# Patient Record
Sex: FEMALE | Race: ASIAN | NOT HISPANIC OR LATINO | URBAN - METROPOLITAN AREA
[De-identification: names, ages, dates, MRNs, and addresses within clinical notes are randomized per-mention and may not be internally consistent; named-entity substitution may affect disease eponyms.]

---

## 2018-06-14 ENCOUNTER — EMERGENCY (EMERGENCY)
Facility: HOSPITAL | Age: 58
LOS: 1 days | Discharge: HOME | End: 2018-06-14
Attending: EMERGENCY MEDICINE | Admitting: EMERGENCY MEDICINE
Payer: COMMERCIAL

## 2018-06-14 VITALS
HEIGHT: 62 IN | RESPIRATION RATE: 16 BRPM | TEMPERATURE: 98 F | DIASTOLIC BLOOD PRESSURE: 99 MMHG | OXYGEN SATURATION: 100 % | SYSTOLIC BLOOD PRESSURE: 157 MMHG | HEART RATE: 93 BPM | WEIGHT: 128.09 LBS

## 2018-06-14 LAB
ALBUMIN SERPL ELPH-MCNC: 4.1 G/DL — SIGNIFICANT CHANGE UP (ref 3.3–5)
ALP SERPL-CCNC: 70 U/L — SIGNIFICANT CHANGE UP (ref 40–120)
ALT FLD-CCNC: 12 U/L — SIGNIFICANT CHANGE UP (ref 4–33)
APPEARANCE UR: CLEAR — SIGNIFICANT CHANGE UP
AST SERPL-CCNC: 12 U/L — SIGNIFICANT CHANGE UP (ref 4–32)
BASOPHILS # BLD AUTO: 0.03 K/UL — SIGNIFICANT CHANGE UP (ref 0–0.2)
BASOPHILS NFR BLD AUTO: 0.3 % — SIGNIFICANT CHANGE UP (ref 0–2)
BILIRUB SERPL-MCNC: 0.2 MG/DL — SIGNIFICANT CHANGE UP (ref 0.2–1.2)
BILIRUB UR-MCNC: NEGATIVE — SIGNIFICANT CHANGE UP
BLOOD UR QL VISUAL: NEGATIVE — SIGNIFICANT CHANGE UP
BUN SERPL-MCNC: 13 MG/DL — SIGNIFICANT CHANGE UP (ref 7–23)
CALCIUM SERPL-MCNC: 9.1 MG/DL — SIGNIFICANT CHANGE UP (ref 8.4–10.5)
CHLORIDE SERPL-SCNC: 101 MMOL/L — SIGNIFICANT CHANGE UP (ref 98–107)
CO2 SERPL-SCNC: 27 MMOL/L — SIGNIFICANT CHANGE UP (ref 22–31)
COLOR SPEC: YELLOW — SIGNIFICANT CHANGE UP
CREAT SERPL-MCNC: 0.8 MG/DL — SIGNIFICANT CHANGE UP (ref 0.5–1.3)
EOSINOPHIL # BLD AUTO: 0.31 K/UL — SIGNIFICANT CHANGE UP (ref 0–0.5)
EOSINOPHIL NFR BLD AUTO: 3.6 % — SIGNIFICANT CHANGE UP (ref 0–6)
GLUCOSE SERPL-MCNC: 131 MG/DL — HIGH (ref 70–99)
GLUCOSE UR-MCNC: NEGATIVE — SIGNIFICANT CHANGE UP
HCT VFR BLD CALC: 39.6 % — SIGNIFICANT CHANGE UP (ref 34.5–45)
HGB BLD-MCNC: 12.7 G/DL — SIGNIFICANT CHANGE UP (ref 11.5–15.5)
IMM GRANULOCYTES # BLD AUTO: 0.02 # — SIGNIFICANT CHANGE UP
IMM GRANULOCYTES NFR BLD AUTO: 0.2 % — SIGNIFICANT CHANGE UP (ref 0–1.5)
KETONES UR-MCNC: NEGATIVE — SIGNIFICANT CHANGE UP
LEUKOCYTE ESTERASE UR-ACNC: HIGH
LIDOCAIN IGE QN: 28.1 U/L — SIGNIFICANT CHANGE UP (ref 7–60)
LYMPHOCYTES # BLD AUTO: 3.12 K/UL — SIGNIFICANT CHANGE UP (ref 1–3.3)
LYMPHOCYTES # BLD AUTO: 36.3 % — SIGNIFICANT CHANGE UP (ref 13–44)
MCHC RBC-ENTMCNC: 27.8 PG — SIGNIFICANT CHANGE UP (ref 27–34)
MCHC RBC-ENTMCNC: 32.1 % — SIGNIFICANT CHANGE UP (ref 32–36)
MCV RBC AUTO: 86.7 FL — SIGNIFICANT CHANGE UP (ref 80–100)
MONOCYTES # BLD AUTO: 0.66 K/UL — SIGNIFICANT CHANGE UP (ref 0–0.9)
MONOCYTES NFR BLD AUTO: 7.7 % — SIGNIFICANT CHANGE UP (ref 2–14)
NEUTROPHILS # BLD AUTO: 4.45 K/UL — SIGNIFICANT CHANGE UP (ref 1.8–7.4)
NEUTROPHILS NFR BLD AUTO: 51.9 % — SIGNIFICANT CHANGE UP (ref 43–77)
NITRITE UR-MCNC: NEGATIVE — SIGNIFICANT CHANGE UP
NRBC # FLD: 0 — SIGNIFICANT CHANGE UP
PH UR: 7.5 — SIGNIFICANT CHANGE UP (ref 4.6–8)
PLATELET # BLD AUTO: 270 K/UL — SIGNIFICANT CHANGE UP (ref 150–400)
PMV BLD: 9.2 FL — SIGNIFICANT CHANGE UP (ref 7–13)
POTASSIUM SERPL-MCNC: 4 MMOL/L — SIGNIFICANT CHANGE UP (ref 3.5–5.3)
POTASSIUM SERPL-SCNC: 4 MMOL/L — SIGNIFICANT CHANGE UP (ref 3.5–5.3)
PROT SERPL-MCNC: 7.4 G/DL — SIGNIFICANT CHANGE UP (ref 6–8.3)
PROT UR-MCNC: NEGATIVE MG/DL — SIGNIFICANT CHANGE UP
RBC # BLD: 4.57 M/UL — SIGNIFICANT CHANGE UP (ref 3.8–5.2)
RBC # FLD: 13.3 % — SIGNIFICANT CHANGE UP (ref 10.3–14.5)
RBC CASTS # UR COMP ASSIST: SIGNIFICANT CHANGE UP (ref 0–?)
SODIUM SERPL-SCNC: 140 MMOL/L — SIGNIFICANT CHANGE UP (ref 135–145)
SP GR SPEC: 1.01 — SIGNIFICANT CHANGE UP (ref 1–1.04)
UROBILINOGEN FLD QL: NORMAL MG/DL — SIGNIFICANT CHANGE UP
WBC # BLD: 8.59 K/UL — SIGNIFICANT CHANGE UP (ref 3.8–10.5)
WBC # FLD AUTO: 8.59 K/UL — SIGNIFICANT CHANGE UP (ref 3.8–10.5)
WBC UR QL: HIGH (ref 0–?)

## 2018-06-14 PROCEDURE — 99283 EMERGENCY DEPT VISIT LOW MDM: CPT

## 2018-06-14 RX ORDER — CIPROFLOXACIN LACTATE 400MG/40ML
500 VIAL (ML) INTRAVENOUS ONCE
Qty: 0 | Refills: 0 | Status: COMPLETED | OUTPATIENT
Start: 2018-06-14 | End: 2018-06-14

## 2018-06-14 RX ORDER — MOXIFLOXACIN HYDROCHLORIDE TABLETS, 400 MG 400 MG/1
1 TABLET, FILM COATED ORAL
Qty: 10 | Refills: 0
Start: 2018-06-14 | End: 2018-06-18

## 2018-06-14 RX ADMIN — Medication 500 MILLIGRAM(S): at 23:44

## 2018-06-14 NOTE — ED PROVIDER NOTE - OBJECTIVE STATEMENT
57y F with hx of HLD and DM here for suprapubic and abd pain beginning today preceding with nausea and nonbloody nonbilious vomiting that occur last night. Denies fever, chills, SOB, CP, diarrhea, constipation, or dysuria s/x

## 2018-06-14 NOTE — ED ADULT TRIAGE NOTE - CHIEF COMPLAINT QUOTE
Pt c/o of diffuse abdomina pain with nausea and vomiting since yesterday pt was seen in  urgent care and sent in for further evaluation.

## 2018-06-16 LAB
BACTERIA UR CULT: SIGNIFICANT CHANGE UP
SPECIMEN SOURCE: SIGNIFICANT CHANGE UP

## 2018-06-17 NOTE — ED POST DISCHARGE NOTE - RESULT SUMMARY
culture grew 3 or more types of organims which indicate collection contamination, consider recollection only if clinically indicated. Patient contact # [phone] discussed with patient needs to follow up with MD for repeat UA/UCX. Discussed with patient need to return to ED if symptoms don't continue to improve or recur or develops any new or worsening symptoms that are of concern.

## 2018-08-01 ENCOUNTER — RESULT REVIEW (OUTPATIENT)
Age: 58
End: 2018-08-01

## 2020-11-20 ENCOUNTER — TRANSCRIPTION ENCOUNTER (OUTPATIENT)
Age: 60
End: 2020-11-20

## 2020-11-20 ENCOUNTER — INPATIENT (INPATIENT)
Facility: HOSPITAL | Age: 60
LOS: 2 days | Discharge: HOME | End: 2020-11-23
Attending: INTERNAL MEDICINE | Admitting: INTERNAL MEDICINE
Payer: COMMERCIAL

## 2020-11-20 VITALS
SYSTOLIC BLOOD PRESSURE: 137 MMHG | RESPIRATION RATE: 18 BRPM | TEMPERATURE: 98 F | HEIGHT: 62 IN | OXYGEN SATURATION: 100 % | DIASTOLIC BLOOD PRESSURE: 97 MMHG | HEART RATE: 112 BPM

## 2020-11-20 LAB
ALBUMIN SERPL ELPH-MCNC: 4.4 G/DL — SIGNIFICANT CHANGE UP (ref 3.3–5)
ALP SERPL-CCNC: 79 U/L — SIGNIFICANT CHANGE UP (ref 40–120)
ALT FLD-CCNC: 18 U/L — SIGNIFICANT CHANGE UP (ref 4–33)
ANION GAP SERPL CALC-SCNC: 14 MMO/L — SIGNIFICANT CHANGE UP (ref 7–14)
AST SERPL-CCNC: 13 U/L — SIGNIFICANT CHANGE UP (ref 4–32)
BASOPHILS # BLD AUTO: 0.01 K/UL — SIGNIFICANT CHANGE UP (ref 0–0.2)
BASOPHILS NFR BLD AUTO: 0.1 % — SIGNIFICANT CHANGE UP (ref 0–2)
BILIRUB SERPL-MCNC: 0.4 MG/DL — SIGNIFICANT CHANGE UP (ref 0.2–1.2)
BUN SERPL-MCNC: 19 MG/DL — SIGNIFICANT CHANGE UP (ref 7–23)
CALCIUM SERPL-MCNC: 10.3 MG/DL — SIGNIFICANT CHANGE UP (ref 8.4–10.5)
CHLORIDE SERPL-SCNC: 92 MMOL/L — LOW (ref 98–107)
CO2 SERPL-SCNC: 31 MMOL/L — SIGNIFICANT CHANGE UP (ref 22–31)
CREAT SERPL-MCNC: 0.81 MG/DL — SIGNIFICANT CHANGE UP (ref 0.5–1.3)
EOSINOPHIL # BLD AUTO: 0.04 K/UL — SIGNIFICANT CHANGE UP (ref 0–0.5)
EOSINOPHIL NFR BLD AUTO: 0.4 % — SIGNIFICANT CHANGE UP (ref 0–6)
GLUCOSE SERPL-MCNC: 233 MG/DL — HIGH (ref 70–99)
HCT VFR BLD CALC: 44.3 % — SIGNIFICANT CHANGE UP (ref 34.5–45)
HGB BLD-MCNC: 13.9 G/DL — SIGNIFICANT CHANGE UP (ref 11.5–15.5)
IMM GRANULOCYTES NFR BLD AUTO: 0.4 % — SIGNIFICANT CHANGE UP (ref 0–1.5)
LIDOCAIN IGE QN: 87.3 U/L — HIGH (ref 7–60)
LYMPHOCYTES # BLD AUTO: 2.59 K/UL — SIGNIFICANT CHANGE UP (ref 1–3.3)
LYMPHOCYTES # BLD AUTO: 24.2 % — SIGNIFICANT CHANGE UP (ref 13–44)
MCHC RBC-ENTMCNC: 26.4 PG — LOW (ref 27–34)
MCHC RBC-ENTMCNC: 31.4 % — LOW (ref 32–36)
MCV RBC AUTO: 84.1 FL — SIGNIFICANT CHANGE UP (ref 80–100)
MONOCYTES # BLD AUTO: 1.06 K/UL — HIGH (ref 0–0.9)
MONOCYTES NFR BLD AUTO: 9.9 % — SIGNIFICANT CHANGE UP (ref 2–14)
NEUTROPHILS # BLD AUTO: 6.97 K/UL — SIGNIFICANT CHANGE UP (ref 1.8–7.4)
NEUTROPHILS NFR BLD AUTO: 65 % — SIGNIFICANT CHANGE UP (ref 43–77)
NRBC # FLD: 0 K/UL — SIGNIFICANT CHANGE UP (ref 0–0)
PLATELET # BLD AUTO: 321 K/UL — SIGNIFICANT CHANGE UP (ref 150–400)
PMV BLD: 9 FL — SIGNIFICANT CHANGE UP (ref 7–13)
POTASSIUM SERPL-MCNC: 3.8 MMOL/L — SIGNIFICANT CHANGE UP (ref 3.5–5.3)
POTASSIUM SERPL-SCNC: 3.8 MMOL/L — SIGNIFICANT CHANGE UP (ref 3.5–5.3)
PROT SERPL-MCNC: 8 G/DL — SIGNIFICANT CHANGE UP (ref 6–8.3)
RBC # BLD: 5.27 M/UL — HIGH (ref 3.8–5.2)
RBC # FLD: 13.8 % — SIGNIFICANT CHANGE UP (ref 10.3–14.5)
SODIUM SERPL-SCNC: 137 MMOL/L — SIGNIFICANT CHANGE UP (ref 135–145)
WBC # BLD: 10.71 K/UL — HIGH (ref 3.8–10.5)
WBC # FLD AUTO: 10.71 K/UL — HIGH (ref 3.8–10.5)

## 2020-11-20 PROCEDURE — 99284 EMERGENCY DEPT VISIT MOD MDM: CPT

## 2020-11-20 RX ORDER — MORPHINE SULFATE 50 MG/1
4 CAPSULE, EXTENDED RELEASE ORAL ONCE
Refills: 0 | Status: DISCONTINUED | OUTPATIENT
Start: 2020-11-20 | End: 2020-11-20

## 2020-11-20 RX ORDER — ONDANSETRON 8 MG/1
4 TABLET, FILM COATED ORAL ONCE
Refills: 0 | Status: COMPLETED | OUTPATIENT
Start: 2020-11-20 | End: 2020-11-20

## 2020-11-20 RX ADMIN — MORPHINE SULFATE 4 MILLIGRAM(S): 50 CAPSULE, EXTENDED RELEASE ORAL at 22:30

## 2020-11-20 RX ADMIN — ONDANSETRON 4 MILLIGRAM(S): 8 TABLET, FILM COATED ORAL at 22:31

## 2020-11-20 RX ADMIN — MORPHINE SULFATE 4 MILLIGRAM(S): 50 CAPSULE, EXTENDED RELEASE ORAL at 22:49

## 2020-11-20 NOTE — ED ADULT NURSE NOTE - OBJECTIVE STATEMENT
A&Ox4 and ambulatory. Pt. states that she began experiencing right sided abdominal pain radiating to right flank last night. Pt. states that she has hx of bladder infection and it feels similar. Right sided abdomen is soft and tender. Pt. states that she was given omeprazole by PCP but it was ineffective. Hypertensive with sbp 180s. Report given to primary RN.

## 2020-11-20 NOTE — ED ADULT TRIAGE NOTE - CHIEF COMPLAINT QUOTE
abd pain    c/o lower pain since last night, vomited yesterday and today, last normal bm today.  states has been drinking but urine output is less.  hx of diabetes, htn, hi chol.

## 2020-11-20 NOTE — ED PROVIDER NOTE - CLINICAL SUMMARY MEDICAL DECISION MAKING FREE TEXT BOX
59 y/o F presenting with abd pain x1 day with associated n/v, RLQ tenderness r/o appy vs other GI pathology. Basic labs, CT, symptom treatment. Dispo pending results - Stevie Coto DO PGY-2

## 2020-11-20 NOTE — ED PROVIDER NOTE - ATTENDING CONTRIBUTION TO CARE
DR. BLOCH, ATTENDING MD-  I performed a face to face bedside interview with patient regarding history of present illness, review of symptoms and past medical history. I completed an independent physical exam.  I have discussed patient's plan of care with the resident.  Patient examined,  mod distress HEENT no jaundice, heart s1s2, mildly tachycardic, lungs clear, abd soft pos RLQ tenderness, pos guarding, no hernia palpated. extrem no edema.

## 2020-11-20 NOTE — ED PROVIDER NOTE - OBJECTIVE STATEMENT
61 y/o F with PMH of HLD, DM presenting with generalized abdominal pain that began last night. States pain has been progressively worsening. Has associated n/v. Decreased PO intake. Denies fevers, chills, cp, sob, diarrhea, LE swelling, dysuria, hematuria.

## 2020-11-20 NOTE — ED ADULT NURSE REASSESSMENT NOTE - NS ED NURSE REASSESS COMMENT FT1
report received from facilitator RN. Patient is A&Ox4. On cardiac monitor. Complaining of abdominal pain rated 10 out of 10. No acute respiratory distress. 20 gauge IV in left antecubital flushes without difficulty, no redness or swelling observed around IV site. Pain medication administered as per order. Dimmed lights for comfort. [place] provided for comfort. Stretcher in lowest position, wheels locked, side rails up as per protocol. Call bell in reach. Will continue to monitor.

## 2020-11-20 NOTE — ED PROVIDER NOTE - PHYSICAL EXAMINATION
gen: well appearing  Mentation: AAO x 3  psych: mood appropriate  ENT: airway patent  Eyes: conjunctivae clear bilaterally  Cardio: RRR, no m/r/g  Resp: normal BS b/l  GI: +TTP in RLQ  : no CVA tenderness  Neuro: sensation and motor function intact  Skin: No evidence of rash  MSK: normal movement of all extremities  Lymph/Vasc: no LE edema

## 2020-11-20 NOTE — ED PROVIDER NOTE - NS ED ROS FT
CONSTITUTIONAL: No fevers, no chills, no lightheadedness, no dizziness  Eyes: no visual changes  Ears: no ear drainage, no ear pain  Nose: no nasal congestion  Mouth/Throat: no sore throat  CV: No chest pain, no palpitations  PULM: No SOB, no cough  GI: +n/v, abd pain; no diarrhea  : no dysuria, no hematuria  SKIN: no rashes.  NEURO: no headache, no focal weakness or numbness  LYMPH/VASC: no LE swelling

## 2020-11-21 DIAGNOSIS — K56.609 UNSPECIFIED INTESTINAL OBSTRUCTION, UNSPECIFIED AS TO PARTIAL VERSUS COMPLETE OBSTRUCTION: ICD-10-CM

## 2020-11-21 PROBLEM — E78.5 HYPERLIPIDEMIA, UNSPECIFIED: Chronic | Status: ACTIVE | Noted: 2018-06-14

## 2020-11-21 PROBLEM — E11.9 TYPE 2 DIABETES MELLITUS WITHOUT COMPLICATIONS: Chronic | Status: ACTIVE | Noted: 2018-06-14

## 2020-11-21 LAB
ANION GAP SERPL CALC-SCNC: 15 MMO/L — HIGH (ref 7–14)
APPEARANCE UR: CLEAR — SIGNIFICANT CHANGE UP
APTT BLD: 27.4 SEC — SIGNIFICANT CHANGE UP (ref 27–36.3)
BACTERIA # UR AUTO: NEGATIVE — SIGNIFICANT CHANGE UP
BASE EXCESS BLDV CALC-SCNC: 6.7 MMOL/L — SIGNIFICANT CHANGE UP
BILIRUB UR-MCNC: NEGATIVE — SIGNIFICANT CHANGE UP
BLD GP AB SCN SERPL QL: NEGATIVE — SIGNIFICANT CHANGE UP
BLOOD GAS VENOUS - CREATININE: 0.85 MG/DL — SIGNIFICANT CHANGE UP (ref 0.5–1.3)
BLOOD UR QL VISUAL: NEGATIVE — SIGNIFICANT CHANGE UP
BUN SERPL-MCNC: 16 MG/DL — SIGNIFICANT CHANGE UP (ref 7–23)
CALCIUM SERPL-MCNC: 9.9 MG/DL — SIGNIFICANT CHANGE UP (ref 8.4–10.5)
CHLORIDE BLDV-SCNC: 98 MMOL/L — SIGNIFICANT CHANGE UP (ref 96–108)
CHLORIDE SERPL-SCNC: 96 MMOL/L — LOW (ref 98–107)
CO2 SERPL-SCNC: 27 MMOL/L — SIGNIFICANT CHANGE UP (ref 22–31)
COLOR SPEC: YELLOW — SIGNIFICANT CHANGE UP
CREAT SERPL-MCNC: 0.76 MG/DL — SIGNIFICANT CHANGE UP (ref 0.5–1.3)
GAS PNL BLDV: 138 MMOL/L — SIGNIFICANT CHANGE UP (ref 136–146)
GLUCOSE BLDC GLUCOMTR-MCNC: 152 MG/DL — HIGH (ref 70–99)
GLUCOSE BLDC GLUCOMTR-MCNC: 207 MG/DL — HIGH (ref 70–99)
GLUCOSE BLDC GLUCOMTR-MCNC: 216 MG/DL — HIGH (ref 70–99)
GLUCOSE BLDV-MCNC: 263 MG/DL — HIGH (ref 70–99)
GLUCOSE SERPL-MCNC: 280 MG/DL — HIGH (ref 70–99)
GLUCOSE UR-MCNC: NEGATIVE — SIGNIFICANT CHANGE UP
HCO3 BLDV-SCNC: 30 MMOL/L — HIGH (ref 20–27)
HCT VFR BLD CALC: 40.8 % — SIGNIFICANT CHANGE UP (ref 34.5–45)
HCT VFR BLDV CALC: 41.1 % — SIGNIFICANT CHANGE UP (ref 34.5–45)
HGB BLD-MCNC: 12.9 G/DL — SIGNIFICANT CHANGE UP (ref 11.5–15.5)
HGB BLDV-MCNC: 13.4 G/DL — SIGNIFICANT CHANGE UP (ref 11.5–15.5)
HYALINE CASTS # UR AUTO: NEGATIVE — SIGNIFICANT CHANGE UP
INR BLD: 1.07 — SIGNIFICANT CHANGE UP (ref 0.88–1.16)
KETONES UR-MCNC: SIGNIFICANT CHANGE UP
LACTATE BLDV-MCNC: 1.6 MMOL/L — SIGNIFICANT CHANGE UP (ref 0.5–2)
LEUKOCYTE ESTERASE UR-ACNC: SIGNIFICANT CHANGE UP
MAGNESIUM SERPL-MCNC: 1.8 MG/DL — SIGNIFICANT CHANGE UP (ref 1.6–2.6)
MCHC RBC-ENTMCNC: 26.6 PG — LOW (ref 27–34)
MCHC RBC-ENTMCNC: 31.6 % — LOW (ref 32–36)
MCV RBC AUTO: 84.1 FL — SIGNIFICANT CHANGE UP (ref 80–100)
NITRITE UR-MCNC: NEGATIVE — SIGNIFICANT CHANGE UP
NRBC # FLD: 0 K/UL — SIGNIFICANT CHANGE UP (ref 0–0)
PCO2 BLDV: 51 MMHG — SIGNIFICANT CHANGE UP (ref 41–51)
PH BLDV: 7.41 PH — SIGNIFICANT CHANGE UP (ref 7.32–7.43)
PH UR: 7 — SIGNIFICANT CHANGE UP (ref 5–8)
PHOSPHATE SERPL-MCNC: 4.4 MG/DL — SIGNIFICANT CHANGE UP (ref 2.5–4.5)
PLATELET # BLD AUTO: 299 K/UL — SIGNIFICANT CHANGE UP (ref 150–400)
PMV BLD: 8.9 FL — SIGNIFICANT CHANGE UP (ref 7–13)
PO2 BLDV: 58 MMHG — HIGH (ref 35–40)
POTASSIUM BLDV-SCNC: 4.1 MMOL/L — SIGNIFICANT CHANGE UP (ref 3.4–4.5)
POTASSIUM SERPL-MCNC: 4.2 MMOL/L — SIGNIFICANT CHANGE UP (ref 3.5–5.3)
POTASSIUM SERPL-SCNC: 4.2 MMOL/L — SIGNIFICANT CHANGE UP (ref 3.5–5.3)
PROT UR-MCNC: 10 — SIGNIFICANT CHANGE UP
PROTHROM AB SERPL-ACNC: 12.2 SEC — SIGNIFICANT CHANGE UP (ref 10.6–13.6)
RBC # BLD: 4.85 M/UL — SIGNIFICANT CHANGE UP (ref 3.8–5.2)
RBC # FLD: 13.8 % — SIGNIFICANT CHANGE UP (ref 10.3–14.5)
RBC CASTS # UR COMP ASSIST: SIGNIFICANT CHANGE UP (ref 0–?)
RH IG SCN BLD-IMP: POSITIVE — SIGNIFICANT CHANGE UP
RH IG SCN BLD-IMP: POSITIVE — SIGNIFICANT CHANGE UP
SAO2 % BLDV: 87.4 % — HIGH (ref 60–85)
SARS-COV-2 RNA SPEC QL NAA+PROBE: SIGNIFICANT CHANGE UP
SODIUM SERPL-SCNC: 138 MMOL/L — SIGNIFICANT CHANGE UP (ref 135–145)
SP GR SPEC: 1.02 — SIGNIFICANT CHANGE UP (ref 1–1.04)
SQUAMOUS # UR AUTO: SIGNIFICANT CHANGE UP
UROBILINOGEN FLD QL: NORMAL — SIGNIFICANT CHANGE UP
WBC # BLD: 10.03 K/UL — SIGNIFICANT CHANGE UP (ref 3.8–10.5)
WBC # FLD AUTO: 10.03 K/UL — SIGNIFICANT CHANGE UP (ref 3.8–10.5)
WBC UR QL: SIGNIFICANT CHANGE UP (ref 0–?)

## 2020-11-21 PROCEDURE — 44180 LAP ENTEROLYSIS: CPT

## 2020-11-21 PROCEDURE — 71045 X-RAY EXAM CHEST 1 VIEW: CPT | Mod: 26

## 2020-11-21 PROCEDURE — 74177 CT ABD & PELVIS W/CONTRAST: CPT | Mod: 26

## 2020-11-21 PROCEDURE — 99222 1ST HOSP IP/OBS MODERATE 55: CPT | Mod: GC

## 2020-11-21 RX ORDER — DEXTROSE 50 % IN WATER 50 %
25 SYRINGE (ML) INTRAVENOUS ONCE
Refills: 0 | Status: DISCONTINUED | OUTPATIENT
Start: 2020-11-21 | End: 2020-11-22

## 2020-11-21 RX ORDER — MORPHINE SULFATE 50 MG/1
4 CAPSULE, EXTENDED RELEASE ORAL ONCE
Refills: 0 | Status: DISCONTINUED | OUTPATIENT
Start: 2020-11-21 | End: 2020-11-21

## 2020-11-21 RX ORDER — HYDROMORPHONE HYDROCHLORIDE 2 MG/ML
0.25 INJECTION INTRAMUSCULAR; INTRAVENOUS; SUBCUTANEOUS
Refills: 0 | Status: DISCONTINUED | OUTPATIENT
Start: 2020-11-21 | End: 2020-11-21

## 2020-11-21 RX ORDER — SODIUM CHLORIDE 9 MG/ML
1000 INJECTION, SOLUTION INTRAVENOUS
Refills: 0 | Status: DISCONTINUED | OUTPATIENT
Start: 2020-11-21 | End: 2020-11-22

## 2020-11-21 RX ORDER — ONDANSETRON 8 MG/1
4 TABLET, FILM COATED ORAL ONCE
Refills: 0 | Status: DISCONTINUED | OUTPATIENT
Start: 2020-11-21 | End: 2020-11-23

## 2020-11-21 RX ORDER — ENOXAPARIN SODIUM 100 MG/ML
40 INJECTION SUBCUTANEOUS DAILY
Refills: 0 | Status: DISCONTINUED | OUTPATIENT
Start: 2020-11-21 | End: 2020-11-23

## 2020-11-21 RX ORDER — ACETAMINOPHEN 500 MG
1000 TABLET ORAL ONCE
Refills: 0 | Status: DISCONTINUED | OUTPATIENT
Start: 2020-11-21 | End: 2020-11-21

## 2020-11-21 RX ORDER — SODIUM CHLORIDE 9 MG/ML
500 INJECTION, SOLUTION INTRAVENOUS ONCE
Refills: 0 | Status: COMPLETED | OUTPATIENT
Start: 2020-11-21 | End: 2020-11-21

## 2020-11-21 RX ORDER — TETRACAINE/BENZOCAINE/BUTAMBEN 2%-14%-2%
1 OINTMENT (GRAM) TOPICAL ONCE
Refills: 0 | Status: COMPLETED | OUTPATIENT
Start: 2020-11-21 | End: 2020-11-21

## 2020-11-21 RX ORDER — GLUCAGON INJECTION, SOLUTION 0.5 MG/.1ML
1 INJECTION, SOLUTION SUBCUTANEOUS ONCE
Refills: 0 | Status: DISCONTINUED | OUTPATIENT
Start: 2020-11-21 | End: 2020-11-22

## 2020-11-21 RX ORDER — HYDROMORPHONE HYDROCHLORIDE 2 MG/ML
0.5 INJECTION INTRAMUSCULAR; INTRAVENOUS; SUBCUTANEOUS
Refills: 0 | Status: DISCONTINUED | OUTPATIENT
Start: 2020-11-21 | End: 2020-11-21

## 2020-11-21 RX ORDER — ONDANSETRON 8 MG/1
4 TABLET, FILM COATED ORAL ONCE
Refills: 0 | Status: COMPLETED | OUTPATIENT
Start: 2020-11-21 | End: 2020-11-21

## 2020-11-21 RX ORDER — ONDANSETRON 8 MG/1
4 TABLET, FILM COATED ORAL ONCE
Refills: 0 | Status: DISCONTINUED | OUTPATIENT
Start: 2020-11-21 | End: 2020-11-21

## 2020-11-21 RX ORDER — INFLUENZA VIRUS VACCINE 15; 15; 15; 15 UG/.5ML; UG/.5ML; UG/.5ML; UG/.5ML
0.5 SUSPENSION INTRAMUSCULAR ONCE
Refills: 0 | Status: DISCONTINUED | OUTPATIENT
Start: 2020-11-21 | End: 2020-11-23

## 2020-11-21 RX ORDER — HYDROMORPHONE HYDROCHLORIDE 2 MG/ML
0.5 INJECTION INTRAMUSCULAR; INTRAVENOUS; SUBCUTANEOUS EVERY 4 HOURS
Refills: 0 | Status: DISCONTINUED | OUTPATIENT
Start: 2020-11-21 | End: 2020-11-22

## 2020-11-21 RX ORDER — DIATRIZOATE MEGLUMINE 180 MG/ML
100 INJECTION, SOLUTION INTRAVESICAL ONCE
Refills: 0 | Status: DISCONTINUED | OUTPATIENT
Start: 2020-11-21 | End: 2020-11-22

## 2020-11-21 RX ORDER — INSULIN LISPRO 100/ML
VIAL (ML) SUBCUTANEOUS EVERY 6 HOURS
Refills: 0 | Status: DISCONTINUED | OUTPATIENT
Start: 2020-11-21 | End: 2020-11-22

## 2020-11-21 RX ORDER — OXYCODONE HYDROCHLORIDE 5 MG/1
5 TABLET ORAL ONCE
Refills: 0 | Status: DISCONTINUED | OUTPATIENT
Start: 2020-11-21 | End: 2020-11-21

## 2020-11-21 RX ORDER — ACETAMINOPHEN 500 MG
1000 TABLET ORAL ONCE
Refills: 0 | Status: COMPLETED | OUTPATIENT
Start: 2020-11-21 | End: 2020-11-21

## 2020-11-21 RX ORDER — DEXTROSE 50 % IN WATER 50 %
15 SYRINGE (ML) INTRAVENOUS ONCE
Refills: 0 | Status: DISCONTINUED | OUTPATIENT
Start: 2020-11-21 | End: 2020-11-22

## 2020-11-21 RX ORDER — DEXTROSE 50 % IN WATER 50 %
12.5 SYRINGE (ML) INTRAVENOUS ONCE
Refills: 0 | Status: DISCONTINUED | OUTPATIENT
Start: 2020-11-21 | End: 2020-11-22

## 2020-11-21 RX ADMIN — SODIUM CHLORIDE 1000 MILLILITER(S): 9 INJECTION, SOLUTION INTRAVENOUS at 10:55

## 2020-11-21 RX ADMIN — Medication 1000 MILLIGRAM(S): at 21:15

## 2020-11-21 RX ADMIN — Medication 2: at 17:23

## 2020-11-21 RX ADMIN — SODIUM CHLORIDE 100 MILLILITER(S): 9 INJECTION, SOLUTION INTRAVENOUS at 20:49

## 2020-11-21 RX ADMIN — Medication 400 MILLIGRAM(S): at 20:45

## 2020-11-21 RX ADMIN — MORPHINE SULFATE 4 MILLIGRAM(S): 50 CAPSULE, EXTENDED RELEASE ORAL at 04:44

## 2020-11-21 RX ADMIN — ONDANSETRON 4 MILLIGRAM(S): 8 TABLET, FILM COATED ORAL at 08:48

## 2020-11-21 RX ADMIN — Medication 4: at 12:08

## 2020-11-21 RX ADMIN — MORPHINE SULFATE 4 MILLIGRAM(S): 50 CAPSULE, EXTENDED RELEASE ORAL at 04:08

## 2020-11-21 RX ADMIN — Medication 4: at 23:49

## 2020-11-21 RX ADMIN — SODIUM CHLORIDE 100 MILLILITER(S): 9 INJECTION, SOLUTION INTRAVENOUS at 06:37

## 2020-11-21 NOTE — PROVIDER CONTACT NOTE (OTHER) - SITUATION
Patient is still hypertensive and tachycardic; 181/109; with HR of 122 denies of any pain. denies chest pain, denies of palpitations.

## 2020-11-21 NOTE — CHART NOTE - NSCHARTNOTEFT_GEN_A_CORE
POST-OPERATIVE NOTE    Patient is s/p ex lap with lysus of adhesions    Subjective:  Patient denies pain at the incisional site, controlled with medication  Denies chest pain, shortness of breath, nausea, vomiting  Is not yet passing gas or having bowel movements  Urinating independently and ambulating independently    Vital Signs Last 24 Hrs  T(C): 36.9 (21 Nov 2020 18:04), Max: 36.9 (21 Nov 2020 13:25)  T(F): 98.4 (21 Nov 2020 18:04), Max: 98.5 (21 Nov 2020 13:25)  HR: 100 (21 Nov 2020 18:04) (94 - 135)  BP: 157/99 (21 Nov 2020 18:04) (136/92 - 190/100)  BP(mean): 100 (21 Nov 2020 17:30) (97 - 111)  RR: 18 (21 Nov 2020 18:04) (12 - 25)  SpO2: 95% (21 Nov 2020 18:04) (90% - 100%)  I&O's Detail    21 Nov 2020 07:01  -  21 Nov 2020 19:57  --------------------------------------------------------  IN:    Lactated Ringers: 150 mL  Total IN: 150 mL    OUT:    Nasogastric/Oral tube (mL): 125 mL    Voided (mL): 300 mL  Total OUT: 425 mL    Total NET: -275 mL        enoxaparin Injectable 40    PAST MEDICAL & SURGICAL HISTORY:  DM (diabetes mellitus)    HLD (hyperlipidemia)    No significant past surgical history          Physical Exam:  General: NAD, resting comfortably in bed  Pulmonary: Nonlabored breathing, no respiratory distress, CTAB  Cardiovascular: NSR, no murmurs or rubs  Abdominal: soft, appropriately tender, nondistended. Port incisions CDI and covered in dermabond.  Extremities: WWP      LABS:                        12.9   10.03 )-----------( 299      ( 21 Nov 2020 09:20 )             40.8     11-21    138  |  96<L>  |  16  ----------------------------<  280<H>  4.2   |  27  |  0.76    Ca    9.9      21 Nov 2020 09:20  Phos  4.4     11-21  Mg     1.8     11-21    TPro  8.0  /  Alb  4.4  /  TBili  0.4  /  DBili  x   /  AST  13  /  ALT  18  /  AlkPhos  79  11-20    PT/INR - ( 21 Nov 2020 04:16 )   PT: 12.2 SEC;   INR: 1.07          PTT - ( 21 Nov 2020 04:16 )  PTT:27.4 SEC  CAPILLARY BLOOD GLUCOSE      POCT Blood Glucose.: 152 mg/dL (21 Nov 2020 17:17)  POCT Blood Glucose.: 216 mg/dL (21 Nov 2020 12:05)  POCT Blood Glucose.: 253 mg/dL (21 Nov 2020 04:42)  POCT Blood Glucose.: 219 mg/dL (20 Nov 2020 20:54)      Radiology and Additional Studies:    Assessment:  The patient is a 60y Female who is now 4 hours post-op from an ex lap with MOISÉS.    Plan:  - Pain control as needed, continue Tylenol, breakthrough dilaudid  - NPO/NGT to LCWS  - ADAT  - DVT ppx  - OOB and ambulating as tolerated  - F/u AM labs    B Team Surgery  [id number] POST-OPERATIVE NOTE    Patient is s/p ex lap with lysis of adhesions    Subjective:  Patient denies pain at the incisional site, controlled with medication  Denies chest pain, shortness of breath, nausea, vomiting  Is not yet passing gas or having bowel movements  Urinating independently and ambulating independently    Vital Signs Last 24 Hrs  T(C): 36.9 (21 Nov 2020 18:04), Max: 36.9 (21 Nov 2020 13:25)  T(F): 98.4 (21 Nov 2020 18:04), Max: 98.5 (21 Nov 2020 13:25)  HR: 100 (21 Nov 2020 18:04) (94 - 135)  BP: 157/99 (21 Nov 2020 18:04) (136/92 - 190/100)  BP(mean): 100 (21 Nov 2020 17:30) (97 - 111)  RR: 18 (21 Nov 2020 18:04) (12 - 25)  SpO2: 95% (21 Nov 2020 18:04) (90% - 100%)  I&O's Detail    21 Nov 2020 07:01  -  21 Nov 2020 19:57  --------------------------------------------------------  IN:    Lactated Ringers: 150 mL  Total IN: 150 mL    OUT:    Nasogastric/Oral tube (mL): 125 mL    Voided (mL): 300 mL  Total OUT: 425 mL    Total NET: -275 mL        enoxaparin Injectable 40    PAST MEDICAL & SURGICAL HISTORY:  DM (diabetes mellitus)    HLD (hyperlipidemia)    No significant past surgical history          Physical Exam:  General: NAD, resting comfortably in bed  Pulmonary: Nonlabored breathing, no respiratory distress, CTAB  Cardiovascular: NSR, no murmurs or rubs  Abdominal: soft, appropriately tender in RLQ, slightly distended. Port incisions CDI and covered in dermabond.  Extremities: WWP      LABS:                        12.9   10.03 )-----------( 299      ( 21 Nov 2020 09:20 )             40.8     11-21    138  |  96<L>  |  16  ----------------------------<  280<H>  4.2   |  27  |  0.76    Ca    9.9      21 Nov 2020 09:20  Phos  4.4     11-21  Mg     1.8     11-21    TPro  8.0  /  Alb  4.4  /  TBili  0.4  /  DBili  x   /  AST  13  /  ALT  18  /  AlkPhos  79  11-20    PT/INR - ( 21 Nov 2020 04:16 )   PT: 12.2 SEC;   INR: 1.07          PTT - ( 21 Nov 2020 04:16 )  PTT:27.4 SEC  CAPILLARY BLOOD GLUCOSE      POCT Blood Glucose.: 152 mg/dL (21 Nov 2020 17:17)  POCT Blood Glucose.: 216 mg/dL (21 Nov 2020 12:05)  POCT Blood Glucose.: 253 mg/dL (21 Nov 2020 04:42)  POCT Blood Glucose.: 219 mg/dL (20 Nov 2020 20:54)      Radiology and Additional Studies:    Assessment:  The patient is a 60y Female who is now 4 hours post-op from an ex lap with MOISÉS.    Plan:  - Pain control as needed, continue Tylenol, breakthrough dilaudid  - NPO/NGT to LCWS  - ADAT  - DVT ppx  - OOB and ambulating as tolerated  - F/u AM labs    B Team Surgery  [id number]

## 2020-11-21 NOTE — ED ADULT NURSE REASSESSMENT NOTE - GENERAL PATIENT STATE
A&Ox4. Respirations even and unlabored. Continues on cardiac monitor,./comfortable appearance/cooperative

## 2020-11-21 NOTE — ED ADULT NURSE REASSESSMENT NOTE - NS ED NURSE REASSESS COMMENT FT1
Patient complaining of abdominal pain. Surgery team paged. awaiting call back. Patient complaining of abdominal pain. Surgery team paged.     Addendum: surgery team at bedside and aware of patients pain.

## 2020-11-21 NOTE — H&P ADULT - HISTORY OF PRESENT ILLNESS
59 yo with PMhx of HLD, DM presenting with abdominal pain that started yesterday. Reports a few episodes of nausea and vomiting at home. Can't recall last time she passed flatus. Last BM this AM. Surgical hx of hysterectomy in early 2000s. Denies fevers, chills, cp, sob, diarrhea, LE swelling, dysuria, hematuria, recent trauma or travel.

## 2020-11-21 NOTE — PROVIDER CONTACT NOTE (OTHER) - ACTION/TREATMENT ORDERED:
As per Bry Huang [id number] and B team; they will hold off on any pain medications and they are okay with patient at HR of 130s. EKG was performed; sinus tachy; will continue to monitor...

## 2020-11-21 NOTE — H&P ADULT - ASSESSMENT
59 y/o with hx of HTN and DM p/w with abdominal pain x1 day found to have SBO     - admit to B Team   - NGT placed and patient immediately pulled it out  - gastrografin challenge- obtain 4hr xray after contrast given and repeat until visualize contrast in colon   - NPO   -IVF       Discussed with attending Dr. Naqvi     [id number]

## 2020-11-21 NOTE — H&P ADULT - ATTENDING COMMENTS
I saw and examined the patient and agree with the above note.    K56.50 Small Bowel Obstruction due to adhesions  -NPO/IVF  -Resuscitation and trend labs and urine output   -Pain control via PRN IV meds  -Small bowel follow through  ---After initial CT scan, oral contrast should be administered through the NGT(then clamped for 2 hours) and an abd xray should be obtained after 4-6 hours to evaluate if contrast passes obstruction and is visualized within the  If no passage into the colon at this time, an abd x ray should be ordered 24 hours from when the contrast was administered.  If oral contrast was given at the initial CT scan, obtain xrays at the same intervals without administering more oral contrast.  -Operative indications include prolonged SBO, increasing pain/generalized peritonitis and/or concerning repeat imaging.    I spent 15min reviewing data, images and documentation as well as in care coordination.  Greater than 50% of my time was spent in discussion regarding pre- and post-operative care as well as risk and benefits of operative intervention.    Marin Naqvi MD   Acute and Critical Care Surgery  (Cell: [phone])  Email: [email or web address]    The Acute Care Surgery (B Team) Attending Group Practice:  Dr. Maciej Vela, Dr. Aroldo Post, Dr. Mariia Baez, Dr. Marin Naqvi    Urgent issues - spectra [id number] or [id number]  Nonurgent issues - [phone]  Patient appointments or after hours - [phone]

## 2020-11-21 NOTE — PROVIDER CONTACT NOTE (OTHER) - SITUATION
Patient is c/o pain/nausea; patient admitted from ED. patient is hypertensive at 179/105; HR of 109; denies of chest pain or resp distress. no

## 2020-11-21 NOTE — H&P ADULT - NSHPLABSRESULTS_GEN_ALL_CORE
Labs:                        13.9   10.71 )-----------( 321      ( 2020 21:55 )             44.3     PT/INR - ( 2020 04:16 )   PT: 12.2 SEC;   INR: 1.07          PTT - ( 2020 04:16 )  PTT:27.4 SEC  11-    137  |  92<L>  |  19  ----------------------------<  233<H>  3.8   |  31  |  0.81    Ca    10.3      2020 21:55    TPro  8.0  /  Alb  4.4  /  TBili  0.4  /  DBili  x   /  AST  13  /  ALT  18  /  AlkPhos  79  11-20    Urinalysis Basic - ( 2020 00:40 )    Color: YELLOW / Appearance: CLEAR / S.023 / pH: 7.0  Gluc: NEGATIVE / Ketone: SMALL  / Bili: NEGATIVE / Urobili: NORMAL   Blood: NEGATIVE / Protein: 10 / Nitrite: NEGATIVE   Leuk Esterase: SMALL / RBC: 0-2 / WBC 3-5   Sq Epi: OCC / Non Sq Epi: x / Bacteria: NEGATIVE            Imaging and other studies:  < from: CT Abdomen and Pelvis w/ IV Cont (20 @ 02:04) >    FINDINGS:  LOWER CHEST: Within normal limits.    LIVER: Indeterminate 1.4 cm hypervascular lesion in the inferior right hepatic lobe. Mild hepatomegaly and hepatic steatosis.  BILE DUCTS: Normal caliber.  GALLBLADDER: Within normal limits.  SPLEEN: Within normal limits.  PANCREAS: Within normal limits.  ADRENALS: Within normal limits.  KIDNEYS/URETERS: Within normal limits.    BLADDER: Within normal limits.  REPRODUCTIVE ORGANS: Status post hysterectomy. No adnexal soft tissue mass.    BOWEL: Small bowel obstruction with transition point in the anterior lower mid abdomen (601, 41). Loops of small bowel beyond this point are collapsed to the terminal ileum. Appendix is not definitely visualized. No evidence of inflammation in the pericecal region. The colon is collapsed. Scattered descending and sigmoid colon diverticula without diverticulitis.  PERITONEUM: Mild interloop mesenteric edema and trace pelvic ascites.  VESSELS: Within normal limits.  RETROPERITONEUM/LYMPH NODES: No lymphadenopathy.  ABDOMINAL WALL: Within normal limits.  BONES: Degenerative changes.    IMPRESSION:  Small bowel obstruction with transition point in the anterior lateral mid abdomen likely due to adhesion. Trace pelvic ascites and mild interloop mesenteric edema.    1.4 cm indeterminate hypervascular liver lesion possibly hemangioma. This could be further evaluated with nonemergent liver MRI as clinically warranted.

## 2020-11-21 NOTE — PROVIDER CONTACT NOTE (OTHER) - SITUATION
Patient is still tachycardic at 133 on continuous pulse ox; pt is also c/o 6/10 pain to her abd region; pt denies of chest pain or SOB or resp distress.

## 2020-11-22 LAB
ANION GAP SERPL CALC-SCNC: 12 MMO/L — SIGNIFICANT CHANGE UP (ref 7–14)
BUN SERPL-MCNC: 12 MG/DL — SIGNIFICANT CHANGE UP (ref 7–23)
CALCIUM SERPL-MCNC: 9.2 MG/DL — SIGNIFICANT CHANGE UP (ref 8.4–10.5)
CHLORIDE SERPL-SCNC: 100 MMOL/L — SIGNIFICANT CHANGE UP (ref 98–107)
CO2 SERPL-SCNC: 28 MMOL/L — SIGNIFICANT CHANGE UP (ref 22–31)
CREAT SERPL-MCNC: 0.76 MG/DL — SIGNIFICANT CHANGE UP (ref 0.5–1.3)
CULTURE RESULTS: SIGNIFICANT CHANGE UP
GLUCOSE BLDC GLUCOMTR-MCNC: 125 MG/DL — HIGH (ref 70–99)
GLUCOSE BLDC GLUCOMTR-MCNC: 147 MG/DL — HIGH (ref 70–99)
GLUCOSE BLDC GLUCOMTR-MCNC: 157 MG/DL — HIGH (ref 70–99)
GLUCOSE BLDC GLUCOMTR-MCNC: 201 MG/DL — HIGH (ref 70–99)
GLUCOSE SERPL-MCNC: 163 MG/DL — HIGH (ref 70–99)
HBA1C BLD-MCNC: 7.7 % — HIGH (ref 4–5.6)
HCT VFR BLD CALC: 36.1 % — SIGNIFICANT CHANGE UP (ref 34.5–45)
HCV AB S/CO SERPL IA: 0.09 S/CO — SIGNIFICANT CHANGE UP (ref 0–0.99)
HCV AB SERPL-IMP: SIGNIFICANT CHANGE UP
HGB BLD-MCNC: 11.6 G/DL — SIGNIFICANT CHANGE UP (ref 11.5–15.5)
MAGNESIUM SERPL-MCNC: 1.8 MG/DL — SIGNIFICANT CHANGE UP (ref 1.6–2.6)
MCHC RBC-ENTMCNC: 27.2 PG — SIGNIFICANT CHANGE UP (ref 27–34)
MCHC RBC-ENTMCNC: 32.1 % — SIGNIFICANT CHANGE UP (ref 32–36)
MCV RBC AUTO: 84.7 FL — SIGNIFICANT CHANGE UP (ref 80–100)
NRBC # FLD: 0 K/UL — SIGNIFICANT CHANGE UP (ref 0–0)
PHOSPHATE SERPL-MCNC: 4.3 MG/DL — SIGNIFICANT CHANGE UP (ref 2.5–4.5)
PLATELET # BLD AUTO: 264 K/UL — SIGNIFICANT CHANGE UP (ref 150–400)
PMV BLD: 8.8 FL — SIGNIFICANT CHANGE UP (ref 7–13)
POTASSIUM SERPL-MCNC: 3.6 MMOL/L — SIGNIFICANT CHANGE UP (ref 3.5–5.3)
POTASSIUM SERPL-SCNC: 3.6 MMOL/L — SIGNIFICANT CHANGE UP (ref 3.5–5.3)
RBC # BLD: 4.26 M/UL — SIGNIFICANT CHANGE UP (ref 3.8–5.2)
RBC # FLD: 13.6 % — SIGNIFICANT CHANGE UP (ref 10.3–14.5)
SODIUM SERPL-SCNC: 140 MMOL/L — SIGNIFICANT CHANGE UP (ref 135–145)
SPECIMEN SOURCE: SIGNIFICANT CHANGE UP
WBC # BLD: 8.62 K/UL — SIGNIFICANT CHANGE UP (ref 3.8–10.5)
WBC # FLD AUTO: 8.62 K/UL — SIGNIFICANT CHANGE UP (ref 3.8–10.5)

## 2020-11-22 RX ORDER — INSULIN LISPRO 100/ML
VIAL (ML) SUBCUTANEOUS AT BEDTIME
Refills: 0 | Status: DISCONTINUED | OUTPATIENT
Start: 2020-11-22 | End: 2020-11-23

## 2020-11-22 RX ORDER — INSULIN LISPRO 100/ML
VIAL (ML) SUBCUTANEOUS
Refills: 0 | Status: DISCONTINUED | OUTPATIENT
Start: 2020-11-22 | End: 2020-11-23

## 2020-11-22 RX ORDER — ACETAMINOPHEN 500 MG
650 TABLET ORAL EVERY 6 HOURS
Refills: 0 | Status: DISCONTINUED | OUTPATIENT
Start: 2020-11-22 | End: 2020-11-23

## 2020-11-22 RX ORDER — ACETAMINOPHEN 500 MG
1000 TABLET ORAL ONCE
Refills: 0 | Status: COMPLETED | OUTPATIENT
Start: 2020-11-22 | End: 2020-11-22

## 2020-11-22 RX ORDER — KETOROLAC TROMETHAMINE 30 MG/ML
15 SYRINGE (ML) INJECTION EVERY 6 HOURS
Refills: 0 | Status: DISCONTINUED | OUTPATIENT
Start: 2020-11-22 | End: 2020-11-22

## 2020-11-22 RX ORDER — MAGNESIUM SULFATE 500 MG/ML
2 VIAL (ML) INJECTION ONCE
Refills: 0 | Status: COMPLETED | OUTPATIENT
Start: 2020-11-22 | End: 2020-11-22

## 2020-11-22 RX ORDER — IBUPROFEN 200 MG
400 TABLET ORAL EVERY 6 HOURS
Refills: 0 | Status: DISCONTINUED | OUTPATIENT
Start: 2020-11-22 | End: 2020-11-23

## 2020-11-22 RX ORDER — DEXTROSE MONOHYDRATE, SODIUM CHLORIDE, AND POTASSIUM CHLORIDE 50; .745; 4.5 G/1000ML; G/1000ML; G/1000ML
1000 INJECTION, SOLUTION INTRAVENOUS
Refills: 0 | Status: DISCONTINUED | OUTPATIENT
Start: 2020-11-22 | End: 2020-11-23

## 2020-11-22 RX ORDER — POTASSIUM CHLORIDE 20 MEQ
20 PACKET (EA) ORAL
Refills: 0 | Status: COMPLETED | OUTPATIENT
Start: 2020-11-22 | End: 2020-11-22

## 2020-11-22 RX ADMIN — DEXTROSE MONOHYDRATE, SODIUM CHLORIDE, AND POTASSIUM CHLORIDE 75 MILLILITER(S): 50; .745; 4.5 INJECTION, SOLUTION INTRAVENOUS at 09:27

## 2020-11-22 RX ADMIN — Medication 400 MILLIGRAM(S): at 18:12

## 2020-11-22 RX ADMIN — Medication 20 MILLIEQUIVALENT(S): at 17:11

## 2020-11-22 RX ADMIN — ENOXAPARIN SODIUM 40 MILLIGRAM(S): 100 INJECTION SUBCUTANEOUS at 09:35

## 2020-11-22 RX ADMIN — Medication 50 GRAM(S): at 12:14

## 2020-11-22 RX ADMIN — Medication 20 MILLIEQUIVALENT(S): at 12:14

## 2020-11-22 RX ADMIN — Medication 2: at 06:11

## 2020-11-22 RX ADMIN — Medication 1000 MILLIGRAM(S): at 09:57

## 2020-11-22 RX ADMIN — Medication 400 MILLIGRAM(S): at 09:27

## 2020-11-22 RX ADMIN — Medication 650 MILLIGRAM(S): at 23:34

## 2020-11-22 RX ADMIN — Medication 2: at 18:11

## 2020-11-22 RX ADMIN — Medication 1000 MILLIGRAM(S): at 18:42

## 2020-11-22 NOTE — PROGRESS NOTE ADULT - SUBJECTIVE AND OBJECTIVE BOX
General Surgery Progress Note    S: Patient seen and examined at bedside. No events overnight. Pain well controlled. Denies chest pain, shortness of breath, nausea/vomiting. Reports feeling better since surgery. Has been passing gas, but no bowel movements yet    Physical Exam:  General: No acute distress  Respiratory: Nonlabored, lungs clear to auscultation  Cardiovascular: RRR, no murmurs  Abdominal: Soft, nondistended, nontender. No rebound or guarding. Surgical incisions c/d/i. NGT in place.   Extremities: WWP, moving all extremities spontaneously      PAST MEDICAL HISTORY:  DM (diabetes mellitus)    HLD (hyperlipidemia)          MEDICATIONS:  dextrose 5% + sodium chloride 0.45% with potassium chloride 20 mEq/L 1000 milliLiter(s) IV Continuous <Continuous>  enoxaparin Injectable 40 milliGRAM(s) SubCutaneous daily  influenza   Vaccine 0.5 milliLiter(s) IntraMuscular once  insulin lispro (ADMELOG) corrective regimen sliding scale   SubCutaneous three times a day before meals  insulin lispro (ADMELOG) corrective regimen sliding scale   SubCutaneous at bedtime  lactated ringers. 1000 milliLiter(s) IV Continuous <Continuous>  ondansetron Injectable 4 milliGRAM(s) IV Push once PRN      ALLERGIES:  penicillins (Swelling)      VITALS & I/Os:  Vital Signs Last 24 Hrs  T(C): 36.5 (2020 06:14), Max: 37.1 (2020 21:42)  T(F): 97.7 (2020 06:14), Max: 98.8 (2020 21:42)  HR: 94 (2020 06:14) (94 - 135)  BP: 144/86 (2020 06:14) (129/78 - 181/109)  BP(mean): 100 (2020 17:30) (97 - 111)  RR: 17 (2020 06:14) (12 - 25)  SpO2: 97% (2020 06:14) (90% - 100%)    I&O's Summary    2020 07:01  -  2020 07:00  --------------------------------------------------------  IN: 1350 mL / OUT: 1545 mL / NET: -195 mL          LABS:                        11.6   8.62  )-----------( 264      ( 2020 06:04 )             36.1         140  |  100  |  12  ----------------------------<  163<H>  3.6   |  28  |  0.76    Ca    9.2      2020 06:04  Phos  4.3       Mg     1.8         TPro  8.0  /  Alb  4.4  /  TBili  0.4  /  DBili  x   /  AST  13  /  ALT  18  /  AlkPhos  79      Lactate:    PT/INR - ( 2020 04:16 )   PT: 12.2 SEC;   INR: 1.07          PTT - ( 2020 04:16 )  PTT:27.4 SEC          Urinalysis Basic - ( 2020 00:40 )    Color: YELLOW / Appearance: CLEAR / S.023 / pH: 7.0  Gluc: NEGATIVE / Ketone: SMALL  / Bili: NEGATIVE / Urobili: NORMAL   Blood: NEGATIVE / Protein: 10 / Nitrite: NEGATIVE   Leuk Esterase: SMALL / RBC: 0-2 / WBC 3-5   Sq Epi: OCC / Non Sq Epi: x / Bacteria: NEGATIVE

## 2020-11-22 NOTE — PROGRESS NOTE ADULT - ASSESSMENT
61 y/o with hx of HTN and DM p/w with abdominal pain x1 day found to have SBO, now POD#1 from laparoscopic MOISÉS    - D/C NGT  - IVF  - Clear liquid diet  - Await return of bowel function

## 2020-11-23 ENCOUNTER — TRANSCRIPTION ENCOUNTER (OUTPATIENT)
Age: 60
End: 2020-11-23

## 2020-11-23 VITALS
HEART RATE: 95 BPM | TEMPERATURE: 98 F | OXYGEN SATURATION: 98 % | SYSTOLIC BLOOD PRESSURE: 144 MMHG | DIASTOLIC BLOOD PRESSURE: 89 MMHG | RESPIRATION RATE: 18 BRPM

## 2020-11-23 LAB
ANION GAP SERPL CALC-SCNC: 10 MMO/L — SIGNIFICANT CHANGE UP (ref 7–14)
BUN SERPL-MCNC: 9 MG/DL — SIGNIFICANT CHANGE UP (ref 7–23)
CALCIUM SERPL-MCNC: 8.4 MG/DL — SIGNIFICANT CHANGE UP (ref 8.4–10.5)
CHLORIDE SERPL-SCNC: 103 MMOL/L — SIGNIFICANT CHANGE UP (ref 98–107)
CO2 SERPL-SCNC: 24 MMOL/L — SIGNIFICANT CHANGE UP (ref 22–31)
CREAT SERPL-MCNC: 0.7 MG/DL — SIGNIFICANT CHANGE UP (ref 0.5–1.3)
GLUCOSE BLDC GLUCOMTR-MCNC: 145 MG/DL — HIGH (ref 70–99)
GLUCOSE BLDC GLUCOMTR-MCNC: 158 MG/DL — HIGH (ref 70–99)
GLUCOSE BLDC GLUCOMTR-MCNC: 188 MG/DL — HIGH (ref 70–99)
GLUCOSE SERPL-MCNC: 152 MG/DL — HIGH (ref 70–99)
HBA1C BLD-MCNC: 7.7 % — HIGH (ref 4–5.6)
HCT VFR BLD CALC: 35.2 % — SIGNIFICANT CHANGE UP (ref 34.5–45)
HGB BLD-MCNC: 11 G/DL — LOW (ref 11.5–15.5)
MAGNESIUM SERPL-MCNC: 2 MG/DL — SIGNIFICANT CHANGE UP (ref 1.6–2.6)
MCHC RBC-ENTMCNC: 26.8 PG — LOW (ref 27–34)
MCHC RBC-ENTMCNC: 31.3 % — LOW (ref 32–36)
MCV RBC AUTO: 85.9 FL — SIGNIFICANT CHANGE UP (ref 80–100)
NRBC # FLD: 0 K/UL — SIGNIFICANT CHANGE UP (ref 0–0)
PHOSPHATE SERPL-MCNC: 2.2 MG/DL — LOW (ref 2.5–4.5)
PLATELET # BLD AUTO: 253 K/UL — SIGNIFICANT CHANGE UP (ref 150–400)
PMV BLD: 8.9 FL — SIGNIFICANT CHANGE UP (ref 7–13)
POTASSIUM SERPL-MCNC: 4 MMOL/L — SIGNIFICANT CHANGE UP (ref 3.5–5.3)
POTASSIUM SERPL-SCNC: 4 MMOL/L — SIGNIFICANT CHANGE UP (ref 3.5–5.3)
RBC # BLD: 4.1 M/UL — SIGNIFICANT CHANGE UP (ref 3.8–5.2)
RBC # FLD: 13.5 % — SIGNIFICANT CHANGE UP (ref 10.3–14.5)
SODIUM SERPL-SCNC: 137 MMOL/L — SIGNIFICANT CHANGE UP (ref 135–145)
WBC # BLD: 7.75 K/UL — SIGNIFICANT CHANGE UP (ref 3.8–10.5)
WBC # FLD AUTO: 7.75 K/UL — SIGNIFICANT CHANGE UP (ref 3.8–10.5)

## 2020-11-23 RX ORDER — IBUPROFEN 200 MG
1 TABLET ORAL
Qty: 0 | Refills: 0 | DISCHARGE
Start: 2020-11-23

## 2020-11-23 RX ORDER — ACETAMINOPHEN 500 MG
2 TABLET ORAL
Qty: 0 | Refills: 0 | DISCHARGE
Start: 2020-11-23

## 2020-11-23 RX ADMIN — Medication 650 MILLIGRAM(S): at 14:00

## 2020-11-23 RX ADMIN — Medication 1: at 18:01

## 2020-11-23 RX ADMIN — Medication 650 MILLIGRAM(S): at 14:30

## 2020-11-23 RX ADMIN — Medication 650 MILLIGRAM(S): at 06:21

## 2020-11-23 RX ADMIN — Medication 650 MILLIGRAM(S): at 00:04

## 2020-11-23 RX ADMIN — Medication 1: at 12:25

## 2020-11-23 RX ADMIN — Medication 650 MILLIGRAM(S): at 06:51

## 2020-11-23 RX ADMIN — Medication 63.75 MILLIMOLE(S): at 12:27

## 2020-11-23 NOTE — PROGRESS NOTE ADULT - SUBJECTIVE AND OBJECTIVE BOX
Surgery    SUBJECTIVE: Pt seen and examined on rounds with team. No acute events overnight.        OBJECTIVE    Physical Exam:  General: No acute distress  Abdominal: Soft, nondistended, nontender. No rebound or guarding. Surgical incisions c/d/i. NGT in place.   Extremities: WWP, moving all extremities spontaneously      VITALS  T(C): 36.7 (11-23-20 @ 06:12), Max: 36.7 (11-22-20 @ 10:00)  HR: 90 (11-23-20 @ 06:12) (88 - 93)  BP: 132/82 (11-23-20 @ 06:12) (117/68 - 155/83)  RR: 18 (11-23-20 @ 06:12) (16 - 18)  SpO2: 100% (11-23-20 @ 06:12) (100% - 100%)  CAPILLARY BLOOD GLUCOSE      POCT Blood Glucose.: 145 mg/dL (23 Nov 2020 08:29)  POCT Blood Glucose.: 125 mg/dL (22 Nov 2020 21:42)  POCT Blood Glucose.: 201 mg/dL (22 Nov 2020 17:11)  POCT Blood Glucose.: 147 mg/dL (22 Nov 2020 12:03)      Is/Os    11-22 @ 07:01  -  11-23 @ 07:00  --------------------------------------------------------  IN:    dextrose 5% + sodium chloride 0.45% w/ Additives: 825 mL    Lactated Ringers: 100 mL    Oral Fluid: 960 mL  Total IN: 1885 mL    OUT:    IV PiggyBack: 0 mL    Voided (mL): 2800 mL  Total OUT: 2800 mL    Total NET: -915 mL          MEDICATIONS (STANDING): dextrose 5% + sodium chloride 0.45% with potassium chloride 20 mEq/L 1000 milliLiter(s) IV Continuous <Continuous>  enoxaparin Injectable 40 milliGRAM(s) SubCutaneous daily  influenza   Vaccine 0.5 milliLiter(s) IntraMuscular once  insulin lispro (ADMELOG) corrective regimen sliding scale   SubCutaneous three times a day before meals  insulin lispro (ADMELOG) corrective regimen sliding scale   SubCutaneous at bedtime    MEDICATIONS (PRN):acetaminophen   Tablet .. 650 milliGRAM(s) Oral every 6 hours PRN Mild Pain (1 - 3)  ibuprofen  Tablet. 400 milliGRAM(s) Oral every 6 hours PRN Moderate Pain (4 - 6)  ondansetron Injectable 4 milliGRAM(s) IV Push once PRN Nausea and/or Vomiting      LABS  CBC (11-23 @ 04:35)                              11.0<L>                         7.75    )----------------(  253        --    % Neutrophils, --    % Lymphocytes, ANC: --                                  35.2    CBC (11-22 @ 06:04)                              11.6                           8.62    )----------------(  264        --    % Neutrophils, --    % Lymphocytes, ANC: --                                  36.1      BMP (11-23 @ 04:35)             137     |  103     |  9     		Ca++ --      Ca 8.4                ---------------------------------( 152<H>		Mg 2.0                4.0     |  24      |  0.70  			Ph 2.2<L>  BMP (11-22 @ 06:04)             140     |  100     |  12    		Ca++ --      Ca 9.2                ---------------------------------( 163<H>		Mg 1.8                3.6     |  28      |  0.76  			Ph 4.3                   IMAGING STUDIES

## 2020-11-23 NOTE — DISCHARGE NOTE NURSING/CASE MANAGEMENT/SOCIAL WORK - NSDCPNINST_GEN_ALL_CORE
Watch for signs of infection; redness, swelling, fever, chills or heat, report such symptoms to the MD. No driving while taking pain medication, it causes drowsiness & constipation. Shower as directed by MD, do not scrub site, allow water to run over incision & pat dry. Do not apply any lotions, ointments or powders to incision. Drink 6-8 glasses of fluids daily to promote hydration. No heavy lifting, pulling or pushing heavy objects. Follow up with primary & surgical MD.

## 2020-11-23 NOTE — DISCHARGE NOTE PROVIDER - NSDCMRMEDTOKEN_GEN_ALL_CORE_FT
acetaminophen 325 mg oral tablet: 2 tab(s) orally every 6 hours, As needed, Mild Pain (1 - 3)  Altace 5 mg oral tablet:   glyburide-metformin 2.5 mg-500 mg oral tablet: 1 tab(s) orally 2 times a day  ibuprofen 400 mg oral tablet: 1 tab(s) orally every 6 hours, As needed, Moderate Pain (4 - 6)  simvastatin 40 mg oral tablet: 1 tab(s) orally once a day (at bedtime)

## 2020-11-23 NOTE — DISCHARGE NOTE PROVIDER - CARE PROVIDER_API CALL
Aroldo Post  General Surgery  [address] [place] [address], [address] [place] [address]  Phone: [phone]  Fax: [phone]  Follow Up Time:

## 2020-11-23 NOTE — PROGRESS NOTE ADULT - ASSESSMENT
61 y/o with hx of HTN and DM p/w with abdominal pain x1 day found to have SBO, now POD#2 from laparoscopic MOISÉS    - Regular diet  - d/c if tolerating today

## 2020-11-23 NOTE — DISCHARGE NOTE PROVIDER - HOSPITAL COURSE
59 yo with PMhx of HLD, DM presenting with abdominal pain that started yesterday. Reports a few episodes of nausea and vomiting at home. Can't recall last time she passed flatus. Last BM this AM. Surgical hx of hysterectomy in early 2000s. Denies fevers, chills, cp, sob, diarrhea, LE swelling, dysuria, hematuria, recent trauma or travel.   Small bowel obstruction with transition point in the anterior lateral mid abdomen likely due to adhesion. Trace pelvic ascites and mild interloop mesenteric edema.    1.4 cm indeterminate hypervascular liver lesion possibly hemangioma. This could be further evaluated with nonemergent liver MRI as clinically warranted    Pt admitted to surgical service and NGT was placed but pt immediately self dc'd.    Pt taken to the OR for dx lap, laparoscopic MOISÉS. Pt tolerated procedure well    Post op pt's diet slowly advanced as tolerated    Per Attending pt now stable for discharge home. Pt tolerating diet, +bowel function, and pain well controlled. Pt to follow up with Dr Post as an outpatient, instructed to call to schedule appointment

## 2020-11-23 NOTE — DISCHARGE NOTE PROVIDER - NSDCCPCAREPLAN_GEN_ALL_CORE_FT
PRINCIPAL DISCHARGE DIAGNOSIS  Diagnosis: Small bowel obstruction  Assessment and Plan of Treatment: WOUND CARE:  Please keep incisions clean and dry. Please do not Scrub or rub incisions. Do not use lotion or powder on incisions.   BATHING: You may shower and/or sponge bathe. You may use warm soapy water in the shower and rinse, pat dry.  ACTIVITY: No heavy lifting or straining. Otherwise, you may return to your usual level of physical activity. If you are taking narcotic pain medication DO NOT drive a car, operate machinery or make important decisions.  DIET: low fiber  NOTIFY YOUR SURGEON IF YOU HAVE: any bleeding that does not stop, any pus draining from your wound(s), any fever (over 100.4 F) persistent nausea/vomiting, stop passing agas/having bowel movements, or if your pain is not controlled on your discharge pain medications.  Please follow up with your primary care physician in one week regarding your hospitalization, bring copies of your discharge paperwork.  Please follow up with your surgeon, Dr. Post as an outpatient, please call to schedule appointment

## 2020-11-23 NOTE — DISCHARGE NOTE NURSING/CASE MANAGEMENT/SOCIAL WORK - NSDCPNDISPN_GEN_ALL_CORE
Education provided on the pain management plan of care/Activities of daily living, including home environment that might     exacerbate pain or reduce effectiveness of the pain management plan of care as well as strategies to address these issues/Opioids not applicable/not prescribed/Side effects of pain management treatment

## 2020-12-02 ENCOUNTER — APPOINTMENT (OUTPATIENT)
Dept: TRAUMA SURGERY | Facility: CLINIC | Age: 60
End: 2020-12-02
Payer: COMMERCIAL

## 2020-12-02 VITALS
SYSTOLIC BLOOD PRESSURE: 145 MMHG | HEIGHT: 62 IN | WEIGHT: 128 LBS | DIASTOLIC BLOOD PRESSURE: 90 MMHG | HEART RATE: 121 BPM | BODY MASS INDEX: 23.55 KG/M2 | TEMPERATURE: 98.2 F

## 2020-12-02 PROCEDURE — 99024 POSTOP FOLLOW-UP VISIT: CPT

## 2020-12-02 NOTE — ASSESSMENT
[FreeTextEntry1] : s/p Lap MOISÉS for small bowel obstruction\par \par Notes pain in the upper abdomen\par Tolerating diet no nausea no vomiting\par With flatus/BM\par Denies fever, chills\par \par Alert not in distress\par Incision clean dry and intact\par \par Recommend warm compress x 15 min TID\par Diet as tolerated\par Follow up as needed\par GI for colonoscopy as schedule in 2 years

## 2021-01-19 ENCOUNTER — APPOINTMENT (OUTPATIENT)
Dept: TRAUMA SURGERY | Facility: CLINIC | Age: 61
End: 2021-01-19

## 2021-01-19 VITALS
DIASTOLIC BLOOD PRESSURE: 94 MMHG | TEMPERATURE: 98.1 F | HEIGHT: 62 IN | WEIGHT: 125 LBS | SYSTOLIC BLOOD PRESSURE: 149 MMHG | HEART RATE: 96 BPM | BODY MASS INDEX: 23 KG/M2

## 2021-01-19 DIAGNOSIS — Z87.19 PERSONAL HISTORY OF OTHER DISEASES OF THE DIGESTIVE SYSTEM: ICD-10-CM

## 2021-03-30 ENCOUNTER — APPOINTMENT (OUTPATIENT)
Dept: TRAUMA SURGERY | Facility: CLINIC | Age: 61
End: 2021-03-30

## 2021-05-07 ENCOUNTER — APPOINTMENT (OUTPATIENT)
Dept: TRAUMA SURGERY | Facility: HOSPITAL | Age: 61
End: 2021-05-07
Payer: COMMERCIAL

## 2021-05-07 VITALS
DIASTOLIC BLOOD PRESSURE: 84 MMHG | HEART RATE: 102 BPM | WEIGHT: 126 LBS | BODY MASS INDEX: 23.19 KG/M2 | HEIGHT: 62 IN | TEMPERATURE: 97.2 F | SYSTOLIC BLOOD PRESSURE: 144 MMHG

## 2021-05-07 DIAGNOSIS — T14.8XXA OTHER INJURY OF UNSPECIFIED BODY REGION, INITIAL ENCOUNTER: ICD-10-CM

## 2021-05-07 PROCEDURE — 99211 OFF/OP EST MAY X REQ PHY/QHP: CPT

## 2021-08-06 PROBLEM — T14.8XXA WOUND DRAINAGE: Status: ACTIVE | Noted: 2021-08-06

## 2021-08-06 NOTE — ASSESSMENT
[FreeTextEntry1] : s/p lap MOISÉS nov 2020\par \par Note of drainage from LUQ port site\par Seen by PMD concern for infection sent for follow\par Denies fever, pain, nausea vomiting\par or abdominal pain\par Tolerating diet\par \par AOX3 not in distress\par Incision healed intact, LUQ incision with scab note of dry excoriated skin\par \par Discussed unlikely a  wound complication  this late as we also used absorbable suture\par Instructed to moisturize skin\par Obtain photograph if recurs\par Discuss possible wound revision for possible suture granuloma as pt complained once in the immediate postop period of drainage but incision was clean dry and intact then\par

## 2022-11-08 NOTE — PROGRESS NOTE ADULT - ATTENDING COMMENTS
The patient was seen and examined, chart and notes reviewed.  The current diagnosis, plan of care and alternatives have been discussed with the patient.  All questions have been answered and updates have been discussed.  The case was discussed with B team residents/PA's and medical students at morning B surgical rounds and throughout the course of the day.    SBO s/p lap MOISÉS  a.  With flatus  b.  Diet as tolerated  c.  Decrease IVF  d.  Await GI function  e.  Discharge planning (4) excellent

## 2023-09-18 ENCOUNTER — EMERGENCY (EMERGENCY)
Facility: HOSPITAL | Age: 63
LOS: 1 days | Discharge: HOME | End: 2023-09-18
Admitting: EMERGENCY MEDICINE
Payer: COMMERCIAL

## 2023-09-18 VITALS
RESPIRATION RATE: 18 BRPM | OXYGEN SATURATION: 100 % | HEART RATE: 107 BPM | SYSTOLIC BLOOD PRESSURE: 154 MMHG | TEMPERATURE: 98 F | DIASTOLIC BLOOD PRESSURE: 94 MMHG

## 2023-09-18 VITALS
DIASTOLIC BLOOD PRESSURE: 73 MMHG | RESPIRATION RATE: 16 BRPM | TEMPERATURE: 98 F | SYSTOLIC BLOOD PRESSURE: 129 MMHG | OXYGEN SATURATION: 100 % | HEART RATE: 104 BPM

## 2023-09-18 PROCEDURE — 93010 ELECTROCARDIOGRAM REPORT: CPT

## 2023-09-18 PROCEDURE — 99284 EMERGENCY DEPT VISIT MOD MDM: CPT

## 2023-09-18 PROCEDURE — 73030 X-RAY EXAM OF SHOULDER: CPT | Mod: 26,LT

## 2023-09-18 PROCEDURE — 73020 X-RAY EXAM OF SHOULDER: CPT | Mod: 26,59,LT

## 2023-09-18 RX ORDER — OXYCODONE HYDROCHLORIDE 5 MG/1
1 TABLET ORAL
Qty: 12 | Refills: 0
Start: 2023-09-18 | End: 2023-09-20

## 2023-09-18 RX ORDER — OXYCODONE HYDROCHLORIDE 5 MG/1
5 TABLET ORAL ONCE
Refills: 0 | Status: DISCONTINUED | OUTPATIENT
Start: 2023-09-18 | End: 2023-09-18

## 2023-09-18 RX ORDER — ONDANSETRON 8 MG/1
1 TABLET, FILM COATED ORAL
Qty: 6 | Refills: 0
Start: 2023-09-18 | End: 2023-09-19

## 2023-09-18 RX ORDER — ONDANSETRON 8 MG/1
4 TABLET, FILM COATED ORAL ONCE
Refills: 0 | Status: COMPLETED | OUTPATIENT
Start: 2023-09-18 | End: 2023-09-18

## 2023-09-18 RX ADMIN — ONDANSETRON 4 MILLIGRAM(S): 8 TABLET, FILM COATED ORAL at 01:51

## 2023-09-18 RX ADMIN — Medication 500 MILLIGRAM(S): at 02:18

## 2023-09-18 RX ADMIN — OXYCODONE HYDROCHLORIDE 5 MILLIGRAM(S): 5 TABLET ORAL at 02:18

## 2023-09-18 NOTE — ED PROVIDER NOTE - PHYSICAL EXAMINATION
CONSTITUTIONAL: Well-appearing; well-nourished; in no apparent distress. Non-toxic appearing.   NEURO: Alert & oriented. Gait steady without assistance. Sensory and motor functions are grossly intact.  PSYCH: Mood appropriate. Thought processes intact.   NECK: Supple  CARD: Regular rate and rhythm, no murmurs  RESP: No accessory muscle use; breath sounds clear and equal bilaterally; no wheezes, rhonchi, or rales     ABD: Soft; non-distended; non-tender. No guarding or rebound.   MUSCULOSKELETAL/EXTREMITIES: FROM in all four extremities; no extremity edema.  Shoulder: No obvious deformity, ecchymosis, contusions, or rash. (+) marked point tenderness to the proximal humerus, No tenderness to clavicle, axilla, or scapula. ROM limited in all directions 2/2 pain. Cap refill < 2 second. Radial pulses 2+ b/l.   SKIN: Warm; dry; no apparent lesions or exudate

## 2023-09-18 NOTE — ED PROVIDER NOTE - PROGRESS NOTE DETAILS
MATTEO Ramirez: Pain improved with increase in ROM; pt counseled regarding S/S to return to the ED, she verbalized understanding, all questions answered.

## 2023-09-18 NOTE — ED PROVIDER NOTE - PATIENT PORTAL LINK FT
You can access the FollowMyHealth Patient Portal offered by [facility] by registering at the following website: [email or web address]. By joining [facility]’s FollowMyHealth portal, you will also be able to view your health information using other applications (apps) compatible with our system.

## 2023-09-18 NOTE — ED PROVIDER NOTE - OBJECTIVE STATEMENT
63 y/o female with hx of HLD, DM, and "fast heart beat" presents c/o atraumatic L shoulder pain since yesterday that began while driving. Pt states that she was making a left turn when she felt a "pop" in her shoulder but didn't pay it attention because it was not bothering her much. She notes today she woke with more pain but was still able to travel to the [employer]. She wore a book bag while she was there, it wasn't heavy, but she notes she did a lot of repetitive movements and now has worsening pain and limited ROM. Pt notes pain radiates into her arm, shoulder blade, and neck. Denies CP, SOB, CALVILLO, jaw pain, dizziness/syncope, NVD, or abdominal pain.

## 2023-09-18 NOTE — ED PROVIDER NOTE - CARE PROVIDER_API CALL
Kulwinder Lindo Jean  Orthopaedic Surgery  [address] [place] [address] [address]  [place] [address]  Phone: [phone]  Fax: [phone]  Follow Up Time: Routine

## 2023-09-18 NOTE — ED PROVIDER NOTE - DIFFERENTIAL DIAGNOSIS
Differential Diagnosis adhesive capsulitis vs degenerative change vs fx vs ACS though latter less likely given H&P

## 2023-09-18 NOTE — ED ADULT TRIAGE NOTE - CHIEF COMPLAINT QUOTE
left arm pain    pt states while making a left turn while driving on Saturday, felt 'something" in her left arm.  resolved but on Sunday did a lot of walking and was wearing a light backpack.. arm pain worsened and radiates to the back.  past medical history- htn, diabetes, cardiac

## 2023-09-18 NOTE — ED PROVIDER NOTE - CLINICAL SUMMARY MEDICAL DECISION MAKING FREE TEXT BOX
61 y/o female with hx of HLD, DM, and "fast heart beat" presents c/o atraumatic L shoulder pain since yesterday that began while driving. Pt states that she was making a left turn when she felt a "pop" in her shoulder but didn't pay it attention because it was not bothering her much. She notes today she woke with more pain but was still able to travel to the [employer]. She wore a book bag while she was there, it wasn't heavy, but she notes she did a lot of repetitive movements and now has worsening pain and limited ROM. Pt notes pain radiates into her arm, shoulder blade, and neck. Denies CP, SOB, CALVILLO, jaw pain, dizziness/syncope, NVD, or abdominal pain.   VSS. Exam as noted above. Will obtain EKG (given left-sided pain) though low suspicion for ACS/pericarditis and plain films of left shoulder. Will provide analgesics and reassess.

## 2023-09-18 NOTE — ED PROVIDER NOTE - NSICDXPASTMEDICALHX_GEN_ALL_CORE_FT
PAST MEDICAL HISTORY:  DM (diabetes mellitus)     H/O sinus tachycardia     HLD (hyperlipidemia)

## 2023-09-18 NOTE — ED ADULT NURSE NOTE - OBJECTIVE STATEMENT
patient aaox3. ambulatory. came in with left shoulder/ arm pain x2 days ago after driving. patient reports pain radiates down the arm and to the middle of the back. 10/10 pain. patient reports unable to have full rom. medicated as ordered. plan of care continues.

## 2023-09-18 NOTE — ED ADULT NURSE NOTE - NSFALLUNIVINTERV_ED_ALL_ED
Bed/Stretcher in lowest position, wheels locked, appropriate side rails in place/Call bell, personal items and telephone in reach/Instruct patient to call for assistance before getting out of bed/chair/stretcher/Non-slip footwear applied when patient is off stretcher/[place] to call system/Physically safe environment - no spills, clutter or unnecessary equipment/Purposeful proactive rounding/Room/bathroom lighting operational, light cord in reach

## 2023-09-20 NOTE — H&P ADULT - NSHPPHYSICALEXAM_GEN_ALL_CORE
Physical Exam  T(C): 36.6  HR: 101 (94 - 112)  BP: 158/91 (137/97 - 190/100)  RR: 18 (16 - 18)  SpO2: 99% (99% - 100%)  Tmax: T(C): , Max: 36.8 (11-20-20 @ 21:50)    General: well developed, well nourished, NAD  Neuro: alert and oriented, no focal deficits, moves all extremities spontaneously  HEENT: NCAT, EOMI, anicteric, mucosa moist  Respiratory: airway patent, respirations unlabored  CVS: regular rate and rhythm  Abdomen: soft, mild TTP in RUQ/RLQ, mildly distended  Extremities: no edema, sensation and movement grossly intact  Skin: warm, dry, appropriate color Cheek To Nose Interpolation Flap Division And Inset Text: Division and inset of the cheek to nose interpolation flap was performed to achieve optimal aesthetic result, restore normal anatomic appearance and avoid distortion of normal anatomy, expedite and facilitate wound healing, achieve optimal functional result and because linear closure either not possible or would produce suboptimal result. The patient was prepped and draped in the usual manner. The pedicle was infiltrated with local anesthesia. The pedicle was sectioned with a #15 blade. The pedicle was de-bulked and trimmed to match the shape of the defect. Hemostasis was achieved. The flap donor site and free margin of the flap were secured with deep buried sutures and the wound edges were re-approximated.

## 2023-09-21 ENCOUNTER — APPOINTMENT (OUTPATIENT)
Dept: ORTHOPEDIC SURGERY | Facility: CLINIC | Age: 63
End: 2023-09-21
Payer: COMMERCIAL

## 2023-09-21 VITALS
DIASTOLIC BLOOD PRESSURE: 68 MMHG | HEIGHT: 62 IN | SYSTOLIC BLOOD PRESSURE: 117 MMHG | BODY MASS INDEX: 23.19 KG/M2 | WEIGHT: 126 LBS

## 2023-09-21 DIAGNOSIS — M25.512 PAIN IN LEFT SHOULDER: ICD-10-CM

## 2023-09-21 PROCEDURE — 99203 OFFICE O/P NEW LOW 30 MIN: CPT

## 2023-10-25 NOTE — ED PROVIDER NOTE - CCCP TRG CHIEF CMPLNT
Social Work - Follow-Up  [facility]    Data/Intervention:    Patient Name: Lang Collins Jr. Goes By: Joss    /Age: 1976 (47 year old)    Reason for Follow-Up:  Returning voicemail    Intervention/Education/Resources Provided:  This clinician called Joss and Sheridan ([phone]). Sheridan reports that Joss was denied CADI waiver due to not meeting the age requirement. Joss in touch with Angy,  at the [facility], regarding this. Sheridan reports that he has located a subsidized building that Joss could relocate into but that he is required to receive cash assistance in order to access this building. Per Sheridan, in order to Joss to receive cash assistance from [facility] he requires a Medical Opinion form that indicates that Joss is unable to work. Sheridan reports that he will email this clinician paperwork for Dr. Park to complete 10/31/23, and requested that paperwork is then faxed to [phone].     SARAH made plan to meet with Joss in-person 11/10/23 to complete paperwork for [employer].     Assessment/Plan:  1) Onc SW to coordinate with Dr. Park regarding completion of Medical Opinion paperwork for [facility].   2) Onc SW will meet with Joss to complete [employer] application at next in-person appointment.     Previously provided patient/family with writer's contact information and availability.      Summer Felix, SHAYY, LICSW, OSW-C  Clinical - Adult Oncology  She/Her/Hers  Phone: [phone]  [facility]: M, Thu  *every other Tue, 8am-4:30pm  [facility]: W, F, *every other Tue, 8am-4:30pm         
abdominal pain

## 2023-10-29 ENCOUNTER — INPATIENT (INPATIENT)
Facility: HOSPITAL | Age: 63
LOS: 3 days | Discharge: HOME | End: 2023-11-02
Attending: INTERNAL MEDICINE | Admitting: INTERNAL MEDICINE
Payer: COMMERCIAL

## 2023-10-29 VITALS
RESPIRATION RATE: 18 BRPM | SYSTOLIC BLOOD PRESSURE: 89 MMHG | OXYGEN SATURATION: 98 % | HEART RATE: 100 BPM | TEMPERATURE: 99 F | DIASTOLIC BLOOD PRESSURE: 55 MMHG

## 2023-10-29 DIAGNOSIS — Z29.9 ENCOUNTER FOR PROPHYLACTIC MEASURES, UNSPECIFIED: ICD-10-CM

## 2023-10-29 DIAGNOSIS — E11.9 TYPE 2 DIABETES MELLITUS WITHOUT COMPLICATIONS: ICD-10-CM

## 2023-10-29 DIAGNOSIS — R68.83 CHILLS (WITHOUT FEVER): ICD-10-CM

## 2023-10-29 DIAGNOSIS — I10 ESSENTIAL (PRIMARY) HYPERTENSION: ICD-10-CM

## 2023-10-29 DIAGNOSIS — A41.9 SEPSIS, UNSPECIFIED ORGANISM: ICD-10-CM

## 2023-10-29 DIAGNOSIS — E78.5 HYPERLIPIDEMIA, UNSPECIFIED: ICD-10-CM

## 2023-10-29 LAB
ALBUMIN SERPL ELPH-MCNC: 4.1 G/DL — SIGNIFICANT CHANGE UP (ref 3.3–5)
ALBUMIN SERPL ELPH-MCNC: 4.1 G/DL — SIGNIFICANT CHANGE UP (ref 3.3–5)
ALP SERPL-CCNC: 79 U/L — SIGNIFICANT CHANGE UP (ref 40–120)
ALP SERPL-CCNC: 79 U/L — SIGNIFICANT CHANGE UP (ref 40–120)
ALT FLD-CCNC: 84 U/L — HIGH (ref 4–33)
ALT FLD-CCNC: 84 U/L — HIGH (ref 4–33)
ANION GAP SERPL CALC-SCNC: 12 MMOL/L — SIGNIFICANT CHANGE UP (ref 7–14)
ANION GAP SERPL CALC-SCNC: 12 MMOL/L — SIGNIFICANT CHANGE UP (ref 7–14)
ANION GAP SERPL CALC-SCNC: 16 MMOL/L — HIGH (ref 7–14)
ANION GAP SERPL CALC-SCNC: 16 MMOL/L — HIGH (ref 7–14)
APPEARANCE UR: CLEAR — SIGNIFICANT CHANGE UP
APPEARANCE UR: CLEAR — SIGNIFICANT CHANGE UP
AST SERPL-CCNC: 46 U/L — HIGH (ref 4–32)
AST SERPL-CCNC: 46 U/L — HIGH (ref 4–32)
BASE EXCESS BLDV CALC-SCNC: -3.4 MMOL/L — LOW (ref -2–3)
BASE EXCESS BLDV CALC-SCNC: -3.4 MMOL/L — LOW (ref -2–3)
BILIRUB SERPL-MCNC: 0.3 MG/DL — SIGNIFICANT CHANGE UP (ref 0.2–1.2)
BILIRUB SERPL-MCNC: 0.3 MG/DL — SIGNIFICANT CHANGE UP (ref 0.2–1.2)
BILIRUB UR-MCNC: NEGATIVE — SIGNIFICANT CHANGE UP
BILIRUB UR-MCNC: NEGATIVE — SIGNIFICANT CHANGE UP
BLOOD GAS VENOUS COMPREHENSIVE RESULT: SIGNIFICANT CHANGE UP
BLOOD GAS VENOUS COMPREHENSIVE RESULT: SIGNIFICANT CHANGE UP
BUN SERPL-MCNC: 14 MG/DL — SIGNIFICANT CHANGE UP (ref 7–23)
BUN SERPL-MCNC: 14 MG/DL — SIGNIFICANT CHANGE UP (ref 7–23)
BUN SERPL-MCNC: 17 MG/DL — SIGNIFICANT CHANGE UP (ref 7–23)
BUN SERPL-MCNC: 17 MG/DL — SIGNIFICANT CHANGE UP (ref 7–23)
CALCIUM SERPL-MCNC: 8.3 MG/DL — LOW (ref 8.4–10.5)
CALCIUM SERPL-MCNC: 8.3 MG/DL — LOW (ref 8.4–10.5)
CALCIUM SERPL-MCNC: 8.8 MG/DL — SIGNIFICANT CHANGE UP (ref 8.4–10.5)
CALCIUM SERPL-MCNC: 8.8 MG/DL — SIGNIFICANT CHANGE UP (ref 8.4–10.5)
CHLORIDE BLDV-SCNC: 102 MMOL/L — SIGNIFICANT CHANGE UP (ref 96–108)
CHLORIDE BLDV-SCNC: 102 MMOL/L — SIGNIFICANT CHANGE UP (ref 96–108)
CHLORIDE SERPL-SCNC: 101 MMOL/L — SIGNIFICANT CHANGE UP (ref 98–107)
CHLORIDE SERPL-SCNC: 101 MMOL/L — SIGNIFICANT CHANGE UP (ref 98–107)
CHLORIDE SERPL-SCNC: 95 MMOL/L — LOW (ref 98–107)
CHLORIDE SERPL-SCNC: 95 MMOL/L — LOW (ref 98–107)
CO2 BLDV-SCNC: 24.1 MMOL/L — SIGNIFICANT CHANGE UP (ref 22–26)
CO2 BLDV-SCNC: 24.1 MMOL/L — SIGNIFICANT CHANGE UP (ref 22–26)
CO2 SERPL-SCNC: 21 MMOL/L — LOW (ref 22–31)
COLOR SPEC: YELLOW — SIGNIFICANT CHANGE UP
COLOR SPEC: YELLOW — SIGNIFICANT CHANGE UP
CREAT SERPL-MCNC: 0.63 MG/DL — SIGNIFICANT CHANGE UP (ref 0.5–1.3)
CREAT SERPL-MCNC: 0.63 MG/DL — SIGNIFICANT CHANGE UP (ref 0.5–1.3)
CREAT SERPL-MCNC: 0.8 MG/DL — SIGNIFICANT CHANGE UP (ref 0.5–1.3)
CREAT SERPL-MCNC: 0.8 MG/DL — SIGNIFICANT CHANGE UP (ref 0.5–1.3)
CRP SERPL-MCNC: 139.8 MG/L — HIGH
CRP SERPL-MCNC: 139.8 MG/L — HIGH
DIFF PNL FLD: NEGATIVE — SIGNIFICANT CHANGE UP
DIFF PNL FLD: NEGATIVE — SIGNIFICANT CHANGE UP
EGFR: 100 ML/MIN/1.73M2 — SIGNIFICANT CHANGE UP
EGFR: 100 ML/MIN/1.73M2 — SIGNIFICANT CHANGE UP
EGFR: 83 ML/MIN/1.73M2 — SIGNIFICANT CHANGE UP
EGFR: 83 ML/MIN/1.73M2 — SIGNIFICANT CHANGE UP
ERYTHROCYTE [SEDIMENTATION RATE] IN BLOOD: 85 MM/HR — HIGH (ref 4–25)
ERYTHROCYTE [SEDIMENTATION RATE] IN BLOOD: 85 MM/HR — HIGH (ref 4–25)
FLUAV AG NPH QL: SIGNIFICANT CHANGE UP
FLUAV AG NPH QL: SIGNIFICANT CHANGE UP
FLUBV AG NPH QL: SIGNIFICANT CHANGE UP
FLUBV AG NPH QL: SIGNIFICANT CHANGE UP
GAS PNL BLDV: 135 MMOL/L — LOW (ref 136–145)
GAS PNL BLDV: 135 MMOL/L — LOW (ref 136–145)
GAS PNL BLDV: SIGNIFICANT CHANGE UP
GAS PNL BLDV: SIGNIFICANT CHANGE UP
GLUCOSE BLDC GLUCOMTR-MCNC: 213 MG/DL — HIGH (ref 70–99)
GLUCOSE BLDC GLUCOMTR-MCNC: 213 MG/DL — HIGH (ref 70–99)
GLUCOSE BLDC GLUCOMTR-MCNC: 240 MG/DL — HIGH (ref 70–99)
GLUCOSE BLDC GLUCOMTR-MCNC: 240 MG/DL — HIGH (ref 70–99)
GLUCOSE BLDV-MCNC: 282 MG/DL — HIGH (ref 70–99)
GLUCOSE BLDV-MCNC: 282 MG/DL — HIGH (ref 70–99)
GLUCOSE SERPL-MCNC: 274 MG/DL — HIGH (ref 70–99)
GLUCOSE SERPL-MCNC: 274 MG/DL — HIGH (ref 70–99)
GLUCOSE SERPL-MCNC: 432 MG/DL — HIGH (ref 70–99)
GLUCOSE SERPL-MCNC: 432 MG/DL — HIGH (ref 70–99)
GLUCOSE UR QL: >=1000 MG/DL
GLUCOSE UR QL: >=1000 MG/DL
HCO3 BLDV-SCNC: 23 MMOL/L — SIGNIFICANT CHANGE UP (ref 22–29)
HCO3 BLDV-SCNC: 23 MMOL/L — SIGNIFICANT CHANGE UP (ref 22–29)
HCT VFR BLD CALC: 37.1 % — SIGNIFICANT CHANGE UP (ref 34.5–45)
HCT VFR BLD CALC: 37.1 % — SIGNIFICANT CHANGE UP (ref 34.5–45)
HCT VFR BLDA CALC: 34 % — LOW (ref 34.5–46.5)
HCT VFR BLDA CALC: 34 % — LOW (ref 34.5–46.5)
HGB BLD CALC-MCNC: 11.4 G/DL — LOW (ref 11.7–16.1)
HGB BLD CALC-MCNC: 11.4 G/DL — LOW (ref 11.7–16.1)
HGB BLD-MCNC: 12 G/DL — SIGNIFICANT CHANGE UP (ref 11.5–15.5)
HGB BLD-MCNC: 12 G/DL — SIGNIFICANT CHANGE UP (ref 11.5–15.5)
KETONES UR-MCNC: NEGATIVE MG/DL — SIGNIFICANT CHANGE UP
KETONES UR-MCNC: NEGATIVE MG/DL — SIGNIFICANT CHANGE UP
LACTATE BLDV-MCNC: 2.8 MMOL/L — HIGH (ref 0.5–2)
LACTATE BLDV-MCNC: 2.8 MMOL/L — HIGH (ref 0.5–2)
LACTATE SERPL-SCNC: 2.3 MMOL/L — HIGH (ref 0.5–2)
LACTATE SERPL-SCNC: 2.3 MMOL/L — HIGH (ref 0.5–2)
LEUKOCYTE ESTERASE UR-ACNC: NEGATIVE — SIGNIFICANT CHANGE UP
LEUKOCYTE ESTERASE UR-ACNC: NEGATIVE — SIGNIFICANT CHANGE UP
LYMPHOCYTES # BLD AUTO: 7 % — LOW (ref 13–44)
LYMPHOCYTES # BLD AUTO: 7 % — LOW (ref 13–44)
MAGNESIUM SERPL-MCNC: 2 MG/DL — SIGNIFICANT CHANGE UP (ref 1.6–2.6)
MAGNESIUM SERPL-MCNC: 2 MG/DL — SIGNIFICANT CHANGE UP (ref 1.6–2.6)
MCHC RBC-ENTMCNC: 26.1 PG — LOW (ref 27–34)
MCHC RBC-ENTMCNC: 26.1 PG — LOW (ref 27–34)
MCHC RBC-ENTMCNC: 32.3 GM/DL — SIGNIFICANT CHANGE UP (ref 32–36)
MCHC RBC-ENTMCNC: 32.3 GM/DL — SIGNIFICANT CHANGE UP (ref 32–36)
MCV RBC AUTO: 80.8 FL — SIGNIFICANT CHANGE UP (ref 80–100)
MCV RBC AUTO: 80.8 FL — SIGNIFICANT CHANGE UP (ref 80–100)
MONOCYTES NFR BLD AUTO: 5 % — SIGNIFICANT CHANGE UP (ref 2–14)
MONOCYTES NFR BLD AUTO: 5 % — SIGNIFICANT CHANGE UP (ref 2–14)
NEUTROPHILS NFR BLD AUTO: 88 % — HIGH (ref 43–77)
NEUTROPHILS NFR BLD AUTO: 88 % — HIGH (ref 43–77)
NITRITE UR-MCNC: NEGATIVE — SIGNIFICANT CHANGE UP
NITRITE UR-MCNC: NEGATIVE — SIGNIFICANT CHANGE UP
OB PNL STL: NEGATIVE — SIGNIFICANT CHANGE UP
OB PNL STL: NEGATIVE — SIGNIFICANT CHANGE UP
PCO2 BLDV: 44 MMHG — SIGNIFICANT CHANGE UP (ref 39–52)
PCO2 BLDV: 44 MMHG — SIGNIFICANT CHANGE UP (ref 39–52)
PH BLDV: 7.32 — SIGNIFICANT CHANGE UP (ref 7.32–7.43)
PH BLDV: 7.32 — SIGNIFICANT CHANGE UP (ref 7.32–7.43)
PH UR: 6.5 — SIGNIFICANT CHANGE UP (ref 5–8)
PH UR: 6.5 — SIGNIFICANT CHANGE UP (ref 5–8)
PLATELET # BLD AUTO: 209 K/UL — SIGNIFICANT CHANGE UP (ref 150–400)
PLATELET # BLD AUTO: 209 K/UL — SIGNIFICANT CHANGE UP (ref 150–400)
PO2 BLDV: 22 MMHG — LOW (ref 25–45)
PO2 BLDV: 22 MMHG — LOW (ref 25–45)
POTASSIUM BLDV-SCNC: 4.5 MMOL/L — SIGNIFICANT CHANGE UP (ref 3.5–5.1)
POTASSIUM BLDV-SCNC: 4.5 MMOL/L — SIGNIFICANT CHANGE UP (ref 3.5–5.1)
POTASSIUM SERPL-MCNC: 3.8 MMOL/L — SIGNIFICANT CHANGE UP (ref 3.5–5.3)
POTASSIUM SERPL-MCNC: 3.8 MMOL/L — SIGNIFICANT CHANGE UP (ref 3.5–5.3)
POTASSIUM SERPL-MCNC: 5 MMOL/L — SIGNIFICANT CHANGE UP (ref 3.5–5.3)
POTASSIUM SERPL-MCNC: 5 MMOL/L — SIGNIFICANT CHANGE UP (ref 3.5–5.3)
POTASSIUM SERPL-SCNC: 3.8 MMOL/L — SIGNIFICANT CHANGE UP (ref 3.5–5.3)
POTASSIUM SERPL-SCNC: 3.8 MMOL/L — SIGNIFICANT CHANGE UP (ref 3.5–5.3)
POTASSIUM SERPL-SCNC: 5 MMOL/L — SIGNIFICANT CHANGE UP (ref 3.5–5.3)
POTASSIUM SERPL-SCNC: 5 MMOL/L — SIGNIFICANT CHANGE UP (ref 3.5–5.3)
PROT SERPL-MCNC: 7.9 G/DL — SIGNIFICANT CHANGE UP (ref 6–8.3)
PROT SERPL-MCNC: 7.9 G/DL — SIGNIFICANT CHANGE UP (ref 6–8.3)
PROT UR-MCNC: NEGATIVE MG/DL — SIGNIFICANT CHANGE UP
PROT UR-MCNC: NEGATIVE MG/DL — SIGNIFICANT CHANGE UP
RBC # BLD: 4.59 M/UL — SIGNIFICANT CHANGE UP (ref 3.8–5.2)
RBC # BLD: 4.59 M/UL — SIGNIFICANT CHANGE UP (ref 3.8–5.2)
RBC # FLD: 14.8 % — HIGH (ref 10.3–14.5)
RBC # FLD: 14.8 % — HIGH (ref 10.3–14.5)
RSV RNA NPH QL NAA+NON-PROBE: SIGNIFICANT CHANGE UP
RSV RNA NPH QL NAA+NON-PROBE: SIGNIFICANT CHANGE UP
SAO2 % BLDV: 27.6 % — LOW (ref 67–88)
SAO2 % BLDV: 27.6 % — LOW (ref 67–88)
SARS-COV-2 RNA SPEC QL NAA+PROBE: SIGNIFICANT CHANGE UP
SARS-COV-2 RNA SPEC QL NAA+PROBE: SIGNIFICANT CHANGE UP
SODIUM SERPL-SCNC: 132 MMOL/L — LOW (ref 135–145)
SODIUM SERPL-SCNC: 132 MMOL/L — LOW (ref 135–145)
SODIUM SERPL-SCNC: 134 MMOL/L — LOW (ref 135–145)
SODIUM SERPL-SCNC: 134 MMOL/L — LOW (ref 135–145)
SP GR SPEC: 1.03 — SIGNIFICANT CHANGE UP (ref 1–1.03)
SP GR SPEC: 1.03 — SIGNIFICANT CHANGE UP (ref 1–1.03)
UROBILINOGEN FLD QL: 0.2 MG/DL — SIGNIFICANT CHANGE UP (ref 0.2–1)
UROBILINOGEN FLD QL: 0.2 MG/DL — SIGNIFICANT CHANGE UP (ref 0.2–1)
WBC # BLD: 14.98 K/UL — HIGH (ref 3.8–10.5)
WBC # BLD: 14.98 K/UL — HIGH (ref 3.8–10.5)
WBC # FLD AUTO: 14.98 K/UL — HIGH (ref 3.8–10.5)
WBC # FLD AUTO: 14.98 K/UL — HIGH (ref 3.8–10.5)

## 2023-10-29 PROCEDURE — 99285 EMERGENCY DEPT VISIT HI MDM: CPT

## 2023-10-29 PROCEDURE — 99223 1ST HOSP IP/OBS HIGH 75: CPT

## 2023-10-29 RX ORDER — DEXTROSE 50 % IN WATER 50 %
12.5 SYRINGE (ML) INTRAVENOUS ONCE
Refills: 0 | Status: DISCONTINUED | OUTPATIENT
Start: 2023-10-29 | End: 2023-11-02

## 2023-10-29 RX ORDER — ACETAMINOPHEN 500 MG
1000 TABLET ORAL ONCE
Refills: 0 | Status: COMPLETED | OUTPATIENT
Start: 2023-10-29 | End: 2023-10-29

## 2023-10-29 RX ORDER — ACETAMINOPHEN 500 MG
650 TABLET ORAL EVERY 6 HOURS
Refills: 0 | Status: DISCONTINUED | OUTPATIENT
Start: 2023-10-29 | End: 2023-11-02

## 2023-10-29 RX ORDER — VANCOMYCIN HCL 1 G
1000 VIAL (EA) INTRAVENOUS EVERY 12 HOURS
Refills: 0 | Status: DISCONTINUED | OUTPATIENT
Start: 2023-10-30 | End: 2023-10-30

## 2023-10-29 RX ORDER — SIMVASTATIN 20 MG/1
40 TABLET, FILM COATED ORAL AT BEDTIME
Refills: 0 | Status: DISCONTINUED | OUTPATIENT
Start: 2023-10-29 | End: 2023-10-30

## 2023-10-29 RX ORDER — VANCOMYCIN HCL 1 G
1000 VIAL (EA) INTRAVENOUS ONCE
Refills: 0 | Status: DISCONTINUED | OUTPATIENT
Start: 2023-10-29 | End: 2023-10-29

## 2023-10-29 RX ORDER — CEFEPIME 1 G/1
1000 INJECTION, POWDER, FOR SOLUTION INTRAMUSCULAR; INTRAVENOUS ONCE
Refills: 0 | Status: COMPLETED | OUTPATIENT
Start: 2023-10-29 | End: 2023-10-29

## 2023-10-29 RX ORDER — SODIUM CHLORIDE 9 MG/ML
1000 INJECTION INTRAMUSCULAR; INTRAVENOUS; SUBCUTANEOUS ONCE
Refills: 0 | Status: COMPLETED | OUTPATIENT
Start: 2023-10-29 | End: 2023-10-29

## 2023-10-29 RX ORDER — SODIUM CHLORIDE 9 MG/ML
1000 INJECTION INTRAMUSCULAR; INTRAVENOUS; SUBCUTANEOUS ONCE
Refills: 0 | Status: DISCONTINUED | OUTPATIENT
Start: 2023-10-29 | End: 2023-10-29

## 2023-10-29 RX ORDER — SODIUM CHLORIDE 9 MG/ML
1000 INJECTION, SOLUTION INTRAVENOUS
Refills: 0 | Status: DISCONTINUED | OUTPATIENT
Start: 2023-10-29 | End: 2023-11-02

## 2023-10-29 RX ORDER — DEXTROSE 50 % IN WATER 50 %
25 SYRINGE (ML) INTRAVENOUS ONCE
Refills: 0 | Status: DISCONTINUED | OUTPATIENT
Start: 2023-10-29 | End: 2023-11-02

## 2023-10-29 RX ORDER — INSULIN LISPRO 100/ML
VIAL (ML) SUBCUTANEOUS
Refills: 0 | Status: DISCONTINUED | OUTPATIENT
Start: 2023-10-29 | End: 2023-11-02

## 2023-10-29 RX ORDER — CEFEPIME 1 G/1
1000 INJECTION, POWDER, FOR SOLUTION INTRAMUSCULAR; INTRAVENOUS EVERY 12 HOURS
Refills: 0 | Status: DISCONTINUED | OUTPATIENT
Start: 2023-10-29 | End: 2023-10-29

## 2023-10-29 RX ORDER — DEXTROSE 50 % IN WATER 50 %
15 SYRINGE (ML) INTRAVENOUS ONCE
Refills: 0 | Status: DISCONTINUED | OUTPATIENT
Start: 2023-10-29 | End: 2023-11-02

## 2023-10-29 RX ORDER — CEFEPIME 1 G/1
1000 INJECTION, POWDER, FOR SOLUTION INTRAMUSCULAR; INTRAVENOUS EVERY 8 HOURS
Refills: 0 | Status: DISCONTINUED | OUTPATIENT
Start: 2023-10-29 | End: 2023-10-29

## 2023-10-29 RX ORDER — ENOXAPARIN SODIUM 100 MG/ML
40 INJECTION SUBCUTANEOUS EVERY 24 HOURS
Refills: 0 | Status: DISCONTINUED | OUTPATIENT
Start: 2023-10-29 | End: 2023-11-02

## 2023-10-29 RX ORDER — SODIUM CHLORIDE 9 MG/ML
2000 INJECTION INTRAMUSCULAR; INTRAVENOUS; SUBCUTANEOUS ONCE
Refills: 0 | Status: COMPLETED | OUTPATIENT
Start: 2023-10-29 | End: 2023-10-29

## 2023-10-29 RX ORDER — GLUCAGON INJECTION, SOLUTION 0.5 MG/.1ML
1 INJECTION, SOLUTION SUBCUTANEOUS ONCE
Refills: 0 | Status: DISCONTINUED | OUTPATIENT
Start: 2023-10-29 | End: 2023-11-02

## 2023-10-29 RX ORDER — CEFEPIME 1 G/1
2000 INJECTION, POWDER, FOR SOLUTION INTRAMUSCULAR; INTRAVENOUS EVERY 12 HOURS
Refills: 0 | Status: DISCONTINUED | OUTPATIENT
Start: 2023-10-29 | End: 2023-10-31

## 2023-10-29 RX ORDER — VANCOMYCIN HCL 1 G
1000 VIAL (EA) INTRAVENOUS ONCE
Refills: 0 | Status: COMPLETED | OUTPATIENT
Start: 2023-10-29 | End: 2023-10-29

## 2023-10-29 RX ORDER — INSULIN LISPRO 100/ML
VIAL (ML) SUBCUTANEOUS AT BEDTIME
Refills: 0 | Status: DISCONTINUED | OUTPATIENT
Start: 2023-10-29 | End: 2023-11-02

## 2023-10-29 RX ADMIN — CEFEPIME 100 MILLIGRAM(S): 1 INJECTION, POWDER, FOR SOLUTION INTRAMUSCULAR; INTRAVENOUS at 15:35

## 2023-10-29 RX ADMIN — Medication 250 MILLIGRAM(S): at 15:35

## 2023-10-29 RX ADMIN — CEFEPIME 1000 MILLIGRAM(S): 1 INJECTION, POWDER, FOR SOLUTION INTRAMUSCULAR; INTRAVENOUS at 16:10

## 2023-10-29 RX ADMIN — SODIUM CHLORIDE 1000 MILLILITER(S): 9 INJECTION INTRAMUSCULAR; INTRAVENOUS; SUBCUTANEOUS at 17:40

## 2023-10-29 RX ADMIN — SODIUM CHLORIDE 2000 MILLILITER(S): 9 INJECTION INTRAMUSCULAR; INTRAVENOUS; SUBCUTANEOUS at 14:00

## 2023-10-29 RX ADMIN — SODIUM CHLORIDE 2000 MILLILITER(S): 9 INJECTION INTRAMUSCULAR; INTRAVENOUS; SUBCUTANEOUS at 16:10

## 2023-10-29 RX ADMIN — Medication 650 MILLIGRAM(S): at 20:58

## 2023-10-29 RX ADMIN — Medication 1000 MILLIGRAM(S): at 20:18

## 2023-10-29 RX ADMIN — Medication 650 MILLIGRAM(S): at 19:53

## 2023-10-29 RX ADMIN — Medication 2: at 19:51

## 2023-10-29 RX ADMIN — SIMVASTATIN 40 MILLIGRAM(S): 20 TABLET, FILM COATED ORAL at 21:53

## 2023-10-29 RX ADMIN — ENOXAPARIN SODIUM 40 MILLIGRAM(S): 100 INJECTION SUBCUTANEOUS at 19:41

## 2023-10-29 NOTE — ED ADULT NURSE REASSESSMENT NOTE - NS ED NURSE REASSESS COMMENT FT1
Pt AAOx4 resting in bed in non acute distress. Respirations even and unlabored. Afebrile at this time. Pt denies pain/discomfort. IVF infusing. Pt oob ambulatory to bathroom. Comfort measures provided. VS as noted. Pt admitted to MED awaiting bed assignment.

## 2023-10-29 NOTE — H&P ADULT - NSICDXPASTMEDICALHX_GEN_ALL_CORE_FT
PAST MEDICAL HISTORY:  DM (diabetes mellitus)     H/O sinus tachycardia     HLD (hyperlipidemia)      PAST MEDICAL HISTORY:  DM (diabetes mellitus)     HLD (hyperlipidemia)

## 2023-10-29 NOTE — ED ADULT NURSE NOTE - OBJECTIVE STATEMENT
c/o abd discomfort nausea not feeling well x 1 week pt also w/rash to body denies pain or itchiness w/it, pt AAOx4, breathing room air abd soft ntnd x  4quads moving all extremities no pedal edema, pt on fall precautions, pt denies any cp sob dizziness c/o nausea no active vomiting no diarrhea no sore throat no cough. pt pending md evaluation

## 2023-10-29 NOTE — H&P ADULT - NSHPLABSRESULTS_GEN_ALL_CORE
Blood Gas Profile - Venous (10.29.23 @ 16:10)   pH, Venous: 7.32  pCO2, Venous: 44 mmHg  pO2, Venous: 22 mmHg  HCO3, Venous: 23 mmol/L  Base Excess, Venous: -3.4 mmol/L  Oxygen Saturation, Venous: 27.6 %  Total CO2, Venous: 24.1 mmol/L  Blood Gas Source Venous: Venous    Basic Metabolic Panel - STAT (10.29.23 @ 15:50)   Sodium: 134 mmol/L  Potassium: 5.0: SPECIMEN MODERATELY HEMOLYZED mmol/L  Chloride: 101 mmol/L  Carbon Dioxide: 21 mmol/L  Anion Gap: 12 mmol/L  Blood Urea Nitrogen: 14 mg/dL  Creatinine: 0.63 mg/dL  Glucose: 274 mg/dL  Calcium: 8.3 mg/dL  eGFR: 100    C-Reactive Protein, Serum (10.29.23 @ 15:50)   C-Reactive Protein, Serum: 139.8 mg/L    Sedimentation Rate, Erythrocyte (10.29.23 @ 15:50)     Complete Blood Count + Automated Diff (10.29.23 @ 13:38)   WBC Count: 14.98 K/uL  RBC Count: 4.59 M/uL  Hemoglobin: 12.0 g/dL  Hematocrit: 37.1 %  Mean Cell Volume: 80.8 fL  Mean Cell Hemoglobin: 26.1 pg  Mean Cell Hemoglobin Conc: 32.3 gm/dL  Red Cell Distrib Width: 14.8 %  Platelet Count - Automated: 209 K/uL    Urinalysis (10.29.23 @ 13:30)   pH Urine: 6.5  Glucose Qualitative, Urine: >=1000 mg/dL  Blood, Urine: Negative  Color: Yellow  Urine Appearance: Clear  Bilirubin: Negative  Ketone - Urine: Negative mg/dL  Specific Gravity: 1.030  Protein, Urine: Negative mg/dL  Urobilinogen: 0.2 mg/dL  Nitrite: Negative  Leukocyte Esterase Concentration: Negative  Sedimentation Rate, Erythrocyte: 85 mm/hr 12.0   14.98 )-----------( 209      ( 29 Oct 2023 13:38 )             37.1       10-29    134<L>  |  101  |  14  ----------------------------<  274<H>  5.0   |  21<L>  |  0.63    Ca    8.3<L>      29 Oct 2023 15:50  Mg     2.00     10-29    TPro  7.9  /  Alb  4.1  /  TBili  0.3  /  DBili  x   /  AST  46<H>  /  ALT  84<H>  /  AlkPhos  79  10-29              Urinalysis Basic - ( 29 Oct 2023 15:50 )    Color: x / Appearance: x / SG: x / pH: x  Gluc: 274 mg/dL / Ketone: x  / Bili: x / Urobili: x   Blood: x / Protein: x / Nitrite: x   Leuk Esterase: x / RBC: x / WBC x   Sq Epi: x / Non Sq Epi: x / Bacteria: x                  CAPILLARY BLOOD GLUCOSE      POCT Blood Glucose.: 483 mg/dL (29 Oct 2023 12:51)

## 2023-10-29 NOTE — H&P ADULT - ASSESSMENT
63-year-old female with past medical history of diabetes complaining of a 1 week history of intermittent fevers, chills, nausea, and generalized weakness admitted for sepsis of unclear etiology  63-year-old female with past medical history of diabetes complaining of a 1 week history of intermittent fevers, chills, nausea, and generalized weakness admitted for sepsis of unclear etiology.

## 2023-10-29 NOTE — ED ADULT NURSE NOTE - NSFALLUNIVINTERV_ED_ALL_ED
Bed/Stretcher in lowest position, wheels locked, appropriate side rails in place/Call bell, personal items and telephone in reach/Instruct patient to call for assistance before getting out of bed/chair/stretcher/Non-slip footwear applied when patient is off stretcher/[place] to call system/Physically safe environment - no spills, clutter or unnecessary equipment/Purposeful proactive rounding/Room/bathroom lighting operational, light cord in reach

## 2023-10-29 NOTE — H&P ADULT - NSHPREVIEWOFSYSTEMS_GEN_ALL_CORE
REVIEW OF SYSTEMS:    CONSTITUTIONAL:  + fevers and chills  EYES/ENT:  No visual changes;  No vertigo or throat pain   NECK:  No pain or stiffness  RESPIRATORY:  No cough, wheezing, hemoptysis; No shortness of breath  CARDIOVASCULAR:  No chest pain or palpitations  GASTROINTESTINAL:  No abdominal or epigastric pain. No nausea, vomiting, or hematemesis; No diarrhea or constipation. No melena or hematochezia.  GENITOURINARY:  No dysuria, frequency or hematuria  MUSCULOSKELETAL:  FROM all extremities, normal strength, No calf tenderness  NEUROLOGICAL:  No numbness or weakness  SKIN:  No itching, rashes REVIEW OF SYSTEMS:    CONSTITUTIONAL:  Endorses fevers, chills  EYES/ENT:  No visual changes;  No vertigo or throat pain   NECK:  No pain or stiffness  RESPIRATORY:  No cough, wheezing, hemoptysis; No shortness of breath  CARDIOVASCULAR:  No chest pain or palpitations  GASTROINTESTINAL:  No abdominal or epigastric pain. No nausea, vomiting, or hematemesis; No diarrhea or constipation. No melena or hematochezia.  GENITOURINARY:  No dysuria, frequency or hematuria  MUSCULOSKELETAL:  FROM all extremities, normal strength, No calf tenderness  NEUROLOGICAL:  No numbness or weakness  SKIN:  No itching, rashes REVIEW OF SYSTEMS:    CONSTITUTIONAL:  Endorses fevers, chills  EYES/ENT:  No visual changes;  No vertigo or throat pain   NECK:  No pain or stiffness  RESPIRATORY:  No cough, wheezing, hemoptysis; No shortness of breath  CARDIOVASCULAR:  No chest pain or palpitations  GASTROINTESTINAL:  No abdominal or epigastric pain. No nausea, vomiting, or hematemesis; No diarrhea or constipation. No melena or hematochezia.  GENITOURINARY:  No dysuria, frequency or hematuria  MUSCULOSKELETAL:  FROM all extremities, normal strength, No calf tenderness  NEUROLOGICAL:  No numbness or weakness  SKIN: + rash

## 2023-10-29 NOTE — H&P ADULT - NSICDXFAMILYHX_GEN_ALL_CORE_FT
FAMILY HISTORY:  Father  Still living? Unknown  FH: type 2 diabetes, Age at diagnosis: Age Unknown    Mother  Still living? Unknown  FH: type 2 diabetes, Age at diagnosis: Age Unknown    Sibling  Still living? Unknown  FH: type 2 diabetes, Age at diagnosis: Age Unknown

## 2023-10-29 NOTE — ED ADULT NURSE NOTE - CHPI ED NUR SYMPTOMS POS
Per Dr. Lawrence Session, ok for 30 days of Lamisil and then repeat hepatic panel to continue for next 60 day supply. Patient informed and voiced understanding. Rx sent to pharmacy and lab ordered. WEAKNESS

## 2023-10-29 NOTE — ED PROVIDER NOTE - ATTENDING CONTRIBUTION TO CARE
The patient is a 63y Female who has a past medical and surgery history of  HTN  HLD DM ST PTED c/o of occasional chills, fever on and off and  nausea for few days as well, denies any vomiting, denies dysuria, denies cough   Vital Signs Stable  PE: as described; my additions and exceptions are noted in the chart    DATA:  EKG: pending at time of evaluation  LAB: Pending at time of evaluation    IMPRESSION/RISK:  Dx=  Differential Hyperglycemia with hypovolemia  includes but not limited to conditions listed in order of most possible first:   Consideration include weakness hyperglycemia and hypotension (normally on Altace) with palpable purpura lower extremities with some blistering ? r/o sepsis from occult source  Plan  ivfs labs magnesium phosphorus UA   reassess  Dispo as per results and response

## 2023-10-29 NOTE — ED PROVIDER NOTE - OBJECTIVE STATEMENT
63-year-old female with past medical history of diabetes complaining of a 1 week history of intermittent fevers, chills, nausea, and generalized weakness.  Patient stated that the fever has subsided with Tylenol use. Pt is otherwise asymptomatic. Denies vomiting, dizziness, chest pain, SOB, abdominal pain, dysuria, hematuria.

## 2023-10-29 NOTE — H&P ADULT - HISTORY OF PRESENT ILLNESS
63-year-old female with past medical history of diabetes complaining of a 1 week history of intermittent fevers, chills, nausea, and generalized weakness           In the ED: T = 99.1F; BP = 89/55; HR = 100; RR = 18; O2 = 98% RA  s/p cefepime + vanc; 4L NS;   Labs remarkable for WBC 15; Lactate 2.8 Ms. Pham Reynolds is a 63-year-old female w/ PMH of HLD and T2DM p/w a 1 week history of intermittent fevers, chills, nausea, and generalized weakness. Patient reports fatigue and generalized weakness starting 1 week ago. About 5 days ago, she began to endorse fever and chills. Overnight she began to experience arthralgias and then noticed a diffuse rash across her chest, back and b/l LEs this morning. Also of note, patient endorses one episode of red spotting in BM this morning. Symptoms have also been a/w nausea and vomiting. Denies SOB, diarrhea constipation, and dysuria. No sick contacts. In ED, patient was afebrile, hypotensive to 89/55, and tachycardic to 100. Labs significant for leukocytosis of 14.98 and lactate of 2.8. Patient received 1x dose of cefepime, vancomycin and 3L NS. Patient admitted to medicine for further workup and management.

## 2023-10-29 NOTE — H&P ADULT - ATTENDING COMMENTS
63yr old female with a pmh of HTN, HLD, T2DM not on insulin who presents with a one week history of feelig unwell. She reports for the past 1 week she has had increased fatigue, subjective fevers, chills, nausea with decreased PO intake. This morning when she woke up and took a shower she noticed a new rash that is not painful and not pruritic. She also reports one small bout of NBNB emesis while brushing her teeth today. Denies sick contacts. Works from home.   Denies  headache, dizziness, chest pain, palpitations, SOB, abdominal pain, joint pain, diarrhea/constipation, urinary symptoms.   Vitals: T 99.1, HR 89 (100), BP 89/55-> 129/74, RR 20 satting 96% RA    EKG interpreted by myself: Sinus tachycardia  CXR pending     REVIEW OF SYSTEMS:    CONSTITUTIONAL: + fatigue/weakness, subjective fevers and chills  EYES/ENT: No visual changes;  No dysphagia; No sore throat; No rhinorrhea; No sinus pain/pressure  NECK: No pain or stiffness  RESPIRATORY: No cough, wheezing, hemoptysis; No shortness of breath  CARDIOVASCULAR: No chest pain or palpitations; No lower extremity edema  GASTROINTESTINAL: No abdominal or epigastric pain. No nausea, vomiting, or hematemesis; No diarrhea or constipation. No melena or hematochezia.  GENITOURINARY: No dysuria, frequency or hematuria  NEUROLOGICAL: No numbness or weakness  MSK: ambulates without assistance   SKIN + new full body rash   All other review of systems is negative unless indicated above.    PHYSICAL EXAM:  GENERAL: NAD, comfortable at bedside   HEAD:  Atraumatic, Normocephalic  EYES: EOMI, PERRL, conjunctiva and sclera clear  NECK: Supple, No JVD  CHEST/LUNG: Clear to auscultation bilaterally; No wheezes, rales or rhonchi  HEART: Regular rate and rhythm; No murmurs, rubs, or gallops, (+)S1, S2  ABDOMEN: Soft, Nontender, Nondistended; Normal Bowel sounds   EXTREMITIES:  2+ Peripheral Pulses, No clubbing, cyanosis, or edema  PSYCH: normal mood and affect  NEUROLOGY: AAOx3, moving all extremities spontaneously   SKIN: generalized blanching palpable purpura throughout chest, back, abdomen, lower extremities     Sepsis with unknown source   New  , BP 89/55 on presentation, WBC 14.98, ESR 85,   UA negative, CXR pending, Rapid RVP negative  s/p 3L IVF  Full RVP ordered  s/p vanc and cefepime-> continue for the time being   Bcx pending, MRSA ordered    Rash  New  New onset blanching palpable purpura throughout body  Dermatology consult in AM     HTN   Chronic unstable as hypotensive on presentation  Hold ramipril 5mg daily and restart as appropriate     T2DM   Chronic moderate exacerbation    on presentation   Hold home metformin/glimepiride   LDCS with diabetic diet  A1c and lipid panel in AM     HLD   Chronics table  Continue simvastatin 40mg daily   Lipid panel in AM     Remainder as above 63yr old female with a pmh of HTN, HLD, T2DM not on insulin who presents with a one week history of feelig unwell. She reports for the past 1 week she has had increased fatigue, subjective fevers, chills, nausea with decreased PO intake. This morning when she woke up and took a shower she noticed a new rash that is not painful and not pruritic. She also reports one small bout of NBNB emesis while brushing her teeth today. Denies sick contacts. Works from home.   Denies  headache, dizziness, chest pain, palpitations, SOB, abdominal pain, joint pain, diarrhea/constipation, urinary symptoms.   Vitals: T 99.1, HR 89 (100), BP 89/55-> 129/74, RR 20 satting 96% RA    EKG interpreted by myself: Sinus tachycardia  CXR pending     REVIEW OF SYSTEMS:    CONSTITUTIONAL: + fatigue/weakness, subjective fevers and chills  EYES/ENT: No visual changes;  No dysphagia; No sore throat; No rhinorrhea; No sinus pain/pressure  NECK: No pain or stiffness  RESPIRATORY: No cough, wheezing, hemoptysis; No shortness of breath  CARDIOVASCULAR: No chest pain or palpitations; No lower extremity edema  GASTROINTESTINAL: No abdominal or epigastric pain. No nausea, vomiting, or hematemesis; No diarrhea or constipation. No melena or hematochezia.  GENITOURINARY: No dysuria, frequency or hematuria  NEUROLOGICAL: No numbness or weakness  MSK: ambulates without assistance   SKIN + new full body rash   All other review of systems is negative unless indicated above.    PHYSICAL EXAM:  GENERAL: NAD, comfortable at bedside   HEAD:  Atraumatic, Normocephalic  EYES: EOMI, PERRL, conjunctiva and sclera clear  NECK: Supple, No JVD  CHEST/LUNG: Clear to auscultation bilaterally; No wheezes, rales or rhonchi  HEART: Regular rate and rhythm; No murmurs, rubs, or gallops, (+)S1, S2  ABDOMEN: Soft, Nontender, Nondistended; Normal Bowel sounds   EXTREMITIES:  2+ Peripheral Pulses, No clubbing, cyanosis, or edema  PSYCH: normal mood and affect  NEUROLOGY: AAOx3, moving all extremities spontaneously   SKIN: generalized blanching palpable purpura throughout chest, back, abdomen, lower extremities     Sepsis with unknown source   New  , BP 89/55 on presentation, WBC 14.98, ESR 85,   UA negative, CXR pending, Rapid RVP negative  s/p 3L IVF  Full RVP ordered  s/p vanc and cefepime-> continue for the time being   Bcx pending, MRSA ordered    Rash  New  New onset blanching palpable purpura throughout body  Dermatology consult in AM if still persistent  sepsis as a differential,   Hepatitis panel ordered     HTN   Chronic unstable as hypotensive on presentation  Hold ramipril 5mg daily and restart as appropriate     T2DM   Chronic moderate exacerbation    on presentation   Hold home metformin/glimepiride   LDCS with diabetic diet  A1c and lipid panel in AM     HLD   Chronics table  Continue simvastatin 40mg daily   Lipid panel in AM     Remainder as above 63yr old female with a pmh of HTN, HLD, T2DM not on insulin who presents with a one week history of feelig unwell. She reports for the past 1 week she has had increased fatigue, subjective fevers, chills, nausea with decreased PO intake. This morning when she woke up and took a shower she noticed a new rash that is not painful and not pruritic. She also reports one small bout of NBNB emesis while brushing her teeth today. She also reports an episode of blood with wiping after a bowel movement. Denies sick contacts. Works from home.   Denies  headache, dizziness, chest pain, palpitations, SOB, abdominal pain, joint pain, diarrhea/constipation, urinary symptoms.   Vitals: T 99.1, HR 89 (100), BP 89/55-> 129/74, RR 20 satting 96% RA    EKG interpreted by myself: Sinus tachycardia  CXR pending     REVIEW OF SYSTEMS:    CONSTITUTIONAL: + fatigue/weakness, subjective fevers and chills  EYES/ENT: No visual changes;  No dysphagia; No sore throat; No rhinorrhea; No sinus pain/pressure  NECK: No pain or stiffness  RESPIRATORY: No cough, wheezing, hemoptysis; No shortness of breath  CARDIOVASCULAR: No chest pain or palpitations; No lower extremity edema  GASTROINTESTINAL: No abdominal or epigastric pain. No nausea, vomiting, or hematemesis; No diarrhea or constipation. No melena or hematochezia.  GENITOURINARY: No dysuria, frequency or hematuria  NEUROLOGICAL: No numbness or weakness  MSK: ambulates without assistance   SKIN + new full body rash   All other review of systems is negative unless indicated above.    PHYSICAL EXAM:  GENERAL: NAD, comfortable at bedside   HEAD:  Atraumatic, Normocephalic  EYES: EOMI, PERRL, conjunctiva and sclera clear  NECK: Supple, No JVD  CHEST/LUNG: Clear to auscultation bilaterally; No wheezes, rales or rhonchi  HEART: Regular rate and rhythm; No murmurs, rubs, or gallops, (+)S1, S2  ABDOMEN: Soft, Nontender, Nondistended; Normal Bowel sounds   EXTREMITIES:  2+ Peripheral Pulses, No clubbing, cyanosis, or edema  PSYCH: normal mood and affect  NEUROLOGY: AAOx3, moving all extremities spontaneously   SKIN: generalized blanching palpable purpura throughout chest, back, abdomen, lower extremities     Sepsis with unknown source   New  , BP 89/55 on presentation, WBC 14.98, ESR 85,   UA negative, CXR pending, Rapid RVP negative  s/p 3L IVF  Full RVP ordered  s/p vanc and cefepime-> continue for the time being   Bcx pending, MRSA ordered    Rash  New  New onset blanching palpable purpura throughout body  Dermatology consult in AM if still persistent  sepsis as a differential,   Hepatitis panel ordered     HTN   Chronic unstable as hypotensive on presentation  Hold ramipril 5mg daily and restart as appropriate     T2DM   Chronic moderate exacerbation    on presentation   Hold home metformin/glimepiride   LDCS with diabetic diet  A1c and lipid panel in AM     HLD   Chronics table  Continue simvastatin 40mg daily   Lipid panel in AM     Remainder as above

## 2023-10-29 NOTE — ED ADULT TRIAGE NOTE - ESI TRIAGE ACUITY LEVEL, MLM
[Fully active, able to carry on all pre-disease performance without restriction] : Status 0 - Fully active, able to carry on all pre-disease performance without restriction [Normal] : well developed, well nourished, in no acute distress 2

## 2023-10-29 NOTE — ED ADULT NURSE REASSESSMENT NOTE - NS ED NURSE REASSESS COMMENT FT1
pt w/fever tachycardic provider Chris notified via teams given tylenol pt receiving IVF tolerating well pt pending bed, ambulating to bathroom no cp no sob no nausea no vomiting no dizziness

## 2023-10-29 NOTE — H&P ADULT - PROBLEM SELECTOR PLAN 1
- P/w 1wk h/o fever, chills, arthralgias, fatigue  - Of note, diffuse palpable purpura across chest, back, b/l LEs on exam, not pruritic or painful  - In ED, SIRS criteria w/ tachycardia, hypotension, leukocytosis, elevated lactate  - Lactate downtrending, CTM  - Elevated ESR, CRP  - S/p 1x dose vancomycin, cefepime in ED  - S/p 3L NS  - C/w ___________  - Blood cx pending - P/w 1wk h/o fever, chills, arthralgias, fatigue  - Of note, diffuse palpable purpura across chest, back, b/l LEs on exam, not pruritic or painful  - In ED, SIRS criteria w/ tachycardia, hypotension, leukocytosis, elevated lactate  - Lactate downtrending, CTM  - Elevated ESR, CRP  - S/p 1x dose vancomycin, cefepime in ED  - S/p 3L NS  - C/w vancomycin, cefepime  - Blood cx pending  - Consider dermatology consult in AM if rash persists

## 2023-10-29 NOTE — ED ADULT TRIAGE NOTE - CHIEF COMPLAINT QUOTE
pt living with DM type2, c/o of occasional chills, fever on and off and  nausea for few days as well, denies any vomiting, denies dysuria, denies cough

## 2023-10-29 NOTE — H&P ADULT - NSHPPHYSICALEXAM_GEN_ALL_CORE
LOS:     VITALS:   T(C): 37.3 (10-29-23 @ 15:25), Max: 37.3 (10-29-23 @ 15:25)  HR: 89 (10-29-23 @ 15:25) (89 - 100)  BP: 129/74 (10-29-23 @ 15:25) (89/55 - 129/74)  RR: 20 (10-29-23 @ 15:25) (18 - 20)  SpO2: 96% (10-29-23 @ 15:25) (96% - 98%)    GENERAL: NAD, lying in bed comfortably  HEAD:  Atraumatic, Normocephalic  EYES: EOMI, PERRLA, conjunctiva and sclera clear  ENT: Moist mucous membranes  NECK: Supple, No JVD  CHEST/LUNG: Clear to auscultation bilaterally; No rales, rhonchi, wheezing, or rubs. Unlabored respirations  HEART: Regular rate and rhythm; No murmurs, rubs, or gallops  ABDOMEN: BSx4; Soft, nontender, nondistended  EXTREMITIES:  2+ Peripheral Pulses, brisk capillary refill. No clubbing, cyanosis, or edema  NERVOUS SYSTEM:  A&Ox3, no focal deficits   SKIN: No rashes or lesions LOS:     VITALS:   T(C): 37.3 (10-29-23 @ 15:25), Max: 37.3 (10-29-23 @ 15:25)  HR: 89 (10-29-23 @ 15:25) (89 - 100)  BP: 129/74 (10-29-23 @ 15:25) (89/55 - 129/74)  RR: 20 (10-29-23 @ 15:25) (18 - 20)  SpO2: 96% (10-29-23 @ 15:25) (96% - 98%)    GENERAL: NAD, lying in bed comfortably  HEAD:  Atraumatic, Normocephalic  EYES: EOMI, PERRLA, conjunctiva and sclera clear  ENT: Moist mucous membranes  NECK: Supple, No JVD  CHEST/LUNG: Clear to auscultation bilaterally; No rales, rhonchi, wheezing, or rubs. Unlabored respirations  HEART: Regular rate and rhythm; No murmurs, rubs, or gallops  ABDOMEN: BSx4; Soft, nontender, nondistended  EXTREMITIES:  2+ Peripheral Pulses, brisk capillary refill. No clubbing, cyanosis, or edema  NERVOUS SYSTEM:  A&Ox3, no focal deficits   SKIN: Diffuse palpable purpura across chest, back, b/l LEs

## 2023-10-30 ENCOUNTER — RESULT REVIEW (OUTPATIENT)
Age: 63
End: 2023-10-30

## 2023-10-30 DIAGNOSIS — A41.9 SEPSIS, UNSPECIFIED ORGANISM: ICD-10-CM

## 2023-10-30 DIAGNOSIS — R74.01 ELEVATION OF LEVELS OF LIVER TRANSAMINASE LEVELS: ICD-10-CM

## 2023-10-30 LAB
A1C WITH ESTIMATED AVERAGE GLUCOSE RESULT: 6.8 % — HIGH (ref 4–5.6)
A1C WITH ESTIMATED AVERAGE GLUCOSE RESULT: 6.8 % — HIGH (ref 4–5.6)
ALBUMIN SERPL ELPH-MCNC: 3 G/DL — LOW (ref 3.3–5)
ALBUMIN SERPL ELPH-MCNC: 3 G/DL — LOW (ref 3.3–5)
ALP SERPL-CCNC: 67 U/L — SIGNIFICANT CHANGE UP (ref 40–120)
ALP SERPL-CCNC: 67 U/L — SIGNIFICANT CHANGE UP (ref 40–120)
ALT FLD-CCNC: 83 U/L — HIGH (ref 4–33)
ALT FLD-CCNC: 83 U/L — HIGH (ref 4–33)
ANION GAP SERPL CALC-SCNC: 13 MMOL/L — SIGNIFICANT CHANGE UP (ref 7–14)
ANION GAP SERPL CALC-SCNC: 13 MMOL/L — SIGNIFICANT CHANGE UP (ref 7–14)
AST SERPL-CCNC: 52 U/L — HIGH (ref 4–32)
AST SERPL-CCNC: 52 U/L — HIGH (ref 4–32)
BILIRUB SERPL-MCNC: 0.4 MG/DL — SIGNIFICANT CHANGE UP (ref 0.2–1.2)
BILIRUB SERPL-MCNC: 0.4 MG/DL — SIGNIFICANT CHANGE UP (ref 0.2–1.2)
BLOOD GAS VENOUS COMPREHENSIVE RESULT: SIGNIFICANT CHANGE UP
BLOOD GAS VENOUS COMPREHENSIVE RESULT: SIGNIFICANT CHANGE UP
BUN SERPL-MCNC: 8 MG/DL — SIGNIFICANT CHANGE UP (ref 7–23)
BUN SERPL-MCNC: 8 MG/DL — SIGNIFICANT CHANGE UP (ref 7–23)
CALCIUM SERPL-MCNC: 8 MG/DL — LOW (ref 8.4–10.5)
CALCIUM SERPL-MCNC: 8 MG/DL — LOW (ref 8.4–10.5)
CHLORIDE SERPL-SCNC: 104 MMOL/L — SIGNIFICANT CHANGE UP (ref 98–107)
CHLORIDE SERPL-SCNC: 104 MMOL/L — SIGNIFICANT CHANGE UP (ref 98–107)
CHOLEST SERPL-MCNC: 133 MG/DL — SIGNIFICANT CHANGE UP
CHOLEST SERPL-MCNC: 133 MG/DL — SIGNIFICANT CHANGE UP
CO2 SERPL-SCNC: 19 MMOL/L — LOW (ref 22–31)
CO2 SERPL-SCNC: 19 MMOL/L — LOW (ref 22–31)
CREAT SERPL-MCNC: 0.6 MG/DL — SIGNIFICANT CHANGE UP (ref 0.5–1.3)
CREAT SERPL-MCNC: 0.6 MG/DL — SIGNIFICANT CHANGE UP (ref 0.5–1.3)
CULTURE RESULTS: SIGNIFICANT CHANGE UP
CULTURE RESULTS: SIGNIFICANT CHANGE UP
EGFR: 101 ML/MIN/1.73M2 — SIGNIFICANT CHANGE UP
EGFR: 101 ML/MIN/1.73M2 — SIGNIFICANT CHANGE UP
ESTIMATED AVERAGE GLUCOSE: 148 — SIGNIFICANT CHANGE UP
ESTIMATED AVERAGE GLUCOSE: 148 — SIGNIFICANT CHANGE UP
GLUCOSE BLDC GLUCOMTR-MCNC: 211 MG/DL — HIGH (ref 70–99)
GLUCOSE BLDC GLUCOMTR-MCNC: 211 MG/DL — HIGH (ref 70–99)
GLUCOSE BLDC GLUCOMTR-MCNC: 218 MG/DL — HIGH (ref 70–99)
GLUCOSE BLDC GLUCOMTR-MCNC: 218 MG/DL — HIGH (ref 70–99)
GLUCOSE BLDC GLUCOMTR-MCNC: 267 MG/DL — HIGH (ref 70–99)
GLUCOSE BLDC GLUCOMTR-MCNC: 267 MG/DL — HIGH (ref 70–99)
GLUCOSE BLDC GLUCOMTR-MCNC: 350 MG/DL — HIGH (ref 70–99)
GLUCOSE BLDC GLUCOMTR-MCNC: 350 MG/DL — HIGH (ref 70–99)
GLUCOSE BLDC GLUCOMTR-MCNC: 355 MG/DL — HIGH (ref 70–99)
GLUCOSE BLDC GLUCOMTR-MCNC: 355 MG/DL — HIGH (ref 70–99)
GLUCOSE BLDC GLUCOMTR-MCNC: 371 MG/DL — HIGH (ref 70–99)
GLUCOSE BLDC GLUCOMTR-MCNC: 371 MG/DL — HIGH (ref 70–99)
GLUCOSE BLDC GLUCOMTR-MCNC: 395 MG/DL — HIGH (ref 70–99)
GLUCOSE BLDC GLUCOMTR-MCNC: 395 MG/DL — HIGH (ref 70–99)
GLUCOSE SERPL-MCNC: 269 MG/DL — HIGH (ref 70–99)
GLUCOSE SERPL-MCNC: 269 MG/DL — HIGH (ref 70–99)
HAV IGM SER-ACNC: SIGNIFICANT CHANGE UP
HAV IGM SER-ACNC: SIGNIFICANT CHANGE UP
HBV CORE IGM SER-ACNC: SIGNIFICANT CHANGE UP
HBV CORE IGM SER-ACNC: SIGNIFICANT CHANGE UP
HBV SURFACE AG SER-ACNC: SIGNIFICANT CHANGE UP
HBV SURFACE AG SER-ACNC: SIGNIFICANT CHANGE UP
HCT VFR BLD CALC: 30.2 % — LOW (ref 34.5–45)
HCT VFR BLD CALC: 30.2 % — LOW (ref 34.5–45)
HCV AB S/CO SERPL IA: 0.07 S/CO — SIGNIFICANT CHANGE UP (ref 0–0.99)
HCV AB S/CO SERPL IA: 0.07 S/CO — SIGNIFICANT CHANGE UP (ref 0–0.99)
HCV AB SERPL-IMP: SIGNIFICANT CHANGE UP
HCV AB SERPL-IMP: SIGNIFICANT CHANGE UP
HDLC SERPL-MCNC: 53 MG/DL — SIGNIFICANT CHANGE UP
HDLC SERPL-MCNC: 53 MG/DL — SIGNIFICANT CHANGE UP
HGB BLD-MCNC: 9.8 G/DL — LOW (ref 11.5–15.5)
HGB BLD-MCNC: 9.8 G/DL — LOW (ref 11.5–15.5)
LIPID PNL WITH DIRECT LDL SERPL: 61 MG/DL — SIGNIFICANT CHANGE UP
LIPID PNL WITH DIRECT LDL SERPL: 61 MG/DL — SIGNIFICANT CHANGE UP
MAGNESIUM SERPL-MCNC: 1.8 MG/DL — SIGNIFICANT CHANGE UP (ref 1.6–2.6)
MAGNESIUM SERPL-MCNC: 1.8 MG/DL — SIGNIFICANT CHANGE UP (ref 1.6–2.6)
MCHC RBC-ENTMCNC: 26 PG — LOW (ref 27–34)
MCHC RBC-ENTMCNC: 26 PG — LOW (ref 27–34)
MCHC RBC-ENTMCNC: 32.5 GM/DL — SIGNIFICANT CHANGE UP (ref 32–36)
MCHC RBC-ENTMCNC: 32.5 GM/DL — SIGNIFICANT CHANGE UP (ref 32–36)
MCV RBC AUTO: 80.1 FL — SIGNIFICANT CHANGE UP (ref 80–100)
MCV RBC AUTO: 80.1 FL — SIGNIFICANT CHANGE UP (ref 80–100)
MRSA PCR RESULT.: SIGNIFICANT CHANGE UP
MRSA PCR RESULT.: SIGNIFICANT CHANGE UP
NON HDL CHOLESTEROL: 80 MG/DL — SIGNIFICANT CHANGE UP
NON HDL CHOLESTEROL: 80 MG/DL — SIGNIFICANT CHANGE UP
NRBC # BLD: 0 /100 WBCS — SIGNIFICANT CHANGE UP (ref 0–0)
NRBC # BLD: 0 /100 WBCS — SIGNIFICANT CHANGE UP (ref 0–0)
NRBC # FLD: 0 K/UL — SIGNIFICANT CHANGE UP (ref 0–0)
NRBC # FLD: 0 K/UL — SIGNIFICANT CHANGE UP (ref 0–0)
PLATELET # BLD AUTO: 171 K/UL — SIGNIFICANT CHANGE UP (ref 150–400)
PLATELET # BLD AUTO: 171 K/UL — SIGNIFICANT CHANGE UP (ref 150–400)
POTASSIUM SERPL-MCNC: 3.5 MMOL/L — SIGNIFICANT CHANGE UP (ref 3.5–5.3)
POTASSIUM SERPL-MCNC: 3.5 MMOL/L — SIGNIFICANT CHANGE UP (ref 3.5–5.3)
POTASSIUM SERPL-SCNC: 3.5 MMOL/L — SIGNIFICANT CHANGE UP (ref 3.5–5.3)
POTASSIUM SERPL-SCNC: 3.5 MMOL/L — SIGNIFICANT CHANGE UP (ref 3.5–5.3)
PROT SERPL-MCNC: 6.4 G/DL — SIGNIFICANT CHANGE UP (ref 6–8.3)
PROT SERPL-MCNC: 6.4 G/DL — SIGNIFICANT CHANGE UP (ref 6–8.3)
RBC # BLD: 3.77 M/UL — LOW (ref 3.8–5.2)
RBC # BLD: 3.77 M/UL — LOW (ref 3.8–5.2)
RBC # FLD: 14.9 % — HIGH (ref 10.3–14.5)
RBC # FLD: 14.9 % — HIGH (ref 10.3–14.5)
S AUREUS DNA NOSE QL NAA+PROBE: SIGNIFICANT CHANGE UP
S AUREUS DNA NOSE QL NAA+PROBE: SIGNIFICANT CHANGE UP
SODIUM SERPL-SCNC: 136 MMOL/L — SIGNIFICANT CHANGE UP (ref 135–145)
SODIUM SERPL-SCNC: 136 MMOL/L — SIGNIFICANT CHANGE UP (ref 135–145)
SPECIMEN SOURCE: SIGNIFICANT CHANGE UP
SPECIMEN SOURCE: SIGNIFICANT CHANGE UP
T4 FREE+ TSH PNL SERPL: 0.31 UIU/ML — SIGNIFICANT CHANGE UP (ref 0.27–4.2)
T4 FREE+ TSH PNL SERPL: 0.31 UIU/ML — SIGNIFICANT CHANGE UP (ref 0.27–4.2)
TRIGL SERPL-MCNC: 94 MG/DL — SIGNIFICANT CHANGE UP
TRIGL SERPL-MCNC: 94 MG/DL — SIGNIFICANT CHANGE UP
WBC # BLD: 12.86 K/UL — HIGH (ref 3.8–10.5)
WBC # BLD: 12.86 K/UL — HIGH (ref 3.8–10.5)
WBC # FLD AUTO: 12.86 K/UL — HIGH (ref 3.8–10.5)
WBC # FLD AUTO: 12.86 K/UL — HIGH (ref 3.8–10.5)

## 2023-10-30 PROCEDURE — 11105 PUNCH BX SKIN EA SEP/ADDL: CPT

## 2023-10-30 PROCEDURE — 88350 IMFLUOR EA ADDL 1ANTB STN PX: CPT | Mod: 26

## 2023-10-30 PROCEDURE — 71045 X-RAY EXAM CHEST 1 VIEW: CPT | Mod: 26

## 2023-10-30 PROCEDURE — 88346 IMFLUOR 1ST 1ANTB STAIN PX: CPT | Mod: 26

## 2023-10-30 PROCEDURE — 88305 TISSUE EXAM BY PATHOLOGIST: CPT | Mod: 26

## 2023-10-30 PROCEDURE — 99232 SBSQ HOSP IP/OBS MODERATE 35: CPT

## 2023-10-30 PROCEDURE — 99232 SBSQ HOSP IP/OBS MODERATE 35: CPT | Mod: 25

## 2023-10-30 PROCEDURE — 11104 PUNCH BX SKIN SINGLE LESION: CPT

## 2023-10-30 RX ORDER — KETOROLAC TROMETHAMINE 30 MG/ML
15 SYRINGE (ML) INJECTION ONCE
Refills: 0 | Status: DISCONTINUED | OUTPATIENT
Start: 2023-10-30 | End: 2023-10-30

## 2023-10-30 RX ORDER — ACETAMINOPHEN 500 MG
1000 TABLET ORAL ONCE
Refills: 0 | Status: COMPLETED | OUTPATIENT
Start: 2023-10-30 | End: 2023-10-30

## 2023-10-30 RX ADMIN — ENOXAPARIN SODIUM 40 MILLIGRAM(S): 100 INJECTION SUBCUTANEOUS at 18:01

## 2023-10-30 RX ADMIN — Medication 1000 MILLIGRAM(S): at 03:50

## 2023-10-30 RX ADMIN — Medication 250 MILLIGRAM(S): at 03:41

## 2023-10-30 RX ADMIN — Medication 100 MILLIGRAM(S): at 23:24

## 2023-10-30 RX ADMIN — Medication 2: at 22:35

## 2023-10-30 RX ADMIN — Medication 15 MILLIGRAM(S): at 01:48

## 2023-10-30 RX ADMIN — Medication 400 MILLIGRAM(S): at 03:20

## 2023-10-30 RX ADMIN — Medication 2: at 09:17

## 2023-10-30 RX ADMIN — CEFEPIME 100 MILLIGRAM(S): 1 INJECTION, POWDER, FOR SOLUTION INTRAMUSCULAR; INTRAVENOUS at 05:04

## 2023-10-30 RX ADMIN — CEFEPIME 100 MILLIGRAM(S): 1 INJECTION, POWDER, FOR SOLUTION INTRAMUSCULAR; INTRAVENOUS at 17:18

## 2023-10-30 RX ADMIN — Medication 650 MILLIGRAM(S): at 19:39

## 2023-10-30 RX ADMIN — Medication 5: at 17:55

## 2023-10-30 RX ADMIN — Medication 250 MILLIGRAM(S): at 15:33

## 2023-10-30 RX ADMIN — Medication 3: at 13:36

## 2023-10-30 RX ADMIN — Medication 650 MILLIGRAM(S): at 18:02

## 2023-10-30 RX ADMIN — Medication 100 MILLIGRAM(S): at 01:48

## 2023-10-30 NOTE — CONSULT NOTE ADULT - ASSESSMENT
Rash  - patient with fevers, malaise, myalgias and arthralgias?, and nausea for 1 week. rash for 3 days and asymptomatic.   - differential is broad including urticarial vasculitis, vs connective tissue disease vs neutrophilic dermatosis such as sweets syndrome  - today obtained skin bx for C[facility] x2, one for H&E and another for DIF.    Dermatology Punch Biopsy Procedure Note    After risks and benefits of procedure including bleeding, infection and scar were reviewed (consents including photo consent reviewed, signed and in chart), allergies were reviewed and time out performed. Written consent given by the patient.    Area cleaned with rubbing alcohol and anesthetized with lidocaine and epinephrine.  # 2, 4mm punch biopsies were performed to  L upper arm (both lesional, one for H&E and another for DIF) hemostasis achieved with a single dissolvable chromic gut suture with pressure dressing placed. Wound care reviewed with patient and team. Biopsy site should remain covered with pressure bandage for 24-48 hours. Then apply Vaseline to biopsy site daily and cover with bandage until healed.      The patient's chart was reviewed in addition to photos of the rash taken by the primary team with the permission of the patient.  Patient was seen at bedside  with the dermatology attending Dr. Hickman.   Recommendations were communicated with the primary team.  Please page [phone] for further related questions.    Juan Art MD  Resident Physician, PGY3  [facility] Dermat[facility]  Pager: [phone]

## 2023-10-30 NOTE — PROGRESS NOTE ADULT - SUBJECTIVE AND OBJECTIVE BOX
PROGRESS NOTE:     Patient is a 63y old  Female who presents with a chief complaint of Sepsis of unclear etiology (29 Oct 2023 18:04)      SUBJECTIVE / OVERNIGHT EVENTS:    OVERNIGHT: Fever to 102.2F overnight.      Patient was examined at bedside and continues to endorse fever, chills, arthralgias, and weakness. Denies chest pain, SOB, nausea, vomiting. ROS otherwise negative and pt is amenable to current treatment plan.      REVIEW OF SYSTEMS:    CONSTITUTIONAL:  Endorses weakness, fever, chills  EYES/ENT:  No visual changes, vertigo, or throat pain   NECK:  No pain or stiffness  RESPIRATORY:  No SOB, cough, wheezing, hemoptysis  CARDIOVASCULAR:  No chest pain or palpitations  GASTROINTESTINAL:  No abdominal pain, nausea, vomiting, or hematemesis; No diarrhea or constipation. No melena or hematochezia.  GENITOURINARY:  No dysuria, change in frequency, or hematuria  NEUROLOGICAL:  No numbness or weakness  SKIN:  Diffuse skin lesions of b/l LEs, chest, back      MEDICATIONS  (STANDING):  cefepime   IVPB 2000 milliGRAM(s) IV Intermittent every 12 hours  dextrose 5%. 1000 milliLiter(s) (50 mL/Hr) IV Continuous <Continuous>  dextrose 5%. 1000 milliLiter(s) (100 mL/Hr) IV Continuous <Continuous>  dextrose 50% Injectable 25 Gram(s) IV Push once  dextrose 50% Injectable 25 Gram(s) IV Push once  dextrose 50% Injectable 12.5 Gram(s) IV Push once  enoxaparin Injectable 40 milliGRAM(s) SubCutaneous every 24 hours  glucagon  Injectable 1 milliGRAM(s) IntraMuscular once  insulin lispro (ADMELOG) corrective regimen sliding scale   SubCutaneous three times a day before meals  insulin lispro (ADMELOG) corrective regimen sliding scale   SubCutaneous at bedtime  vancomycin  IVPB 1000 milliGRAM(s) IV Intermittent every 12 hours    MEDICATIONS  (PRN):  acetaminophen     Tablet .. 650 milliGRAM(s) Oral every 6 hours PRN Temp greater or equal to 38C (100.4F), Mild Pain (1 - 3)  benzonatate 100 milliGRAM(s) Oral three times a day PRN Cough  dextrose Oral Gel 15 Gram(s) Oral once PRN Blood Glucose LESS THAN 70 milliGRAM(s)/deciliter  guaiFENesin Oral Liquid (Sugar-Free) 100 milliGRAM(s) Oral every 6 hours PRN Cough      CAPILLARY BLOOD GLUCOSE      POCT Blood Glucose.: 218 mg/dL (30 Oct 2023 09:15)  POCT Blood Glucose.: 211 mg/dL (30 Oct 2023 01:04)  POCT Blood Glucose.: 240 mg/dL (29 Oct 2023 21:43)  POCT Blood Glucose.: 213 mg/dL (29 Oct 2023 19:32)  POCT Blood Glucose.: 483 mg/dL (29 Oct 2023 12:51)    I&O's Summary      PHYSICAL EXAM:  Vital Signs Last 24 Hrs  T(C): 37.8 (30 Oct 2023 10:31), Max: 40.7 (29 Oct 2023 19:58)  T(F): 100.1 (30 Oct 2023 10:31), Max: 105.2 (29 Oct 2023 19:58)  HR: 116 (30 Oct 2023 10:31) (89 - 116)  BP: 138/78 (30 Oct 2023 10:31) (89/55 - 149/98)  BP(mean): --  RR: 18 (30 Oct 2023 10:31) (17 - 20)  SpO2: 100% (30 Oct 2023 10:31) (96% - 100%)    Parameters below as of 30 Oct 2023 10:31  Patient On (Oxygen Delivery Method): room air        CONSTITUTIONAL: NAD; well-developed  HEENT: PERRL, clear conjunctiva  RESPIRATORY: Normal respiratory effort; lungs are clear to auscultation bilaterally; No Crackles/Rhonchi/Wheezing  CARDIOVASCULAR: Regular rate and rhythm, normal S1 and S2, no murmur/rub/gallop; No lower extremity edema; Peripheral pulses are 2+ bilaterally  ABDOMEN: Nontender to palpation, normoactive bowel sounds, no rebound/guarding; No hepatosplenomegaly  MUSCULOSKELETAL: no clubbing or cyanosis of digits; no joint swelling or tenderness to palpation  EXTREMITY: Diffuse palpable purpura of b/l LEs, chest, back  NEURO: A&Ox3; no focal deficits   PSYCH: normal mood; Affect appropriate    LABS:                        9.8    12.86 )-----------( 171      ( 30 Oct 2023 07:53 )             30.2     10-30    136  |  104  |  8   ----------------------------<  269<H>  3.5   |  19<L>  |  0.60    Ca    8.0<L>      30 Oct 2023 07:53  Mg     1.80     10-30    TPro  6.4  /  Alb  3.0<L>  /  TBili  0.4  /  DBili  x   /  AST  52<H>  /  ALT  83<H>  /  AlkPhos  67  10-30          Urinalysis Basic - ( 30 Oct 2023 07:53 )    Color: x / Appearance: x / SG: x / pH: x  Gluc: 269 mg/dL / Ketone: x  / Bili: x / Urobili: x   Blood: x / Protein: x / Nitrite: x   Leuk Esterase: x / RBC: x / WBC x   Sq Epi: x / Non Sq Epi: x / Bacteria: x        Culture - Urine (collected 29 Oct 2023 16:00)  Source: Clean Catch Clean Catch (Midstream)  Final Report (30 Oct 2023 11:26):    <10,000 CFU/mL Normal Urogenital Ale        RADIOLOGY & ADDITIONAL TESTS:    CXR pending

## 2023-10-30 NOTE — CONSULT NOTE ADULT - ATTENDING COMMENTS
Blanching erythematous, slightly edematous papules on trunk and extremities, preceded by fever. The ddx is broad and includes a reactive process to infection (most commonly viral) vs inflammatory (i.e. Sweet's syndrome, urticarial vasculitis) vs a drug hypersensitivity (however no obvious culprit). Would treat empirically as infection for now-- expand viral workup kashmir given elevated LFTS to include full RVP (i.e. entero/rhino, parainfluenza, coronavirus, mycoplasma) and serum testing for parvovirus, CMV and EBV. If abdominal symptoms persist could also consider CTAP. If infectious workup negative, would consider screening for CTD. We have taken a skin biopsy and will follow.

## 2023-10-30 NOTE — PATIENT PROFILE ADULT - FALL HARM RISK - HARM RISK INTERVENTIONS

## 2023-10-30 NOTE — PROGRESS NOTE ADULT - PROBLEM SELECTOR PLAN 4
Informed pt of lab results and that Flagyl was sent to the pharmacy on file. Pt verbalized understanding.   - Holding home statin iso elevated LFTs

## 2023-10-30 NOTE — PROGRESS NOTE ADULT - PROBLEM SELECTOR PLAN 1
- P/w 1wk h/o fever, chills, arthralgias, fatigue  - Of note, diffuse palpable purpura across chest, back, b/l LEs on exam, not pruritic or painful  - In ED, SIRS criteria w/ tachycardia, hypotension, leukocytosis, elevated lactate  - Lactate downtrending, CTM  - Elevated ESR, CRP  - S/p 1x dose vancomycin, cefepime in ED  - S/p 3L NS  - C/w vancomycin, cefepime  - Blood cx pending  - Urine chlamydia, gonococcal pending  - CXR pending  - Dermatology consulted, will appreciate recs

## 2023-10-30 NOTE — CONSULT NOTE ADULT - SUBJECTIVE AND OBJECTIVE BOX
HPI:  Ms. Pham Reynolds is a 63-year-old female w/ PMH of HLD and T2DM p/w a 1 week history of intermittent fevers, chills, nausea, and generalized weakness. Patient reports fatigue and generalized weakness starting 1 week ago. About 5 days ago, she began to endorse fever and chills. Overnight she began to experience arthralgias and then noticed a diffuse rash across her chest, back and b/l LEs this morning. Also of note, patient endorses one episode of red spotting in BM this morning. Symptoms have also been a/w nausea and vomiting. Denies SOB, diarrhea constipation, and dysuria. No sick contacts. In ED, patient was afebrile, hypotensive to 89/55, and tachycardic to 100. Labs significant for leukocytosis of 14.98 and lactate of 2.8. Patient received 1x dose of cefepime, vancomycin and 3L NS. Patient admitted to medicine for further workup and management.   (29 Oct 2023 18:04)    Derm HPI:   Patient with 1 week hx of fever, chills, malaise, myalgias, nausea (no vomiting) and weakness for 1 week. She noticed a rash on 3 days ago. denies any itch or pain associated with the rash. Denies any new meds in the last 3 months except oxycodone and naproxen taken 2-3 months ago for shoulder pain. reports loosing a "few pounds" in the last couple of months. Denies sick contacts.     PAST MEDICAL & SURGICAL HISTORY:  HLD (hyperlipidemia)      DM (diabetes mellitus)      No significant past surgical history          Review of Systems:  REVIEW OF SYSTEMS      General: see HPI    Skin/Breast: see HPI  	  Ophthalmologic: no eye pain or change in vision  	  ENMT: no dysphagia or change in hearing    Respiratory and Thorax: no SOB or cough  	  Cardiovascular: no palpitations or chest pain    Gastrointestinal: see HPI    Genitourinary: no dysuria or frequency    Musculoskeletal:see HPI    Neurological:  numbness , or tingling    MEDICATIONS  (STANDING):  cefepime   IVPB 2000 milliGRAM(s) IV Intermittent every 12 hours  dextrose 5%. 1000 milliLiter(s) IV Continuous <Continuous>  dextrose 5%. 1000 milliLiter(s) IV Continuous <Continuous>  dextrose 50% Injectable 25 Gram(s) IV Push once  dextrose 50% Injectable 25 Gram(s) IV Push once  dextrose 50% Injectable 12.5 Gram(s) IV Push once  enoxaparin Injectable 40 milliGRAM(s) SubCutaneous every 24 hours  glucagon  Injectable 1 milliGRAM(s) IntraMuscular once  insulin lispro (ADMELOG) corrective regimen sliding scale   SubCutaneous at bedtime  insulin lispro (ADMELOG) corrective regimen sliding scale   SubCutaneous three times a day before meals    ALLERGIES: penicillins        SOCIAL HISTORY:  ____________________________________  Social History: lives with her 5 siblings and her nephew, has a 1.5 year old cat.   Does not use tobacco products, consume alcohol or partake in illicit drug use  FAMILY HISTORY:  FH: type 2 diabetes (Father, Mother, Sibling)          VITAL SIGNS LAST 24 HOURS:  T(F): 99.4 (10-30 @ 14:20), Max: 102.2 (10-30 @ 02:50)  HR: 104 (10-30 @ 14:20) (98 - 126)  BP: 128/63 (10-30 @ 14:20) (111/53 - 138/78)  RR: 16 (10-30 @ 14:20) (16 - 18)    PHYSICAL EXAM:     The patient was alert and oriented X 3,   and in no  apparent distress.  OP showed no ulcerations  There was no visible lymphadenopathy.  Conjunctiva were non injected  The scalp, hair, face, eyebrows, lips, OP, neck, chest, back,   extremities X 4, nails were examined.  There was no hyperhidrosis or bromhidrosis.    Of note on skin exam:   diffuse scattered blanching edematous pink papules on the proximal b/l arms, trunk, and b/l LE. some of the papules on the distal legs appear purpuric. L dorsal hand>R with mild warmth and pink papules on the dorsal aspect. palms and soles clear.   ____________________________________    LABS:                        9.8    12.86 )-----------( 171      ( 30 Oct 2023 07:53 )             30.2     10-30    136  |  104  |  8   ----------------------------<  269<H>  3.5   |  19<L>  |  0.60    Ca    8.0<L>      30 Oct 2023 07:53  Mg     1.80     10-30    TPro  6.4  /  Alb  3.0<L>  /  TBili  0.4  /  DBili  x   /  AST  52<H>  /  ALT  83<H>  /  AlkPhos  67  10-30      Urinalysis Basic - ( 30 Oct 2023 07:53 )    Color: x / Appearance: x / SG: x / pH: x  Gluc: 269 mg/dL / Ketone: x  / Bili: x / Urobili: x   Blood: x / Protein: x / Nitrite: x   Leuk Esterase: x / RBC: x / WBC x   Sq Epi: x / Non Sq Epi: x / Bacteria: x

## 2023-10-30 NOTE — PATIENT PROFILE ADULT - FUNCTIONAL ASSESSMENT - DAILY ACTIVITY 3.
Nutrition services: Day 1 of admit. Jose De La Vega is a 1 m.o. female with admitting DX of acute respiratory distress.   Consult received for weight and length < 1st %ile.     I met with mother at bedside this afternoon.  She reports that at home infant has been stable on her feeding routine with Puramino 24 kcal/ounce.  She eats about every 3 hours even at night, sometime she will go 3.5 hours, between feeds. Mother reports that Jose was typically taking 65-70 ml/feed but when she got sick this decreased to 40-55 ml/feed.  Home routine at baseline appears to provide approximately 364 - 416 kcal/day, 150 - 170 kcal/kg.  Mother can not remember the recipe off the top of her hear but she has it at home and batches it in 12 hours volumes for the twins. She suspects it is something like 576 ml of water + 24 scoops.  This would be correct to make 24 kcal/ounce.     Assessment:  Weight: 2.445 kg; 4th %ile / z-score -1.76   Length/Height: 45.5 cm; 5th %ile / z-score -1.65   %ile's and z-score per [place] growth chart    Estimated Nutrition Needs:  RDA: 110 - 130 kcal/day    Evaluation:   Pertinent history: Twin, born at 31.4 weeks gestation, spent sometime in the NICU.   Weight down in the last few days if trend correct. Will monitor for ongoing trends.   Mother reports they have recently signed on with [facility]. Recommended dietitian follow-up through [facility] as needed.   Labs and meds reviewed   Ferrous sulfate and vitamin D ordered  Last BM yesterday.     Malnutrition Risk: Weight-for-age z-score down 1.33 SD since birth which is concerning for moderate malnutrition. Will monitor for ongoing trend.     Recommendations/Plan:  Continue with home feeding routine - Puramino 24 kcal/ounce. Recipe: 48 ml water + 2 scoops yields ~ 2 ounces.  Daily goal: 13 ounces per day to meet nutrition needs.   Daily weights.     RD monitoring        4 = No assist / stand by assistance

## 2023-10-31 LAB
ALBUMIN SERPL ELPH-MCNC: 3.3 G/DL — SIGNIFICANT CHANGE UP (ref 3.3–5)
ALBUMIN SERPL ELPH-MCNC: 3.3 G/DL — SIGNIFICANT CHANGE UP (ref 3.3–5)
ALP SERPL-CCNC: 106 U/L — SIGNIFICANT CHANGE UP (ref 40–120)
ALP SERPL-CCNC: 106 U/L — SIGNIFICANT CHANGE UP (ref 40–120)
ALT FLD-CCNC: 120 U/L — HIGH (ref 4–33)
ALT FLD-CCNC: 120 U/L — HIGH (ref 4–33)
ANION GAP SERPL CALC-SCNC: 13 MMOL/L — SIGNIFICANT CHANGE UP (ref 7–14)
ANION GAP SERPL CALC-SCNC: 13 MMOL/L — SIGNIFICANT CHANGE UP (ref 7–14)
AST SERPL-CCNC: 49 U/L — HIGH (ref 4–32)
AST SERPL-CCNC: 49 U/L — HIGH (ref 4–32)
B PERT DNA SPEC QL NAA+PROBE: SIGNIFICANT CHANGE UP
B PERT DNA SPEC QL NAA+PROBE: SIGNIFICANT CHANGE UP
B PERT+PARAPERT DNA PNL SPEC NAA+PROBE: SIGNIFICANT CHANGE UP
B PERT+PARAPERT DNA PNL SPEC NAA+PROBE: SIGNIFICANT CHANGE UP
BILIRUB SERPL-MCNC: 0.5 MG/DL — SIGNIFICANT CHANGE UP (ref 0.2–1.2)
BILIRUB SERPL-MCNC: 0.5 MG/DL — SIGNIFICANT CHANGE UP (ref 0.2–1.2)
BORDETELLA PARAPERTUSSIS (RAPRVP): SIGNIFICANT CHANGE UP
BORDETELLA PARAPERTUSSIS (RAPRVP): SIGNIFICANT CHANGE UP
BUN SERPL-MCNC: 8 MG/DL — SIGNIFICANT CHANGE UP (ref 7–23)
BUN SERPL-MCNC: 8 MG/DL — SIGNIFICANT CHANGE UP (ref 7–23)
C PNEUM DNA SPEC QL NAA+PROBE: SIGNIFICANT CHANGE UP
C PNEUM DNA SPEC QL NAA+PROBE: SIGNIFICANT CHANGE UP
C TRACH RRNA SPEC QL NAA+PROBE: SIGNIFICANT CHANGE UP
C TRACH RRNA SPEC QL NAA+PROBE: SIGNIFICANT CHANGE UP
CALCIUM SERPL-MCNC: 8.5 MG/DL — SIGNIFICANT CHANGE UP (ref 8.4–10.5)
CALCIUM SERPL-MCNC: 8.5 MG/DL — SIGNIFICANT CHANGE UP (ref 8.4–10.5)
CHLORIDE SERPL-SCNC: 101 MMOL/L — SIGNIFICANT CHANGE UP (ref 98–107)
CHLORIDE SERPL-SCNC: 101 MMOL/L — SIGNIFICANT CHANGE UP (ref 98–107)
CO2 SERPL-SCNC: 19 MMOL/L — LOW (ref 22–31)
CO2 SERPL-SCNC: 19 MMOL/L — LOW (ref 22–31)
CREAT SERPL-MCNC: 0.69 MG/DL — SIGNIFICANT CHANGE UP (ref 0.5–1.3)
CREAT SERPL-MCNC: 0.69 MG/DL — SIGNIFICANT CHANGE UP (ref 0.5–1.3)
EGFR: 98 ML/MIN/1.73M2 — SIGNIFICANT CHANGE UP
EGFR: 98 ML/MIN/1.73M2 — SIGNIFICANT CHANGE UP
FLUAV SUBTYP SPEC NAA+PROBE: SIGNIFICANT CHANGE UP
FLUAV SUBTYP SPEC NAA+PROBE: SIGNIFICANT CHANGE UP
FLUBV RNA SPEC QL NAA+PROBE: SIGNIFICANT CHANGE UP
FLUBV RNA SPEC QL NAA+PROBE: SIGNIFICANT CHANGE UP
GLUCOSE BLDC GLUCOMTR-MCNC: 230 MG/DL — HIGH (ref 70–99)
GLUCOSE BLDC GLUCOMTR-MCNC: 230 MG/DL — HIGH (ref 70–99)
GLUCOSE BLDC GLUCOMTR-MCNC: 252 MG/DL — HIGH (ref 70–99)
GLUCOSE BLDC GLUCOMTR-MCNC: 252 MG/DL — HIGH (ref 70–99)
GLUCOSE BLDC GLUCOMTR-MCNC: 336 MG/DL — HIGH (ref 70–99)
GLUCOSE BLDC GLUCOMTR-MCNC: 336 MG/DL — HIGH (ref 70–99)
GLUCOSE BLDC GLUCOMTR-MCNC: 364 MG/DL — HIGH (ref 70–99)
GLUCOSE BLDC GLUCOMTR-MCNC: 364 MG/DL — HIGH (ref 70–99)
GLUCOSE SERPL-MCNC: 303 MG/DL — HIGH (ref 70–99)
GLUCOSE SERPL-MCNC: 303 MG/DL — HIGH (ref 70–99)
HADV DNA SPEC QL NAA+PROBE: SIGNIFICANT CHANGE UP
HADV DNA SPEC QL NAA+PROBE: SIGNIFICANT CHANGE UP
HCOV 229E RNA SPEC QL NAA+PROBE: SIGNIFICANT CHANGE UP
HCOV 229E RNA SPEC QL NAA+PROBE: SIGNIFICANT CHANGE UP
HCOV HKU1 RNA SPEC QL NAA+PROBE: SIGNIFICANT CHANGE UP
HCOV HKU1 RNA SPEC QL NAA+PROBE: SIGNIFICANT CHANGE UP
HCOV NL63 RNA SPEC QL NAA+PROBE: SIGNIFICANT CHANGE UP
HCOV NL63 RNA SPEC QL NAA+PROBE: SIGNIFICANT CHANGE UP
HCOV OC43 RNA SPEC QL NAA+PROBE: SIGNIFICANT CHANGE UP
HCOV OC43 RNA SPEC QL NAA+PROBE: SIGNIFICANT CHANGE UP
HCT VFR BLD CALC: 31.3 % — LOW (ref 34.5–45)
HCT VFR BLD CALC: 31.3 % — LOW (ref 34.5–45)
HGB BLD-MCNC: 10.4 G/DL — LOW (ref 11.5–15.5)
HGB BLD-MCNC: 10.4 G/DL — LOW (ref 11.5–15.5)
HMPV RNA SPEC QL NAA+PROBE: SIGNIFICANT CHANGE UP
HMPV RNA SPEC QL NAA+PROBE: SIGNIFICANT CHANGE UP
HPIV1 RNA SPEC QL NAA+PROBE: SIGNIFICANT CHANGE UP
HPIV1 RNA SPEC QL NAA+PROBE: SIGNIFICANT CHANGE UP
HPIV2 RNA SPEC QL NAA+PROBE: SIGNIFICANT CHANGE UP
HPIV2 RNA SPEC QL NAA+PROBE: SIGNIFICANT CHANGE UP
HPIV3 RNA SPEC QL NAA+PROBE: SIGNIFICANT CHANGE UP
HPIV3 RNA SPEC QL NAA+PROBE: SIGNIFICANT CHANGE UP
HPIV4 RNA SPEC QL NAA+PROBE: SIGNIFICANT CHANGE UP
HPIV4 RNA SPEC QL NAA+PROBE: SIGNIFICANT CHANGE UP
M PNEUMO DNA SPEC QL NAA+PROBE: SIGNIFICANT CHANGE UP
M PNEUMO DNA SPEC QL NAA+PROBE: SIGNIFICANT CHANGE UP
MAGNESIUM SERPL-MCNC: 1.9 MG/DL — SIGNIFICANT CHANGE UP (ref 1.6–2.6)
MAGNESIUM SERPL-MCNC: 1.9 MG/DL — SIGNIFICANT CHANGE UP (ref 1.6–2.6)
MCHC RBC-ENTMCNC: 26.3 PG — LOW (ref 27–34)
MCHC RBC-ENTMCNC: 26.3 PG — LOW (ref 27–34)
MCHC RBC-ENTMCNC: 33.2 GM/DL — SIGNIFICANT CHANGE UP (ref 32–36)
MCHC RBC-ENTMCNC: 33.2 GM/DL — SIGNIFICANT CHANGE UP (ref 32–36)
MCV RBC AUTO: 79.2 FL — LOW (ref 80–100)
MCV RBC AUTO: 79.2 FL — LOW (ref 80–100)
N GONORRHOEA RRNA SPEC QL NAA+PROBE: SIGNIFICANT CHANGE UP
NRBC # BLD: 0 /100 WBCS — SIGNIFICANT CHANGE UP (ref 0–0)
NRBC # BLD: 0 /100 WBCS — SIGNIFICANT CHANGE UP (ref 0–0)
NRBC # FLD: 0 K/UL — SIGNIFICANT CHANGE UP (ref 0–0)
NRBC # FLD: 0 K/UL — SIGNIFICANT CHANGE UP (ref 0–0)
PHOSPHATE SERPL-MCNC: 1.6 MG/DL — LOW (ref 2.5–4.5)
PHOSPHATE SERPL-MCNC: 1.6 MG/DL — LOW (ref 2.5–4.5)
PLATELET # BLD AUTO: 194 K/UL — SIGNIFICANT CHANGE UP (ref 150–400)
PLATELET # BLD AUTO: 194 K/UL — SIGNIFICANT CHANGE UP (ref 150–400)
POTASSIUM SERPL-MCNC: 3.5 MMOL/L — SIGNIFICANT CHANGE UP (ref 3.5–5.3)
POTASSIUM SERPL-MCNC: 3.5 MMOL/L — SIGNIFICANT CHANGE UP (ref 3.5–5.3)
POTASSIUM SERPL-SCNC: 3.5 MMOL/L — SIGNIFICANT CHANGE UP (ref 3.5–5.3)
POTASSIUM SERPL-SCNC: 3.5 MMOL/L — SIGNIFICANT CHANGE UP (ref 3.5–5.3)
PROT SERPL-MCNC: 7.3 G/DL — SIGNIFICANT CHANGE UP (ref 6–8.3)
PROT SERPL-MCNC: 7.3 G/DL — SIGNIFICANT CHANGE UP (ref 6–8.3)
RAPID RVP RESULT: SIGNIFICANT CHANGE UP
RAPID RVP RESULT: SIGNIFICANT CHANGE UP
RBC # BLD: 3.95 M/UL — SIGNIFICANT CHANGE UP (ref 3.8–5.2)
RBC # BLD: 3.95 M/UL — SIGNIFICANT CHANGE UP (ref 3.8–5.2)
RBC # FLD: 14.5 % — SIGNIFICANT CHANGE UP (ref 10.3–14.5)
RBC # FLD: 14.5 % — SIGNIFICANT CHANGE UP (ref 10.3–14.5)
RSV RNA SPEC QL NAA+PROBE: SIGNIFICANT CHANGE UP
RSV RNA SPEC QL NAA+PROBE: SIGNIFICANT CHANGE UP
RV+EV RNA SPEC QL NAA+PROBE: SIGNIFICANT CHANGE UP
RV+EV RNA SPEC QL NAA+PROBE: SIGNIFICANT CHANGE UP
SARS-COV-2 RNA SPEC QL NAA+PROBE: SIGNIFICANT CHANGE UP
SARS-COV-2 RNA SPEC QL NAA+PROBE: SIGNIFICANT CHANGE UP
SODIUM SERPL-SCNC: 133 MMOL/L — LOW (ref 135–145)
SODIUM SERPL-SCNC: 133 MMOL/L — LOW (ref 135–145)
SPECIMEN SOURCE: SIGNIFICANT CHANGE UP
SPECIMEN SOURCE: SIGNIFICANT CHANGE UP
WBC # BLD: 14.88 K/UL — HIGH (ref 3.8–10.5)
WBC # BLD: 14.88 K/UL — HIGH (ref 3.8–10.5)
WBC # FLD AUTO: 14.88 K/UL — HIGH (ref 3.8–10.5)
WBC # FLD AUTO: 14.88 K/UL — HIGH (ref 3.8–10.5)

## 2023-10-31 PROCEDURE — 99232 SBSQ HOSP IP/OBS MODERATE 35: CPT | Mod: GC

## 2023-10-31 RX ORDER — INSULIN GLARGINE 100 [IU]/ML
6 INJECTION, SOLUTION SUBCUTANEOUS AT BEDTIME
Refills: 0 | Status: DISCONTINUED | OUTPATIENT
Start: 2023-10-31 | End: 2023-11-01

## 2023-10-31 RX ORDER — INSULIN LISPRO 100/ML
2 VIAL (ML) SUBCUTANEOUS
Refills: 0 | Status: DISCONTINUED | OUTPATIENT
Start: 2023-10-31 | End: 2023-11-01

## 2023-10-31 RX ORDER — POTASSIUM PHOSPHATE, MONOBASIC POTASSIUM PHOSPHATE, DIBASIC 236; 224 MG/ML; MG/ML
15 INJECTION, SOLUTION INTRAVENOUS ONCE
Refills: 0 | Status: COMPLETED | OUTPATIENT
Start: 2023-10-31 | End: 2023-10-31

## 2023-10-31 RX ORDER — LANOLIN ALCOHOL/MO/W.PET/CERES
3 CREAM (GRAM) TOPICAL AT BEDTIME
Refills: 0 | Status: DISCONTINUED | OUTPATIENT
Start: 2023-10-31 | End: 2023-11-02

## 2023-10-31 RX ADMIN — POTASSIUM PHOSPHATE, MONOBASIC POTASSIUM PHOSPHATE, DIBASIC 62.5 MILLIMOLE(S): 236; 224 INJECTION, SOLUTION INTRAVENOUS at 11:20

## 2023-10-31 RX ADMIN — Medication 3 MILLIGRAM(S): at 21:28

## 2023-10-31 RX ADMIN — ENOXAPARIN SODIUM 40 MILLIGRAM(S): 100 INJECTION SUBCUTANEOUS at 17:50

## 2023-10-31 RX ADMIN — CEFEPIME 100 MILLIGRAM(S): 1 INJECTION, POWDER, FOR SOLUTION INTRAMUSCULAR; INTRAVENOUS at 05:31

## 2023-10-31 RX ADMIN — Medication 100 MILLIGRAM(S): at 14:19

## 2023-10-31 RX ADMIN — Medication 4: at 09:12

## 2023-10-31 RX ADMIN — Medication 2 UNIT(S): at 13:19

## 2023-10-31 RX ADMIN — Medication 5: at 13:19

## 2023-10-31 RX ADMIN — INSULIN GLARGINE 6 UNIT(S): 100 INJECTION, SOLUTION SUBCUTANEOUS at 21:26

## 2023-10-31 RX ADMIN — Medication 3: at 17:49

## 2023-10-31 RX ADMIN — Medication 100 MILLIGRAM(S): at 21:27

## 2023-10-31 RX ADMIN — Medication 2 UNIT(S): at 17:48

## 2023-10-31 NOTE — PROGRESS NOTE ADULT - PROBLEM SELECTOR PLAN 1
- P/w 1wk h/o fever, chills, arthralgias, fatigue  - Of note, diffuse palpable purpura across chest, back, b/l LEs on exam, not pruritic or painful  - In ED, SIRS criteria w/ tachycardia, hypotension, leukocytosis, elevated lactate  - Lactate downtrending, CTM  - Elevated ESR, CRP  - RVP negative  - S/p 1x dose vancomycin, cefepime in ED  - S/p 3L NS  - S/p vancomycin iso negative MRSA  - Blood cx NGTD; urine cx normal urogenital keron  - Urine chlamydia, gonococcal negative  - CXR (10/30) -> Unremarkable  - C/w cefepime  - Dermatology consulted, obtained skin biopsy x2, recommend RVP, parvovirus, EBV, CMV tests, currently pending

## 2023-10-31 NOTE — PROGRESS NOTE ADULT - SUBJECTIVE AND OBJECTIVE BOX
PROGRESS NOTE:     Patient is a 63y old  Female who presents with a chief complaint of Sepsis of unclear etiology (30 Oct 2023 18:02)      SUBJECTIVE / OVERNIGHT EVENTS:    OVERNIGHT: No acute overnight events.      Patient was examined at bedside and feels better this morning. Continues to endorse fatigue but notes improvement in arthralgias. Denies fever, chills, chest pain, SOB, nausea, vomiting. ROS otherwise negative and pt is amenable to current treatment plan.      REVIEW OF SYSTEMS:    CONSTITUTIONAL:  Weakness, no fevers or chills  EYES/ENT:  No visual changes, vertigo, or throat pain   NECK:  No pain or stiffness  RESPIRATORY:  No SOB, cough, wheezing, hemoptysis  CARDIOVASCULAR:  No chest pain or palpitations  GASTROINTESTINAL:  No abdominal pain, nausea, vomiting, or hematemesis; No diarrhea or constipation. No melena or hematochezia.  GENITOURINARY:  No dysuria, change in frequency, or hematuria  NEUROLOGICAL:  No numbness or weakness  SKIN:  Diffuse skin lesions of b/l LEs, chest, back      MEDICATIONS  (STANDING):  cefepime   IVPB 2000 milliGRAM(s) IV Intermittent every 12 hours  dextrose 5%. 1000 milliLiter(s) (100 mL/Hr) IV Continuous <Continuous>  dextrose 5%. 1000 milliLiter(s) (50 mL/Hr) IV Continuous <Continuous>  dextrose 50% Injectable 25 Gram(s) IV Push once  dextrose 50% Injectable 25 Gram(s) IV Push once  dextrose 50% Injectable 12.5 Gram(s) IV Push once  enoxaparin Injectable 40 milliGRAM(s) SubCutaneous every 24 hours  glucagon  Injectable 1 milliGRAM(s) IntraMuscular once  insulin glargine Injectable (LANTUS) 6 Unit(s) SubCutaneous at bedtime  insulin lispro (ADMELOG) corrective regimen sliding scale   SubCutaneous three times a day before meals  insulin lispro (ADMELOG) corrective regimen sliding scale   SubCutaneous at bedtime  insulin lispro Injectable (ADMELOG) 2 Unit(s) SubCutaneous three times a day before meals  melatonin 3 milliGRAM(s) Oral at bedtime    MEDICATIONS  (PRN):  acetaminophen     Tablet .. 650 milliGRAM(s) Oral every 6 hours PRN Temp greater or equal to 38C (100.4F), Mild Pain (1 - 3)  benzonatate 100 milliGRAM(s) Oral three times a day PRN Cough  dextrose Oral Gel 15 Gram(s) Oral once PRN Blood Glucose LESS THAN 70 milliGRAM(s)/deciliter  guaiFENesin Oral Liquid (Sugar-Free) 100 milliGRAM(s) Oral every 6 hours PRN Cough      CAPILLARY BLOOD GLUCOSE      POCT Blood Glucose.: 364 mg/dL (31 Oct 2023 12:40)  POCT Blood Glucose.: 336 mg/dL (31 Oct 2023 09:01)  POCT Blood Glucose.: 350 mg/dL (30 Oct 2023 21:40)  POCT Blood Glucose.: 371 mg/dL (30 Oct 2023 21:39)  POCT Blood Glucose.: 355 mg/dL (30 Oct 2023 17:26)  POCT Blood Glucose.: 395 mg/dL (30 Oct 2023 17:25)  POCT Blood Glucose.: 267 mg/dL (30 Oct 2023 13:22)    I&O's Summary      PHYSICAL EXAM:  Vital Signs Last 24 Hrs  T(C): 37.1 (31 Oct 2023 11:28), Max: 37.8 (31 Oct 2023 05:25)  T(F): 98.8 (31 Oct 2023 11:28), Max: 100 (31 Oct 2023 05:25)  HR: 108 (31 Oct 2023 11:28) (104 - 109)  BP: 132/77 (31 Oct 2023 11:28) (128/63 - 148/68)  BP(mean): --  RR: 18 (31 Oct 2023 11:28) (16 - 18)  SpO2: 100% (31 Oct 2023 11:28) (98% - 100%)    Parameters below as of 31 Oct 2023 11:28  Patient On (Oxygen Delivery Method): room air        CONSTITUTIONAL: NAD; well-developed  HEENT: PERRL, clear conjunctiva  RESPIRATORY: Normal respiratory effort; lungs are clear to auscultation bilaterally; No Crackles/Rhonchi/Wheezing  CARDIOVASCULAR: Regular rate and rhythm, normal S1 and S2, no murmur/rub/gallop; No lower extremity edema; Peripheral pulses are 2+ bilaterally  ABDOMEN: Nontender to palpation, normoactive bowel sounds, no rebound/guarding; No hepatosplenomegaly  MUSCULOSKELETAL: no clubbing or cyanosis of digits; no joint swelling or tenderness to palpation  EXTREMITY: Diffuse palpable purpura of b/l LEs, chest, back  NEURO: A&Ox3; no focal deficits   PSYCH: normal mood; Affect appropriate    LABS:                        10.4   14.88 )-----------( 194      ( 31 Oct 2023 06:51 )             31.3     10-31    133<L>  |  101  |  8   ----------------------------<  303<H>  3.5   |  19<L>  |  0.69    Ca    8.5      31 Oct 2023 06:51  Phos  1.6     10-31  Mg     1.90     10-31    TPro  7.3  /  Alb  3.3  /  TBili  0.5  /  DBili  x   /  AST  49<H>  /  ALT  120<H>  /  AlkPhos  106  10-31          Urinalysis Basic - ( 31 Oct 2023 06:51 )    Color: x / Appearance: x / SG: x / pH: x  Gluc: 303 mg/dL / Ketone: x  / Bili: x / Urobili: x   Blood: x / Protein: x / Nitrite: x   Leuk Esterase: x / RBC: x / WBC x   Sq Epi: x / Non Sq Epi: x / Bacteria: x        Culture - Urine (collected 29 Oct 2023 16:00)  Source: Clean Catch Clean Catch (Midstream)  Final Report (30 Oct 2023 11:26):    <10,000 CFU/mL Normal Urogenital Ale    Culture - Blood (collected 29 Oct 2023 15:21)  Source: .Blood Blood-Peripheral  Preliminary Report (30 Oct 2023 19:01):    No growth at 24 hours    Culture - Blood (collected 29 Oct 2023 15:10)  Source: .Blood Blood-Peripheral  Preliminary Report (30 Oct 2023 19:01):    No growth at 24 hours        RADIOLOGY & ADDITIONAL TESTS:    CXR (10/30):    IMPRESSION:  Clear lungs.  Small sclerotic focus in the left humeral head that is similar to prior   study.

## 2023-11-01 ENCOUNTER — TRANSCRIPTION ENCOUNTER (OUTPATIENT)
Age: 63
End: 2023-11-01

## 2023-11-01 LAB
ALBUMIN SERPL ELPH-MCNC: 3.1 G/DL — LOW (ref 3.3–5)
ALBUMIN SERPL ELPH-MCNC: 3.1 G/DL — LOW (ref 3.3–5)
ALP SERPL-CCNC: 121 U/L — HIGH (ref 40–120)
ALP SERPL-CCNC: 121 U/L — HIGH (ref 40–120)
ALT FLD-CCNC: 101 U/L — HIGH (ref 4–33)
ALT FLD-CCNC: 101 U/L — HIGH (ref 4–33)
ANION GAP SERPL CALC-SCNC: 14 MMOL/L — SIGNIFICANT CHANGE UP (ref 7–14)
ANION GAP SERPL CALC-SCNC: 14 MMOL/L — SIGNIFICANT CHANGE UP (ref 7–14)
AST SERPL-CCNC: 34 U/L — HIGH (ref 4–32)
AST SERPL-CCNC: 34 U/L — HIGH (ref 4–32)
BILIRUB SERPL-MCNC: 0.4 MG/DL — SIGNIFICANT CHANGE UP (ref 0.2–1.2)
BILIRUB SERPL-MCNC: 0.4 MG/DL — SIGNIFICANT CHANGE UP (ref 0.2–1.2)
BUN SERPL-MCNC: 9 MG/DL — SIGNIFICANT CHANGE UP (ref 7–23)
BUN SERPL-MCNC: 9 MG/DL — SIGNIFICANT CHANGE UP (ref 7–23)
CALCIUM SERPL-MCNC: 8.7 MG/DL — SIGNIFICANT CHANGE UP (ref 8.4–10.5)
CALCIUM SERPL-MCNC: 8.7 MG/DL — SIGNIFICANT CHANGE UP (ref 8.4–10.5)
CHLORIDE SERPL-SCNC: 98 MMOL/L — SIGNIFICANT CHANGE UP (ref 98–107)
CHLORIDE SERPL-SCNC: 98 MMOL/L — SIGNIFICANT CHANGE UP (ref 98–107)
CMV DNA CSF QL NAA+PROBE: SIGNIFICANT CHANGE UP IU/ML
CMV DNA CSF QL NAA+PROBE: SIGNIFICANT CHANGE UP IU/ML
CMV DNA SPEC NAA+PROBE-LOG#: SIGNIFICANT CHANGE UP LOG10IU/ML
CMV DNA SPEC NAA+PROBE-LOG#: SIGNIFICANT CHANGE UP LOG10IU/ML
CO2 SERPL-SCNC: 22 MMOL/L — SIGNIFICANT CHANGE UP (ref 22–31)
CO2 SERPL-SCNC: 22 MMOL/L — SIGNIFICANT CHANGE UP (ref 22–31)
CREAT SERPL-MCNC: 0.59 MG/DL — SIGNIFICANT CHANGE UP (ref 0.5–1.3)
CREAT SERPL-MCNC: 0.59 MG/DL — SIGNIFICANT CHANGE UP (ref 0.5–1.3)
EBV EA AB SER IA-ACNC: <5 U/ML — SIGNIFICANT CHANGE UP
EBV EA AB SER IA-ACNC: <5 U/ML — SIGNIFICANT CHANGE UP
EBV EA AB TITR SER IF: POSITIVE
EBV EA AB TITR SER IF: POSITIVE
EBV EA IGG SER-ACNC: NEGATIVE — SIGNIFICANT CHANGE UP
EBV EA IGG SER-ACNC: NEGATIVE — SIGNIFICANT CHANGE UP
EBV NA IGG SER IA-ACNC: >600 U/ML — HIGH
EBV NA IGG SER IA-ACNC: >600 U/ML — HIGH
EBV PATRN SPEC IB-IMP: SIGNIFICANT CHANGE UP
EBV PATRN SPEC IB-IMP: SIGNIFICANT CHANGE UP
EBV VCA IGG AVIDITY SER QL IA: POSITIVE
EBV VCA IGG AVIDITY SER QL IA: POSITIVE
EBV VCA IGM SER IA-ACNC: <10 U/ML — SIGNIFICANT CHANGE UP
EBV VCA IGM SER IA-ACNC: <10 U/ML — SIGNIFICANT CHANGE UP
EBV VCA IGM SER IA-ACNC: >750 U/ML — HIGH
EBV VCA IGM SER IA-ACNC: >750 U/ML — HIGH
EBV VCA IGM TITR FLD: NEGATIVE — SIGNIFICANT CHANGE UP
EBV VCA IGM TITR FLD: NEGATIVE — SIGNIFICANT CHANGE UP
EGFR: 102 ML/MIN/1.73M2 — SIGNIFICANT CHANGE UP
EGFR: 102 ML/MIN/1.73M2 — SIGNIFICANT CHANGE UP
GLUCOSE BLDC GLUCOMTR-MCNC: 216 MG/DL — HIGH (ref 70–99)
GLUCOSE BLDC GLUCOMTR-MCNC: 216 MG/DL — HIGH (ref 70–99)
GLUCOSE BLDC GLUCOMTR-MCNC: 258 MG/DL — HIGH (ref 70–99)
GLUCOSE BLDC GLUCOMTR-MCNC: 258 MG/DL — HIGH (ref 70–99)
GLUCOSE BLDC GLUCOMTR-MCNC: 301 MG/DL — HIGH (ref 70–99)
GLUCOSE BLDC GLUCOMTR-MCNC: 301 MG/DL — HIGH (ref 70–99)
GLUCOSE BLDC GLUCOMTR-MCNC: 322 MG/DL — HIGH (ref 70–99)
GLUCOSE BLDC GLUCOMTR-MCNC: 322 MG/DL — HIGH (ref 70–99)
GLUCOSE SERPL-MCNC: 257 MG/DL — HIGH (ref 70–99)
GLUCOSE SERPL-MCNC: 257 MG/DL — HIGH (ref 70–99)
HCT VFR BLD CALC: 31.9 % — LOW (ref 34.5–45)
HCT VFR BLD CALC: 31.9 % — LOW (ref 34.5–45)
HGB BLD-MCNC: 10.5 G/DL — LOW (ref 11.5–15.5)
HGB BLD-MCNC: 10.5 G/DL — LOW (ref 11.5–15.5)
MAGNESIUM SERPL-MCNC: 2 MG/DL — SIGNIFICANT CHANGE UP (ref 1.6–2.6)
MAGNESIUM SERPL-MCNC: 2 MG/DL — SIGNIFICANT CHANGE UP (ref 1.6–2.6)
MCHC RBC-ENTMCNC: 25.9 PG — LOW (ref 27–34)
MCHC RBC-ENTMCNC: 25.9 PG — LOW (ref 27–34)
MCHC RBC-ENTMCNC: 32.9 GM/DL — SIGNIFICANT CHANGE UP (ref 32–36)
MCHC RBC-ENTMCNC: 32.9 GM/DL — SIGNIFICANT CHANGE UP (ref 32–36)
MCV RBC AUTO: 78.8 FL — LOW (ref 80–100)
MCV RBC AUTO: 78.8 FL — LOW (ref 80–100)
NRBC # BLD: 0 /100 WBCS — SIGNIFICANT CHANGE UP (ref 0–0)
NRBC # BLD: 0 /100 WBCS — SIGNIFICANT CHANGE UP (ref 0–0)
NRBC # FLD: 0 K/UL — SIGNIFICANT CHANGE UP (ref 0–0)
NRBC # FLD: 0 K/UL — SIGNIFICANT CHANGE UP (ref 0–0)
PHOSPHATE SERPL-MCNC: 2.4 MG/DL — LOW (ref 2.5–4.5)
PHOSPHATE SERPL-MCNC: 2.4 MG/DL — LOW (ref 2.5–4.5)
PLATELET # BLD AUTO: 243 K/UL — SIGNIFICANT CHANGE UP (ref 150–400)
PLATELET # BLD AUTO: 243 K/UL — SIGNIFICANT CHANGE UP (ref 150–400)
POTASSIUM SERPL-MCNC: 3.6 MMOL/L — SIGNIFICANT CHANGE UP (ref 3.5–5.3)
POTASSIUM SERPL-MCNC: 3.6 MMOL/L — SIGNIFICANT CHANGE UP (ref 3.5–5.3)
POTASSIUM SERPL-SCNC: 3.6 MMOL/L — SIGNIFICANT CHANGE UP (ref 3.5–5.3)
POTASSIUM SERPL-SCNC: 3.6 MMOL/L — SIGNIFICANT CHANGE UP (ref 3.5–5.3)
PROT SERPL-MCNC: 7.1 G/DL — SIGNIFICANT CHANGE UP (ref 6–8.3)
PROT SERPL-MCNC: 7.1 G/DL — SIGNIFICANT CHANGE UP (ref 6–8.3)
RBC # BLD: 4.05 M/UL — SIGNIFICANT CHANGE UP (ref 3.8–5.2)
RBC # BLD: 4.05 M/UL — SIGNIFICANT CHANGE UP (ref 3.8–5.2)
RBC # FLD: 14.5 % — SIGNIFICANT CHANGE UP (ref 10.3–14.5)
RBC # FLD: 14.5 % — SIGNIFICANT CHANGE UP (ref 10.3–14.5)
SODIUM SERPL-SCNC: 134 MMOL/L — LOW (ref 135–145)
SODIUM SERPL-SCNC: 134 MMOL/L — LOW (ref 135–145)
WBC # BLD: 10.71 K/UL — HIGH (ref 3.8–10.5)
WBC # BLD: 10.71 K/UL — HIGH (ref 3.8–10.5)
WBC # FLD AUTO: 10.71 K/UL — HIGH (ref 3.8–10.5)
WBC # FLD AUTO: 10.71 K/UL — HIGH (ref 3.8–10.5)

## 2023-11-01 PROCEDURE — 99232 SBSQ HOSP IP/OBS MODERATE 35: CPT | Mod: GC

## 2023-11-01 RX ORDER — SODIUM,POTASSIUM PHOSPHATES 278-250MG
1 POWDER IN PACKET (EA) ORAL ONCE
Refills: 0 | Status: COMPLETED | OUTPATIENT
Start: 2023-11-01 | End: 2023-11-01

## 2023-11-01 RX ORDER — INSULIN LISPRO 100/ML
4 VIAL (ML) SUBCUTANEOUS
Refills: 0 | Status: DISCONTINUED | OUTPATIENT
Start: 2023-11-01 | End: 2023-11-02

## 2023-11-01 RX ORDER — FLUTICASONE PROPIONATE 50 MCG
1 SPRAY, SUSPENSION NASAL ONCE
Refills: 0 | Status: DISCONTINUED | OUTPATIENT
Start: 2023-11-01 | End: 2023-11-02

## 2023-11-01 RX ORDER — INSULIN GLARGINE 100 [IU]/ML
12 INJECTION, SOLUTION SUBCUTANEOUS AT BEDTIME
Refills: 0 | Status: DISCONTINUED | OUTPATIENT
Start: 2023-11-01 | End: 2023-11-02

## 2023-11-01 RX ADMIN — Medication 4 UNIT(S): at 17:39

## 2023-11-01 RX ADMIN — Medication 4: at 17:39

## 2023-11-01 RX ADMIN — Medication 4: at 13:05

## 2023-11-01 RX ADMIN — Medication 100 MILLIGRAM(S): at 22:09

## 2023-11-01 RX ADMIN — Medication 650 MILLIGRAM(S): at 16:24

## 2023-11-01 RX ADMIN — Medication 2 UNIT(S): at 09:26

## 2023-11-01 RX ADMIN — Medication 3: at 09:26

## 2023-11-01 RX ADMIN — Medication 3 MILLIGRAM(S): at 22:10

## 2023-11-01 RX ADMIN — Medication 4 UNIT(S): at 13:05

## 2023-11-01 RX ADMIN — ENOXAPARIN SODIUM 40 MILLIGRAM(S): 100 INJECTION SUBCUTANEOUS at 19:53

## 2023-11-01 RX ADMIN — INSULIN GLARGINE 12 UNIT(S): 100 INJECTION, SOLUTION SUBCUTANEOUS at 22:06

## 2023-11-01 RX ADMIN — Medication 1 PACKET(S): at 08:53

## 2023-11-01 NOTE — PROGRESS NOTE ADULT - PROBLEM SELECTOR PLAN 1
- P/w 1wk h/o fever, chills, arthralgias, fatigue  - Of note, diffuse palpable purpura across chest, back, b/l LEs on exam, not pruritic or painful  - In ED, SIRS criteria w/ tachycardia, hypotension, leukocytosis, elevated lactate  - Lactate downtrending, CTM  - Elevated ESR, CRP  - RVP negative  - S/p 1x dose vancomycin, cefepime in ED  - S/p 3L NS  - S/p vancomycin iso negative MRSA  - Blood cx NGTD; urine cx normal urogenital keron  - Urine chlamydia, gonococcal negative  - CXR (10/30) -> Unremarkable  - C/w cefepime  - Dermatology consulted, obtained skin biopsy x2, recommend parvovirus, EBV, CMV tests, currently pending

## 2023-11-01 NOTE — DISCHARGE NOTE PROVIDER - NSDCCPCAREPLAN_GEN_ALL_CORE_FT
PRINCIPAL DISCHARGE DIAGNOSIS  Diagnosis: Sepsis, unspecified organism  Assessment and Plan of Treatment: You came to the hospital with fevers and rash for 1-2 days and received intravenous antibiotics. The dermatology (skin) team saw you during your stay and took samples (biopsies) of your skin. The biopsy revealed ______. You were also monitored off antibiotics shortly after your infectious work up came back negatibe.   Please follow up with the dermatology team in 2 weeks to reasses your rash.   Please return to the ED if you experience fever, nausea, vomiting, chest pain, blurry vision or loss of consciousness.     PRINCIPAL DISCHARGE DIAGNOSIS  Diagnosis: Sepsis, unspecified organism  Assessment and Plan of Treatment: You came to the hospital with fevers and rash for 1-2 days and received intravenous antibiotics. The dermatology (skin) team saw you during your stay and took samples (biopsies) of your skin. The biopsy revealed nonspecific hive reaction most possibly allergy related in the setting of viral infection, otherwise was negative for connective tissue and autoimmune disorder. You were also monitored off antibiotics shortly after your infectious work up came back negatibe.   Please follow up with the dermatology team in 2 weeks to reasses your rash.   Please return to the ED if you experience fever, nausea, vomiting, chest pain, blurry vision or loss of consciousness.      SECONDARY DISCHARGE DIAGNOSES  Diagnosis: Transaminitis  Assessment and Plan of Treatment: Your statin therapy was held due to elevated liver enzymes. Please follow up with PCP for repeat blood work and discuss reinitiation of statin therapy.    Diagnosis: HTN (hypertension)  Assessment and Plan of Treatment: Continue Altace.    Diagnosis: DM2 (diabetes mellitus, type 2)  Assessment and Plan of Treatment: Low salt, fat and carbohydrate diet, minimize glucose intake.  Exercise daily for at least 30 minutes and weight loss.  Follow up with primary care physician and endocrinologist for routine Hemoglobin A1C checks and management.  Follow up with your ophthalmologist for routine yearly vision exams.

## 2023-11-01 NOTE — DISCHARGE NOTE PROVIDER - PROVIDER TOKENS
PROVIDER:[TOKEN:[[id number]]] PROVIDER:[TOKEN:[[id number] PROVIDER:[TOKEN:[[id number]]]

## 2023-11-01 NOTE — PROGRESS NOTE ADULT - SUBJECTIVE AND OBJECTIVE BOX
PROGRESS NOTE:     Patient is a 63y old  Female who presents with a chief complaint of Sepsis of unclear etiology (31 Oct 2023 13:15)      SUBJECTIVE / OVERNIGHT EVENTS:    OVERNIGHT: No acute overnight events.      Patient was examined at bedside and feels well this morning. Denies fever, chills, chest pain, SOB, nausea, vomiting. ROS otherwise negative and pt is amenable to current treatment plan.      REVIEW OF SYSTEMS:    CONSTITUTIONAL:  No weakness, fevers, or chills  EYES/ENT:  No visual changes, vertigo, or throat pain   NECK:  No pain or stiffness  RESPIRATORY:  No SOB, cough, wheezing, hemoptysis  CARDIOVASCULAR:  No chest pain or palpitations  GASTROINTESTINAL:  No abdominal pain, nausea, vomiting, or hematemesis; No diarrhea or constipation. No melena or hematochezia.  GENITOURINARY:  No dysuria, change in frequency, or hematuria  NEUROLOGICAL:  No numbness or weakness  SKIN:  No itching or rashes      MEDICATIONS  (STANDING):  dextrose 5%. 1000 milliLiter(s) (100 mL/Hr) IV Continuous <Continuous>  dextrose 5%. 1000 milliLiter(s) (50 mL/Hr) IV Continuous <Continuous>  dextrose 50% Injectable 25 Gram(s) IV Push once  dextrose 50% Injectable 12.5 Gram(s) IV Push once  dextrose 50% Injectable 25 Gram(s) IV Push once  enoxaparin Injectable 40 milliGRAM(s) SubCutaneous every 24 hours  glucagon  Injectable 1 milliGRAM(s) IntraMuscular once  insulin glargine Injectable (LANTUS) 6 Unit(s) SubCutaneous at bedtime  insulin lispro (ADMELOG) corrective regimen sliding scale   SubCutaneous three times a day before meals  insulin lispro (ADMELOG) corrective regimen sliding scale   SubCutaneous at bedtime  insulin lispro Injectable (ADMELOG) 2 Unit(s) SubCutaneous three times a day before meals  melatonin 3 milliGRAM(s) Oral at bedtime    MEDICATIONS  (PRN):  acetaminophen     Tablet .. 650 milliGRAM(s) Oral every 6 hours PRN Temp greater or equal to 38C (100.4F), Mild Pain (1 - 3)  benzonatate 100 milliGRAM(s) Oral three times a day PRN Cough  dextrose Oral Gel 15 Gram(s) Oral once PRN Blood Glucose LESS THAN 70 milliGRAM(s)/deciliter  guaiFENesin Oral Liquid (Sugar-Free) 100 milliGRAM(s) Oral every 6 hours PRN Cough      CAPILLARY BLOOD GLUCOSE      POCT Blood Glucose.: 258 mg/dL (01 Nov 2023 08:35)  POCT Blood Glucose.: 230 mg/dL (31 Oct 2023 21:04)  POCT Blood Glucose.: 252 mg/dL (31 Oct 2023 17:23)  POCT Blood Glucose.: 364 mg/dL (31 Oct 2023 12:40)    I&O's Summary      PHYSICAL EXAM:  Vital Signs Last 24 Hrs  T(C): 36.8 (01 Nov 2023 05:35), Max: 37.6 (31 Oct 2023 18:00)  T(F): 98.3 (01 Nov 2023 05:35), Max: 99.6 (31 Oct 2023 18:00)  HR: 100 (01 Nov 2023 05:35) (98 - 108)  BP: 139/80 (01 Nov 2023 05:35) (132/77 - 160/84)  BP(mean): --  RR: 18 (01 Nov 2023 05:35) (18 - 18)  SpO2: 98% (01 Nov 2023 05:35) (98% - 100%)    Parameters below as of 01 Nov 2023 05:35  Patient On (Oxygen Delivery Method): room air        CONSTITUTIONAL: NAD; well-developed  HEENT: PERRL, clear conjunctiva  RESPIRATORY: Normal respiratory effort; lungs are clear to auscultation bilaterally; No Crackles/Rhonchi/Wheezing  CARDIOVASCULAR: Regular rate and rhythm, normal S1 and S2, no murmur/rub/gallop; No lower extremity edema; Peripheral pulses are 2+ bilaterally  ABDOMEN: Nontender to palpation, normoactive bowel sounds, no rebound/guarding; No hepatosplenomegaly  MUSCULOSKELETAL: no clubbing or cyanosis of digits; no joint swelling or tenderness to palpation  EXTREMITY: Lower extremities Non-tender to palpation; non-erythematous B/L  NEURO: A&Ox3; no focal deficits   PSYCH: normal mood; Affect appropriate    LABS:                        10.5   10.71 )-----------( 243      ( 01 Nov 2023 06:30 )             31.9     11-01    134<L>  |  98  |  9   ----------------------------<  257<H>  3.6   |  22  |  0.59    Ca    8.7      01 Nov 2023 06:30  Phos  2.4     11-01  Mg     2.00     11-01    TPro  7.1  /  Alb  3.1<L>  /  TBili  0.4  /  DBili  x   /  AST  34<H>  /  ALT  101<H>  /  AlkPhos  121<H>  11-01          Urinalysis Basic - ( 01 Nov 2023 06:30 )    Color: x / Appearance: x / SG: x / pH: x  Gluc: 257 mg/dL / Ketone: x  / Bili: x / Urobili: x   Blood: x / Protein: x / Nitrite: x   Leuk Esterase: x / RBC: x / WBC x   Sq Epi: x / Non Sq Epi: x / Bacteria: x        Culture - Urine (collected 29 Oct 2023 16:00)  Source: Clean Catch Clean Catch (Midstream)  Final Report (30 Oct 2023 11:26):    <10,000 CFU/mL Normal Urogenital Ale    Culture - Blood (collected 29 Oct 2023 15:21)  Source: .Blood Blood-Peripheral  Preliminary Report (31 Oct 2023 19:01):    No growth at 48 Hours    Culture - Blood (collected 29 Oct 2023 15:10)  Source: .Blood Blood-Peripheral  Preliminary Report (31 Oct 2023 19:01):    No growth at 48 Hours        RADIOLOGY & ADDITIONAL TESTS:    N/A PROGRESS NOTE:     Patient is a 63y old  Female who presents with a chief complaint of Sepsis of unclear etiology (31 Oct 2023 13:15)      SUBJECTIVE / OVERNIGHT EVENTS:    OVERNIGHT: No acute overnight events.      Patient was examined at bedside and feels better this morning. Endorses fatigue but continues to note improvement in arthralgias. Denies fever, chills, chest pain, SOB, nausea, vomiting. ROS otherwise negative and pt is amenable to current treatment plan.      REVIEW OF SYSTEMS:    CONSTITUTIONAL:  Weakness, no fevers or chills  EYES/ENT:  No visual changes, vertigo, or throat pain   NECK:  No pain or stiffness  RESPIRATORY:  No SOB, cough, wheezing, hemoptysis  CARDIOVASCULAR:  No chest pain or palpitations  GASTROINTESTINAL:  No abdominal pain, nausea, vomiting, or hematemesis; No diarrhea or constipation. No melena or hematochezia.  GENITOURINARY:  No dysuria, change in frequency, or hematuria  NEUROLOGICAL:  No numbness or weakness  SKIN:  Diffuse skin lesions of b/l LEs, chest, back      MEDICATIONS  (STANDING):  dextrose 5%. 1000 milliLiter(s) (100 mL/Hr) IV Continuous <Continuous>  dextrose 5%. 1000 milliLiter(s) (50 mL/Hr) IV Continuous <Continuous>  dextrose 50% Injectable 25 Gram(s) IV Push once  dextrose 50% Injectable 12.5 Gram(s) IV Push once  dextrose 50% Injectable 25 Gram(s) IV Push once  enoxaparin Injectable 40 milliGRAM(s) SubCutaneous every 24 hours  glucagon  Injectable 1 milliGRAM(s) IntraMuscular once  insulin glargine Injectable (LANTUS) 6 Unit(s) SubCutaneous at bedtime  insulin lispro (ADMELOG) corrective regimen sliding scale   SubCutaneous three times a day before meals  insulin lispro (ADMELOG) corrective regimen sliding scale   SubCutaneous at bedtime  insulin lispro Injectable (ADMELOG) 2 Unit(s) SubCutaneous three times a day before meals  melatonin 3 milliGRAM(s) Oral at bedtime    MEDICATIONS  (PRN):  acetaminophen     Tablet .. 650 milliGRAM(s) Oral every 6 hours PRN Temp greater or equal to 38C (100.4F), Mild Pain (1 - 3)  benzonatate 100 milliGRAM(s) Oral three times a day PRN Cough  dextrose Oral Gel 15 Gram(s) Oral once PRN Blood Glucose LESS THAN 70 milliGRAM(s)/deciliter  guaiFENesin Oral Liquid (Sugar-Free) 100 milliGRAM(s) Oral every 6 hours PRN Cough      CAPILLARY BLOOD GLUCOSE      POCT Blood Glucose.: 258 mg/dL (01 Nov 2023 08:35)  POCT Blood Glucose.: 230 mg/dL (31 Oct 2023 21:04)  POCT Blood Glucose.: 252 mg/dL (31 Oct 2023 17:23)  POCT Blood Glucose.: 364 mg/dL (31 Oct 2023 12:40)    I&O's Summary      PHYSICAL EXAM:  Vital Signs Last 24 Hrs  T(C): 36.8 (01 Nov 2023 05:35), Max: 37.6 (31 Oct 2023 18:00)  T(F): 98.3 (01 Nov 2023 05:35), Max: 99.6 (31 Oct 2023 18:00)  HR: 100 (01 Nov 2023 05:35) (98 - 108)  BP: 139/80 (01 Nov 2023 05:35) (132/77 - 160/84)  BP(mean): --  RR: 18 (01 Nov 2023 05:35) (18 - 18)  SpO2: 98% (01 Nov 2023 05:35) (98% - 100%)    Parameters below as of 01 Nov 2023 05:35  Patient On (Oxygen Delivery Method): room air        CONSTITUTIONAL: NAD; well-developed  HEENT: PERRL, clear conjunctiva  RESPIRATORY: Normal respiratory effort; lungs are clear to auscultation bilaterally; No Crackles/Rhonchi/Wheezing  CARDIOVASCULAR: Regular rate and rhythm, normal S1 and S2, no murmur/rub/gallop; No lower extremity edema; Peripheral pulses are 2+ bilaterally  ABDOMEN: Nontender to palpation, normoactive bowel sounds, no rebound/guarding; No hepatosplenomegaly  MUSCULOSKELETAL: no clubbing or cyanosis of digits; no joint swelling or tenderness to palpation  EXTREMITY: Diffuse palpable purpura of b/l LEs, chest, back  NEURO: A&Ox3; no focal deficits   PSYCH: normal mood; Affect appropriate    LABS:                        10.5   10.71 )-----------( 243      ( 01 Nov 2023 06:30 )             31.9     11-01    134<L>  |  98  |  9   ----------------------------<  257<H>  3.6   |  22  |  0.59    Ca    8.7      01 Nov 2023 06:30  Phos  2.4     11-01  Mg     2.00     11-01    TPro  7.1  /  Alb  3.1<L>  /  TBili  0.4  /  DBili  x   /  AST  34<H>  /  ALT  101<H>  /  AlkPhos  121<H>  11-01          Urinalysis Basic - ( 01 Nov 2023 06:30 )    Color: x / Appearance: x / SG: x / pH: x  Gluc: 257 mg/dL / Ketone: x  / Bili: x / Urobili: x   Blood: x / Protein: x / Nitrite: x   Leuk Esterase: x / RBC: x / WBC x   Sq Epi: x / Non Sq Epi: x / Bacteria: x        Culture - Urine (collected 29 Oct 2023 16:00)  Source: Clean Catch Clean Catch (Midstream)  Final Report (30 Oct 2023 11:26):    <10,000 CFU/mL Normal Urogenital Ale    Culture - Blood (collected 29 Oct 2023 15:21)  Source: .Blood Blood-Peripheral  Preliminary Report (31 Oct 2023 19:01):    No growth at 48 Hours    Culture - Blood (collected 29 Oct 2023 15:10)  Source: .Blood Blood-Peripheral  Preliminary Report (31 Oct 2023 19:01):    No growth at 48 Hours        RADIOLOGY & ADDITIONAL TESTS:    CXR (10/30):    IMPRESSION:  Clear lungs.  Small sclerotic focus in the left humeral head that is similar to prior   study.

## 2023-11-01 NOTE — DISCHARGE NOTE PROVIDER - NSDCCAREPROVSEEN_GEN_ALL_CORE_FT
Bhavana Kinney Bhavana Kinney, [facility] Bhavana Kinney, [facility] Bhavana Kinney, [facility]

## 2023-11-01 NOTE — PHYSICAL THERAPY INITIAL EVALUATION ADULT - LEVEL OF INDEPENDENCE: STAIR NEGOTIATION, REHAB EVAL
Pt deferred stating stairs makes her sciatica flare up. Pt states her family will be with her at home for any assistance

## 2023-11-01 NOTE — DISCHARGE NOTE PROVIDER - NSDCCPTREATMENT_GEN_ALL_CORE_FT
PRINCIPAL PROCEDURE  Procedure: X-ray of chest, AP view  Findings and Treatment: Clear lungs.  Small sclerotic focus in the left humeral head that is similar to prior   study

## 2023-11-01 NOTE — DISCHARGE NOTE PROVIDER - HOSPITAL COURSE
HPI:  Ms. Pham Reynolds is a 63-year-old female w/ PMH of HLD and T2DM p/w a 1 week history of intermittent fevers, chills, nausea, and generalized weakness. Patient reports fatigue and generalized weakness starting 1 week ago. About 5 days ago, she began to endorse fever and chills. Overnight she began to experience arthralgias and then noticed a diffuse rash across her chest, back and b/l LEs this morning. Also of note, patient endorses one episode of red spotting in BM this morning. Symptoms have also been a/w nausea and vomiting. Denies SOB, diarrhea constipation, and dysuria. No sick contacts. In ED, patient was afebrile, hypotensive to 89/55, and tachycardic to 100. Labs significant for leukocytosis of 14.98 and lactate of 2.8. Patient received 1x dose of cefepime, vancomycin and 3L NS. Patient admitted to medicine for further workup and management.   (29 Oct 2023 18:04)    Hospital Course:  Patient had a largely uncomplicated hospital course. Despite presenting with SIRS criteria, infectious workup remained negative: RVP negative, clear CXR, negative UA/Ucx, Blood cx NGTD, urine chlamydia/gonococcal negative. Patient was continued on IV cefepime during workup. Dermatology was consulted iso diffuse palpable purpura and recommended parvovirus, EBV, and CMV testing, as well as a skin biopsy. While awaiting these results, patient began to clinically improve, no longer experiencing fever/chills and noted reduction of arthralgias.    Important Medication Changes and Reason:      Active or Pending Issues Requiring Follow-up:  - Follow up with dermatology    Advanced Directives:   [X] Full code  [ ] DNR  [ ] Hospice    Discharge Diagnoses:         HPI:  Ms. Pham Reynolds is a 63-year-old female w/ PMH of HLD and T2DM p/w a 1 week history of intermittent fevers, chills, nausea, and generalized weakness. Patient reports fatigue and generalized weakness starting 1 week ago. About 5 days ago, she began to endorse fever and chills. Overnight she began to experience arthralgias and then noticed a diffuse rash across her chest, back and b/l LEs this morning. Also of note, patient endorses one episode of red spotting in BM this morning. Symptoms have also been a/w nausea and vomiting. Denies SOB, diarrhea constipation, and dysuria. No sick contacts. In ED, patient was afebrile, hypotensive to 89/55, and tachycardic to 100. Labs significant for leukocytosis of 14.98 and lactate of 2.8. Patient received 1x dose of cefepime, vancomycin and 3L NS. Patient admitted to medicine for further workup and management.   (29 Oct 2023 18:04)    Hospital Course:  Patient had a largely uncomplicated hospital course. Despite presenting with SIRS criteria, infectious workup remained negative: RVP negative, clear CXR, negative UA/Ucx, Blood cx NGTD, urine chlamydia/gonococcal negative. Patient was continued on IV cefepime during workup. Dermatology was consulted iso diffuse palpable purpura and recommended parvovirus, EBV, and CMV testing, as well as a skin biopsy. While awaiting these results, patient began to clinically improve, no longer experiencing fever/chills and noted reduction of arthralgias.    Important Medication Changes and Reason:  Hold Statin therapy due to elevated LFTs    Active or Pending Issues Requiring Follow-up:  - Follow up with dermatology    Advanced Directives:   [X] Full code  [ ] DNR  [ ] Hospice    Discharge Diagnoses: Rash          HPI:  Ms. Pham Reynolds is a 63-year-old female w/ PMH of HLD and T2DM p/w a 1 week history of intermittent fevers, chills, nausea, and generalized weakness. Patient reports fatigue and generalized weakness starting 1 week ago. About 5 days ago, she began to endorse fever and chills. Overnight she began to experience arthralgias and then noticed a diffuse rash across her chest, back and b/l LEs this morning. Also of note, patient endorses one episode of red spotting in BM this morning. Symptoms have also been a/w nausea and vomiting. Denies SOB, diarrhea constipation, and dysuria. No sick contacts. In ED, patient was afebrile, hypotensive to 89/55, and tachycardic to 100. Labs significant for leukocytosis of 14.98 and lactate of 2.8. Patient received 1x dose of cefepime, vancomycin and 3L NS. Patient admitted to medicine for further workup and management.   (29 Oct 2023 18:04)    Hospital Course:  Patient had a largely uncomplicated hospital course. Despite presenting with SIRS criteria, infectious workup remained negative: RVP negative, clear CXR, negative UA/Ucx, Blood cx NGTD, urine chlamydia/gonococcal negative. Patient was continued on IV cefepime during workup. Dermatology was consulted iso diffuse palpable purpura and recommended parvovirus, EBV, and CMV testing, as well as a skin biopsy. While awaiting these results, patient began to clinically improve, no longer experiencing fever/chills and noted reduction of arthralgias. biopsy resulted with no evidence for a neutrophilic dermatosis, vasculitis or connective tissue disease.      Important Medication Changes and Reason:  Hold Statin therapy due to elevated LFTs      Active or Pending Issues Requiring Follow-up:  - Follow up with dermatology    Advanced Directives:   [X] Full code  [ ] DNR  [ ] Hospice    Discharge Diagnoses: Rash

## 2023-11-01 NOTE — DISCHARGE NOTE PROVIDER - CARE PROVIDER_API CALL
Olivia Hickman  Dermatology  [address], [address]  [place] [address]  Phone: [phone]  Fax: [phone]  Follow Up Time:    Olivia Hickman  Dermatology  [address], [address]  [place] [address]  Phone: [phone]  Fax: [phone]  Follow Up Time:    Olivia Hickman  Dermatology  [address], [address]  [place] [address]  Phone: [phone]  Fax: [phone]  Follow Up Time:

## 2023-11-01 NOTE — PHYSICAL THERAPY INITIAL EVALUATION ADULT - ADDITIONAL COMMENTS
Pt states she lives with her sister and family in a house with 4 steps to enter and remains on the main level. Prior to admission, pt was ambulating independently without any assistive devices.   Post PT evaluation, pt left semi-supine, alarm on, call bell and remote within reach, all precautions maintained, NAD. RN aware.

## 2023-11-01 NOTE — PROGRESS NOTE ADULT - ATTENDING COMMENTS
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  Patient is s/p biopsy by Dermatology , f/w   So far infectious work-up negative , f/w final cultures, STD , F/w biopsy.  C/w atb for now   F/w labs , correct electrolytes PRN  DVT px
63-year-old female with past medical history of diabetes complaining of a 1 week history of intermittent fevers, chills, nausea, and generalized weakness admitted for sepsis of unclear etiology and rash , looks like hives, papular rash.  Will call Dermatology to eval   F/w  pan-cultures , UA, CXR   Culture for Fever>101  F/w LFT's   MRSA swab , C/w atb for now   DVT PX
63-year-old female with past medical history of diabetes complaining of a 1 week history of intermittent fevers, chills, nausea, and generalized weakness admitted for SIRS , now resolved , found to have LE, upper arm urticarial rash.  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  F/w Derma recs  C/w hold Atorvastatin in setting of increased LFT's. Hepatitis panel is negative.   Avoid hematoxics  F/w labs , correct electrolytes PRN  DVT px

## 2023-11-01 NOTE — DISCHARGE NOTE PROVIDER - NSDCMRMEDTOKEN_GEN_ALL_CORE_FT
Altace 5 mg oral tablet:   glyburide-metformin 2.5 mg-500 mg oral tablet: 1 tab(s) orally 2 times a day  simvastatin 40 mg oral tablet: 1 tab(s) orally once a day (at bedtime)   glyburide-metformin 2.5 mg-500 mg oral tablet: 1 tab(s) orally 2 times a day   glyburide-metformin 2.5 mg-500 mg oral tablet: 1 tab(s) orally 2 times a day  Physical therapy: 3 times per week for 3 weeks   glyburide-metformin 2.5 mg-500 mg oral tablet: 1 tab(s) orally 2 times a day  ramipril: 5 milligram(s) orally once a day

## 2023-11-01 NOTE — DISCHARGE NOTE PROVIDER - NSFOLLOWUPCLINICS_GEN_ALL_ED_FT
[facility]  Dermatology  [address], [address]  [place] [address]  Phone: [phone]  Fax:      [facility]  Dermatology  [address], [address]  [place] [address]  Phone: [phone]  Fax:      [facility]  Dermatology  [address], [address]  [place] [address]  Phone: [phone]  Fax:

## 2023-11-01 NOTE — PHYSICAL THERAPY INITIAL EVALUATION ADULT - PERTINENT HX OF CURRENT PROBLEM, REHAB EVAL
63-year-old female with past medical history of diabetes complaining of a 1 week history of intermittent fevers, chills, nausea, and generalized weakness admitted for sepsis of unclear etiology.

## 2023-11-01 NOTE — DISCHARGE NOTE PROVIDER - NSDCFUADDAPPT_GEN_ALL_CORE_FT
Follow up with dermatology clinic within 2 weeks.      Follow up with dermatology clinic within 2 weeks.     Follow up with your primary care physician for further monitoring in 2-4 weeks. Please call to arrange appointment.

## 2023-11-01 NOTE — PHYSICAL THERAPY INITIAL EVALUATION ADULT - SIT-TO-STAND BALANCE
good minus Alar Island Pedicle Flap Text: The defect edges were debeveled with a #15 scalpel blade.  Given the location of the defect, shape of the defect and the proximity to the alar rim an island pedicle advancement flap was deemed most appropriate.  Using a sterile surgical marker, an appropriate advancement flap was drawn incorporating the defect, outlining the appropriate donor tissue and placing the expected incisions within the nasal ala running parallel to the alar rim. The area thus outlined was incised with a #15 scalpel blade.  The skin margins were undermined minimally to an appropriate distance in all directions around the primary defect and laterally outward around the island pedicle utilizing iris scissors.  There was minimal undermining beneath the pedicle flap.

## 2023-11-02 ENCOUNTER — TRANSCRIPTION ENCOUNTER (OUTPATIENT)
Age: 63
End: 2023-11-02

## 2023-11-02 VITALS
DIASTOLIC BLOOD PRESSURE: 83 MMHG | OXYGEN SATURATION: 100 % | SYSTOLIC BLOOD PRESSURE: 139 MMHG | RESPIRATION RATE: 16 BRPM | TEMPERATURE: 99 F | HEART RATE: 94 BPM

## 2023-11-02 LAB
B19V DNA FLD QL NAA+PROBE: SIGNIFICANT CHANGE UP IU/ML
B19V DNA FLD QL NAA+PROBE: SIGNIFICANT CHANGE UP IU/ML
GLUCOSE BLDC GLUCOMTR-MCNC: 298 MG/DL — HIGH (ref 70–99)
GLUCOSE BLDC GLUCOMTR-MCNC: 298 MG/DL — HIGH (ref 70–99)
GLUCOSE BLDC GLUCOMTR-MCNC: 313 MG/DL — HIGH (ref 70–99)
GLUCOSE BLDC GLUCOMTR-MCNC: 313 MG/DL — HIGH (ref 70–99)

## 2023-11-02 PROCEDURE — 99239 HOSP IP/OBS DSCHRG MGMT >30: CPT | Mod: GC

## 2023-11-02 PROCEDURE — 99233 SBSQ HOSP IP/OBS HIGH 50: CPT

## 2023-11-02 RX ORDER — SIMVASTATIN 20 MG/1
1 TABLET, FILM COATED ORAL
Qty: 0 | Refills: 0 | DISCHARGE

## 2023-11-02 RX ORDER — RAMIPRIL 5 MG
0 CAPSULE ORAL
Qty: 0 | Refills: 0 | DISCHARGE

## 2023-11-02 RX ORDER — RAMIPRIL 5 MG
5 CAPSULE ORAL
Qty: 0 | Refills: 0 | DISCHARGE
Start: 2023-11-02

## 2023-11-02 RX ADMIN — Medication 4: at 09:07

## 2023-11-02 RX ADMIN — Medication 3: at 12:54

## 2023-11-02 RX ADMIN — Medication 4 UNIT(S): at 09:06

## 2023-11-02 RX ADMIN — Medication 4 UNIT(S): at 12:54

## 2023-11-02 NOTE — PROGRESS NOTE ADULT - SUBJECTIVE AND OBJECTIVE BOX
PROGRESS NOTE:     Patient is a 63y old  Female who presents with a chief complaint of Sepsis of unclear etiology (31 Oct 2023 13:15)      SUBJECTIVE / OVERNIGHT EVENTS:    OVERNIGHT: No acute overnight events.      Patient was examined at bedside and feels better this morning. Endorses fatigue but continues to note improvement in arthralgias. Denies fever, chills, chest pain, SOB, nausea, vomiting. ROS otherwise negative and pt is amenable to current treatment plan.      REVIEW OF SYSTEMS:    CONSTITUTIONAL:  Weakness, no fevers or chills  EYES/ENT:  No visual changes, vertigo, or throat pain   NECK:  No pain or stiffness  RESPIRATORY:  No SOB, cough, wheezing, hemoptysis  CARDIOVASCULAR:  No chest pain or palpitations  GASTROINTESTINAL:  No abdominal pain, nausea, vomiting, or hematemesis; No diarrhea or constipation. No melena or hematochezia.  GENITOURINARY:  No dysuria, change in frequency, or hematuria  NEUROLOGICAL:  No numbness or weakness  SKIN:  Diffuse skin lesions of b/l LEs, chest, back      MEDICATIONS  (STANDING):  dextrose 5%. 1000 milliLiter(s) (100 mL/Hr) IV Continuous <Continuous>  dextrose 5%. 1000 milliLiter(s) (50 mL/Hr) IV Continuous <Continuous>  dextrose 50% Injectable 25 Gram(s) IV Push once  dextrose 50% Injectable 12.5 Gram(s) IV Push once  dextrose 50% Injectable 25 Gram(s) IV Push once  enoxaparin Injectable 40 milliGRAM(s) SubCutaneous every 24 hours  glucagon  Injectable 1 milliGRAM(s) IntraMuscular once  insulin glargine Injectable (LANTUS) 6 Unit(s) SubCutaneous at bedtime  insulin lispro (ADMELOG) corrective regimen sliding scale   SubCutaneous three times a day before meals  insulin lispro (ADMELOG) corrective regimen sliding scale   SubCutaneous at bedtime  insulin lispro Injectable (ADMELOG) 2 Unit(s) SubCutaneous three times a day before meals  melatonin 3 milliGRAM(s) Oral at bedtime    MEDICATIONS  (PRN):  acetaminophen     Tablet .. 650 milliGRAM(s) Oral every 6 hours PRN Temp greater or equal to 38C (100.4F), Mild Pain (1 - 3)  benzonatate 100 milliGRAM(s) Oral three times a day PRN Cough  dextrose Oral Gel 15 Gram(s) Oral once PRN Blood Glucose LESS THAN 70 milliGRAM(s)/deciliter  guaiFENesin Oral Liquid (Sugar-Free) 100 milliGRAM(s) Oral every 6 hours PRN Cough      CAPILLARY BLOOD GLUCOSE      POCT Blood Glucose.: 258 mg/dL (01 Nov 2023 08:35)  POCT Blood Glucose.: 230 mg/dL (31 Oct 2023 21:04)  POCT Blood Glucose.: 252 mg/dL (31 Oct 2023 17:23)  POCT Blood Glucose.: 364 mg/dL (31 Oct 2023 12:40)    I&O's Summary      PHYSICAL EXAM:  Vital Signs Last 24 Hrs  T(C): 36.8 (01 Nov 2023 05:35), Max: 37.6 (31 Oct 2023 18:00)  T(F): 98.3 (01 Nov 2023 05:35), Max: 99.6 (31 Oct 2023 18:00)  HR: 100 (01 Nov 2023 05:35) (98 - 108)  BP: 139/80 (01 Nov 2023 05:35) (132/77 - 160/84)  BP(mean): --  RR: 18 (01 Nov 2023 05:35) (18 - 18)  SpO2: 98% (01 Nov 2023 05:35) (98% - 100%)    Parameters below as of 01 Nov 2023 05:35  Patient On (Oxygen Delivery Method): room air        CONSTITUTIONAL: NAD; well-developed  HEENT: PERRL, clear conjunctiva  RESPIRATORY: Normal respiratory effort; lungs are clear to auscultation bilaterally; No Crackles/Rhonchi/Wheezing  CARDIOVASCULAR: Regular rate and rhythm, normal S1 and S2, no murmur/rub/gallop; No lower extremity edema; Peripheral pulses are 2+ bilaterally  ABDOMEN: Nontender to palpation, normoactive bowel sounds, no rebound/guarding; No hepatosplenomegaly  MUSCULOSKELETAL: no clubbing or cyanosis of digits; no joint swelling or tenderness to palpation  EXTREMITY: Diffuse palpable purpura of b/l LEs, chest, back  NEURO: A&Ox3; no focal deficits   PSYCH: normal mood; Affect appropriate    LABS:                        10.5   10.71 )-----------( 243      ( 01 Nov 2023 06:30 )             31.9     11-01    134<L>  |  98  |  9   ----------------------------<  257<H>  3.6   |  22  |  0.59    Ca    8.7      01 Nov 2023 06:30  Phos  2.4     11-01  Mg     2.00     11-01    TPro  7.1  /  Alb  3.1<L>  /  TBili  0.4  /  DBili  x   /  AST  34<H>  /  ALT  101<H>  /  AlkPhos  121<H>  11-01          Urinalysis Basic - ( 01 Nov 2023 06:30 )    Color: x / Appearance: x / SG: x / pH: x  Gluc: 257 mg/dL / Ketone: x  / Bili: x / Urobili: x   Blood: x / Protein: x / Nitrite: x   Leuk Esterase: x / RBC: x / WBC x   Sq Epi: x / Non Sq Epi: x / Bacteria: x        Culture - Urine (collected 29 Oct 2023 16:00)  Source: Clean Catch Clean Catch (Midstream)  Final Report (30 Oct 2023 11:26):    <10,000 CFU/mL Normal Urogenital Ale    Culture - Blood (collected 29 Oct 2023 15:21)  Source: .Blood Blood-Peripheral  Preliminary Report (31 Oct 2023 19:01):    No growth at 48 Hours    Culture - Blood (collected 29 Oct 2023 15:10)  Source: .Blood Blood-Peripheral  Preliminary Report (31 Oct 2023 19:01):    No growth at 48 Hours        RADIOLOGY & ADDITIONAL TESTS:    CXR (10/30):    IMPRESSION:  Clear lungs.  Small sclerotic focus in the left humeral head that is similar to prior   study. SUBJECTIVE / OVERNIGHT EVENTS: afebrile, tachycardiac but asymptomatic. denies having fever/chills, reports rash has improved. denies pruritus.      MEDICATIONS  (STANDING):  dextrose 5%. 1000 milliLiter(s) (100 mL/Hr) IV Continuous <Continuous>  dextrose 5%. 1000 milliLiter(s) (50 mL/Hr) IV Continuous <Continuous>  dextrose 50% Injectable 25 Gram(s) IV Push once  dextrose 50% Injectable 12.5 Gram(s) IV Push once  dextrose 50% Injectable 25 Gram(s) IV Push once  enoxaparin Injectable 40 milliGRAM(s) SubCutaneous every 24 hours  fluticasone propionate 50 MICROgram(s)/spray Nasal Spray 1 Spray(s) Both Nostrils once  glucagon  Injectable 1 milliGRAM(s) IntraMuscular once  insulin glargine Injectable (LANTUS) 12 Unit(s) SubCutaneous at bedtime  insulin lispro (ADMELOG) corrective regimen sliding scale   SubCutaneous three times a day before meals  insulin lispro (ADMELOG) corrective regimen sliding scale   SubCutaneous at bedtime  insulin lispro Injectable (ADMELOG) 4 Unit(s) SubCutaneous three times a day before meals  melatonin 3 milliGRAM(s) Oral at bedtime    MEDICATIONS  (PRN):  acetaminophen     Tablet .. 650 milliGRAM(s) Oral every 6 hours PRN Temp greater or equal to 38C (100.4F), Mild Pain (1 - 3)  benzonatate 100 milliGRAM(s) Oral three times a day PRN Cough  dextrose Oral Gel 15 Gram(s) Oral once PRN Blood Glucose LESS THAN 70 milliGRAM(s)/deciliter  guaiFENesin Oral Liquid (Sugar-Free) 100 milliGRAM(s) Oral every 6 hours PRN Cough      PHYSICAL EXAM:  Vital Signs Last 24 Hrs  T(C): 36.8 (02 Nov 2023 10:15), Max: 37.1 (02 Nov 2023 05:10)  T(F): 98.3 (02 Nov 2023 10:15), Max: 98.7 (02 Nov 2023 05:10)  HR: 118 (02 Nov 2023 10:15) (98 - 118)  BP: 137/81 (02 Nov 2023 10:15) (135/84 - 138/76)  BP(mean): --  RR: 18 (02 Nov 2023 10:15) (18 - 18)  SpO2: 100% (02 Nov 2023 10:15) (100% - 100%)    Parameters below as of 02 Nov 2023 10:15  Patient On (Oxygen Delivery Method): room air    CONSTITUTIONAL: NAD; well-developed  HEENT: PERRL, clear conjunctiva  RESPIRATORY: Normal respiratory effort; lungs are clear to auscultation bilaterally; No Crackles/Rhonchi/Wheezing  CARDIOVASCULAR: Regular rate and rhythm, normal S1 and S2, no murmur/rub/gallop; No lower extremity edema; Peripheral pulses are 2+ bilaterally  ABDOMEN: Nontender to palpation, normoactive bowel sounds, no rebound/guarding; No hepatosplenomegaly  MUSCULOSKELETAL: no clubbing or cyanosis of digits; no joint swelling or tenderness to palpation  EXTREMITY: Diffuse palpable purpura of b/l LEs, chest, back  NEURO: A&Ox3; no focal deficits   PSYCH: normal mood; Affect appropriate    LABS:                          10.5   10.71 )-----------( 243      ( 01 Nov 2023 06:30 )             31.9     11-01    134<L>  |  98  |  9   ----------------------------<  257<H>  3.6   |  22  |  0.59    Ca    8.7      01 Nov 2023 06:30  Phos  2.4     11-01  Mg     2.00     11-01    TPro  7.1  /  Alb  3.1<L>  /  TBili  0.4  /  DBili  x   /  AST  34<H>  /  ALT  101<H>  /  AlkPhos  121<H>  11-01      Urinalysis Basic - ( 01 Nov 2023 06:30 )    Color: x / Appearance: x / SG: x / pH: x  Gluc: 257 mg/dL / Ketone: x  / Bili: x / Urobili: x   Blood: x / Protein: x / Nitrite: x   Leuk Esterase: x / RBC: x / WBC x   Sq Epi: x / Non Sq Epi: x / Bacteria: x      CAPILLARY BLOOD GLUCOSE      POCT Blood Glucose.: 298 mg/dL (02 Nov 2023 12:20)      RADIOLOGY & ADDITIONAL TESTS: Reviewed.        CXR (10/30):    IMPRESSION:  Clear lungs.  Small sclerotic focus in the left humeral head that is similar to prior   study.

## 2023-11-02 NOTE — DISCHARGE NOTE NURSING/CASE MANAGEMENT/SOCIAL WORK - NSDCPEFALRISK_GEN_ALL_CORE
For information on Fall & Injury Prevention, visit: [email or web address] OR  [email or web address] OR  https://www.cdc.gov/steadi/patient.html

## 2023-11-02 NOTE — PROGRESS NOTE ADULT - PROBLEM SELECTOR PLAN 6
- DVT Ppx: Lovenox  - Diet: CC/DASH  - Dispo: TBD - DVT Ppx: Lovenox  - Diet: CC/DASH  - Dispo: patient is medically stable to be discharged today.

## 2023-11-02 NOTE — DISCHARGE NOTE NURSING/CASE MANAGEMENT/SOCIAL WORK - NSDCFUADDAPPT_GEN_ALL_CORE_FT
Follow up with dermatology clinic within 2 weeks.     Follow up with your primary care physician for further monitoring in 2-4 weeks. Please call to arrange appointment.

## 2023-11-02 NOTE — PROGRESS NOTE ADULT - REASON FOR ADMISSION
Sepsis of unclear etiology

## 2023-11-02 NOTE — PROGRESS NOTE ADULT - PROBLEM SELECTOR PLAN 4
- Holding home statin iso elevated LFTs - Holding home statin iso elevated LFTs  - cont to hold for now

## 2023-11-02 NOTE — PROGRESS NOTE ADULT - PROBLEM SELECTOR PLAN 1
- P/w 1wk h/o fever, chills, arthralgias, fatigue  - Of note, diffuse palpable purpura across chest, back, b/l LEs on exam, not pruritic or painful  - In ED, SIRS criteria w/ tachycardia, hypotension, leukocytosis, elevated lactate  - Lactate downtrending, CTM  - Elevated ESR, CRP  - RVP negative  - S/p 1x dose vancomycin, cefepime in ED  - S/p 3L NS  - S/p vancomycin iso negative MRSA  - Blood cx NGTD; urine cx normal urogenital keron  - Urine chlamydia, gonococcal negative  - CXR (10/30) -> Unremarkable  - C/w cefepime  - Dermatology consulted, obtained skin biopsy x2, recommend parvovirus, EBV, CMV tests, currently pending P/w 1wk h/o fever, chills, arthralgias, fatigue, with diffuse palpable purpura across chest, back, b/l LEs on exam, not pruritic or painful  - In ED, SIRS criteria w/ tachycardia, hypotension, leukocytosis, elevated lactate  - Lactate downtrending, CTM  - Elevated ESR, CRP  - RVP negative  - s/p vanc and cefepime, now SC'ed due to low suspicion of bacterial infection.   - Blood cx NGTD; urine cx normal urogenital keron  - Urine chlamydia, gonococcal negative  - CXR (10/30) -> Unremarkable  - Dermatology consulted, obtained skin biopsy x2, which showed :  no evidence for a neutrophilic dermatosis, vasculitis or connective tissue disease.  - per derm, fu outpatient

## 2023-11-02 NOTE — PROGRESS NOTE ADULT - PROBLEM SELECTOR PLAN 2
- P/w mildly elevated LFTs: AST 46, ALT 84  - Acute hepatitis panel unremarkable  - Trend LFTs, CTM - P/w mildly elevated LFTs: AST 46, ALT 84  - Acute hepatitis panel unremarkable  - Trend LFTs, CTM  - per patient, has known hx of chronically elev transaminitis

## 2023-11-02 NOTE — PROGRESS NOTE ADULT - SUBJECTIVE AND OBJECTIVE BOX
INTERVAL HPI/OVERNIGHT EVENTS: Patient reports feeling much better and wants to go home. She reports the rash continues to be asymptomatic for her and has now changed to a brown color.     MEDICATIONS  (STANDING):  dextrose 5%. 1000 milliLiter(s) (100 mL/Hr) IV Continuous <Continuous>  dextrose 5%. 1000 milliLiter(s) (50 mL/Hr) IV Continuous <Continuous>  dextrose 50% Injectable 25 Gram(s) IV Push once  dextrose 50% Injectable 12.5 Gram(s) IV Push once  dextrose 50% Injectable 25 Gram(s) IV Push once  enoxaparin Injectable 40 milliGRAM(s) SubCutaneous every 24 hours  fluticasone propionate 50 MICROgram(s)/spray Nasal Spray 1 Spray(s) Both Nostrils once  glucagon  Injectable 1 milliGRAM(s) IntraMuscular once  insulin glargine Injectable (LANTUS) 12 Unit(s) SubCutaneous at bedtime  insulin lispro (ADMELOG) corrective regimen sliding scale   SubCutaneous three times a day before meals  insulin lispro (ADMELOG) corrective regimen sliding scale   SubCutaneous at bedtime  insulin lispro Injectable (ADMELOG) 4 Unit(s) SubCutaneous three times a day before meals  melatonin 3 milliGRAM(s) Oral at bedtime    MEDICATIONS  (PRN):  acetaminophen     Tablet .. 650 milliGRAM(s) Oral every 6 hours PRN Temp greater or equal to 38C (100.4F), Mild Pain (1 - 3)  benzonatate 100 milliGRAM(s) Oral three times a day PRN Cough  dextrose Oral Gel 15 Gram(s) Oral once PRN Blood Glucose LESS THAN 70 milliGRAM(s)/deciliter  guaiFENesin Oral Liquid (Sugar-Free) 100 milliGRAM(s) Oral every 6 hours PRN Cough      Allergies    penicillins (Swelling)    Intolerances        REVIEW OF SYSTEMS      General: no fevers/chills, no NS	    Skin: see HPI  	  Ophthalmologic: no eye pain or change in vision    Genitourinary: no dysuria or hematuria    Musculoskeletal: no joint pains or weakness	    Neurological:no weakness or tingling          Vital Signs Last 24 Hrs  T(C): 36.8 (02 Nov 2023 10:15), Max: 37.1 (02 Nov 2023 05:10)  T(F): 98.3 (02 Nov 2023 10:15), Max: 98.7 (02 Nov 2023 05:10)  HR: 118 (02 Nov 2023 10:15) (98 - 118)  BP: 137/81 (02 Nov 2023 10:15) (135/84 - 137/81)  BP(mean): --  RR: 18 (02 Nov 2023 10:15) (18 - 18)  SpO2: 100% (02 Nov 2023 10:15) (100% - 100%)    Parameters below as of 02 Nov 2023 10:15  Patient On (Oxygen Delivery Method): room air        PHYSICAL EXAM:   The patient was alert and in no  apparent distress.  There was no visible lymphadenopathy.  Conjunctiva were non injected  There was no clubbing or edema of extremities.    Of note on skin exam:   scattered brown macules on the extremities> trunk  no active erythema or edema on exam today        LABS:                        10.5   10.71 )-----------( 243      ( 01 Nov 2023 06:30 )             31.9     11-01    134<L>  |  98  |  9   ----------------------------<  257<H>  3.6   |  22  |  0.59    Ca    8.7      01 Nov 2023 06:30  Phos  2.4     11-01  Mg     2.00     11-01    TPro  7.1  /  Alb  3.1<L>  /  TBili  0.4  /  DBili  x   /  AST  34<H>  /  ALT  101<H>  /  AlkPhos  121<H>  11-01      Urinalysis Basic - ( 01 Nov 2023 06:30 )    Color: x / Appearance: x / SG: x / pH: x  Gluc: 257 mg/dL / Ketone: x  / Bili: x / Urobili: x   Blood: x / Protein: x / Nitrite: x   Leuk Esterase: x / RBC: x / WBC x   Sq Epi: x / Non Sq Epi: x / Bacteria: x

## 2023-11-02 NOTE — PROGRESS NOTE ADULT - PROBLEM SELECTOR PLAN 5
- Holding home metformin-glyburide  - C/w low-dose ISS
- Holding home metformin-glyburide, will restart upon discharge  - C/w Lantus 6U qhs, 2U AC, low-dose ISS  - CTM, FSGs
- Holding home metformin-glyburide  - C/w Lantus 6U qhs, 2U AC, low-dose ISS  - CTM, FSGs
- Holding home metformin-glyburide, will restart upon discharge  - C/w Lantus 6U qhs, 2U AC, low-dose ISS  - CTM, FSGs

## 2023-11-02 NOTE — DISCHARGE NOTE NURSING/CASE MANAGEMENT/SOCIAL WORK - PATIENT PORTAL LINK FT
You can access the FollowMyHealth Patient Portal offered by [facility] by registering at the following website: [email or web address] By joining [email or web address]’s FollowMyHealth portal, you will also be able to view your health information using other applications (apps) compatible with our system.

## 2023-11-03 LAB
CULTURE RESULTS: SIGNIFICANT CHANGE UP
SPECIMEN SOURCE: SIGNIFICANT CHANGE UP

## 2023-11-16 ENCOUNTER — APPOINTMENT (OUTPATIENT)
Dept: DERMATOLOGY | Facility: CLINIC | Age: 63
End: 2023-11-16
Payer: COMMERCIAL

## 2023-11-16 PROCEDURE — 99204 OFFICE O/P NEW MOD 45 MIN: CPT

## 2024-01-18 ENCOUNTER — APPOINTMENT (OUTPATIENT)
Dept: DERMATOLOGY | Facility: CLINIC | Age: 64
End: 2024-01-18

## 2024-03-17 ENCOUNTER — INPATIENT (INPATIENT)
Facility: HOSPITAL | Age: 64
LOS: 4 days | Discharge: HOME | DRG: 607 | End: 2024-03-22
Attending: INTERNAL MEDICINE | Admitting: STUDENT IN AN ORGANIZED HEALTH CARE EDUCATION/TRAINING PROGRAM
Payer: COMMERCIAL

## 2024-03-17 VITALS
RESPIRATION RATE: 18 BRPM | SYSTOLIC BLOOD PRESSURE: 129 MMHG | DIASTOLIC BLOOD PRESSURE: 90 MMHG | OXYGEN SATURATION: 98 % | WEIGHT: 117.07 LBS | HEIGHT: 62 IN | HEART RATE: 131 BPM | TEMPERATURE: 98 F

## 2024-03-17 PROCEDURE — 99285 EMERGENCY DEPT VISIT HI MDM: CPT

## 2024-03-17 NOTE — ED ADULT TRIAGE NOTE - CHIEF COMPLAINT QUOTE
painful rash, dx with contact dermatitis in January 2024 but rash has increased in size and painfulness.  pt with hx of high heart rates

## 2024-03-18 ENCOUNTER — RESULT REVIEW (OUTPATIENT)
Age: 64
End: 2024-03-18

## 2024-03-18 DIAGNOSIS — R00.0 TACHYCARDIA, UNSPECIFIED: ICD-10-CM

## 2024-03-18 LAB
ADD ON TEST-SPECIMEN IN LAB: SIGNIFICANT CHANGE UP
ALBUMIN SERPL ELPH-MCNC: 3.8 G/DL — SIGNIFICANT CHANGE UP (ref 3.3–5)
ALP SERPL-CCNC: 180 U/L — HIGH (ref 40–120)
ALT FLD-CCNC: 44 U/L — SIGNIFICANT CHANGE UP (ref 10–45)
ANION GAP SERPL CALC-SCNC: 16 MMOL/L — SIGNIFICANT CHANGE UP (ref 5–17)
APTT BLD: 31.6 SEC — SIGNIFICANT CHANGE UP (ref 24.5–35.6)
AST SERPL-CCNC: 26 U/L — SIGNIFICANT CHANGE UP (ref 10–40)
BASOPHILS # BLD AUTO: 0.04 K/UL — SIGNIFICANT CHANGE UP (ref 0–0.2)
BASOPHILS NFR BLD AUTO: 0.4 % — SIGNIFICANT CHANGE UP (ref 0–2)
BILIRUB SERPL-MCNC: 0.2 MG/DL — SIGNIFICANT CHANGE UP (ref 0.2–1.2)
BUN SERPL-MCNC: 15 MG/DL — SIGNIFICANT CHANGE UP (ref 7–23)
CALCIUM SERPL-MCNC: 9.8 MG/DL — SIGNIFICANT CHANGE UP (ref 8.4–10.5)
CHLORIDE SERPL-SCNC: 101 MMOL/L — SIGNIFICANT CHANGE UP (ref 96–108)
CO2 SERPL-SCNC: 20 MMOL/L — LOW (ref 22–31)
CREAT SERPL-MCNC: 0.63 MG/DL — SIGNIFICANT CHANGE UP (ref 0.5–1.3)
CRP SERPL-MCNC: 17 MG/L — HIGH (ref 0–4)
EGFR: 100 ML/MIN/1.73M2 — SIGNIFICANT CHANGE UP
EOSINOPHIL # BLD AUTO: 0.24 K/UL — SIGNIFICANT CHANGE UP (ref 0–0.5)
EOSINOPHIL NFR BLD AUTO: 2.6 % — SIGNIFICANT CHANGE UP (ref 0–6)
ERYTHROCYTE [SEDIMENTATION RATE] IN BLOOD: 120 MM/HR — HIGH (ref 0–20)
FT4I SERPL CALC-MCNC: 3 FTI% — SIGNIFICANT CHANGE UP (ref 1.4–4.8)
FT4I SERPL CALC-MCNC: 8.2 INDEX — SIGNIFICANT CHANGE UP (ref 4.4–11.4)
GLUCOSE BLDC GLUCOMTR-MCNC: 143 MG/DL — HIGH (ref 70–99)
GLUCOSE BLDC GLUCOMTR-MCNC: 155 MG/DL — HIGH (ref 70–99)
GLUCOSE BLDC GLUCOMTR-MCNC: 206 MG/DL — HIGH (ref 70–99)
GLUCOSE SERPL-MCNC: 162 MG/DL — HIGH (ref 70–99)
HCT VFR BLD CALC: 37.4 % — SIGNIFICANT CHANGE UP (ref 34.5–45)
HGB BLD-MCNC: 12 G/DL — SIGNIFICANT CHANGE UP (ref 11.5–15.5)
IMM GRANULOCYTES NFR BLD AUTO: 0.5 % — SIGNIFICANT CHANGE UP (ref 0–0.9)
INR BLD: 0.98 RATIO — SIGNIFICANT CHANGE UP (ref 0.85–1.18)
LYMPHOCYTES # BLD AUTO: 1.91 K/UL — SIGNIFICANT CHANGE UP (ref 1–3.3)
LYMPHOCYTES # BLD AUTO: 20.5 % — SIGNIFICANT CHANGE UP (ref 13–44)
MCHC RBC-ENTMCNC: 25.7 PG — LOW (ref 27–34)
MCHC RBC-ENTMCNC: 32.1 GM/DL — SIGNIFICANT CHANGE UP (ref 32–36)
MCV RBC AUTO: 80.1 FL — SIGNIFICANT CHANGE UP (ref 80–100)
MONOCYTES # BLD AUTO: 0.79 K/UL — SIGNIFICANT CHANGE UP (ref 0–0.9)
MONOCYTES NFR BLD AUTO: 8.5 % — SIGNIFICANT CHANGE UP (ref 2–14)
NEUTROPHILS # BLD AUTO: 6.29 K/UL — SIGNIFICANT CHANGE UP (ref 1.8–7.4)
NEUTROPHILS NFR BLD AUTO: 67.5 % — SIGNIFICANT CHANGE UP (ref 43–77)
NRBC # BLD: 0 /100 WBCS — SIGNIFICANT CHANGE UP (ref 0–0)
PLATELET # BLD AUTO: 316 K/UL — SIGNIFICANT CHANGE UP (ref 150–400)
POTASSIUM SERPL-MCNC: 4.1 MMOL/L — SIGNIFICANT CHANGE UP (ref 3.5–5.3)
POTASSIUM SERPL-SCNC: 4.1 MMOL/L — SIGNIFICANT CHANGE UP (ref 3.5–5.3)
PROT SERPL-MCNC: 8.2 G/DL — SIGNIFICANT CHANGE UP (ref 6–8.3)
PROTHROM AB SERPL-ACNC: 10.8 SEC — SIGNIFICANT CHANGE UP (ref 9.5–13)
RBC # BLD: 4.67 M/UL — SIGNIFICANT CHANGE UP (ref 3.8–5.2)
RBC # FLD: 13.2 % — SIGNIFICANT CHANGE UP (ref 10.3–14.5)
SODIUM SERPL-SCNC: 137 MMOL/L — SIGNIFICANT CHANGE UP (ref 135–145)
T3/T3 UPTAKE INDEX SERPL-RTO: 35 % — SIGNIFICANT CHANGE UP (ref 28–41)
T4 AB SER-ACNC: 8.4 UG/DL — SIGNIFICANT CHANGE UP (ref 4.6–12)
T4/T3 UPTAKE INDEX SERPL: 1 TBI — SIGNIFICANT CHANGE UP (ref 0.8–1.3)
TROPONIN T, HIGH SENSITIVITY RESULT: <6 NG/L — SIGNIFICANT CHANGE UP (ref 0–51)
TSH SERPL-MCNC: 1.08 UIU/ML — SIGNIFICANT CHANGE UP (ref 0.27–4.2)
WBC # BLD: 9.32 K/UL — SIGNIFICANT CHANGE UP (ref 3.8–10.5)
WBC # FLD AUTO: 9.32 K/UL — SIGNIFICANT CHANGE UP (ref 3.8–10.5)

## 2024-03-18 PROCEDURE — 11104 PUNCH BX SKIN SINGLE LESION: CPT

## 2024-03-18 PROCEDURE — 88342 IMHCHEM/IMCYTCHM 1ST ANTB: CPT | Mod: 26

## 2024-03-18 PROCEDURE — 99253 IP/OBS CNSLTJ NEW/EST LOW 45: CPT | Mod: 25,GC

## 2024-03-18 PROCEDURE — 88312 SPECIAL STAINS GROUP 1: CPT | Mod: 26

## 2024-03-18 PROCEDURE — 71046 X-RAY EXAM CHEST 2 VIEWS: CPT | Mod: 26

## 2024-03-18 PROCEDURE — 88305 TISSUE EXAM BY PATHOLOGIST: CPT | Mod: 26

## 2024-03-18 RX ORDER — SODIUM CHLORIDE 9 MG/ML
1000 INJECTION, SOLUTION INTRAVENOUS
Refills: 0 | Status: DISCONTINUED | OUTPATIENT
Start: 2024-03-18 | End: 2024-03-22

## 2024-03-18 RX ORDER — SODIUM CHLORIDE 9 MG/ML
1000 INJECTION INTRAMUSCULAR; INTRAVENOUS; SUBCUTANEOUS ONCE
Refills: 0 | Status: COMPLETED | OUTPATIENT
Start: 2024-03-18 | End: 2024-03-18

## 2024-03-18 RX ORDER — DEXTROSE 50 % IN WATER 50 %
25 SYRINGE (ML) INTRAVENOUS ONCE
Refills: 0 | Status: DISCONTINUED | OUTPATIENT
Start: 2024-03-18 | End: 2024-03-22

## 2024-03-18 RX ORDER — LISINOPRIL 2.5 MG/1
20 TABLET ORAL DAILY
Refills: 0 | Status: DISCONTINUED | OUTPATIENT
Start: 2024-03-18 | End: 2024-03-22

## 2024-03-18 RX ORDER — DEXTROSE 50 % IN WATER 50 %
15 SYRINGE (ML) INTRAVENOUS ONCE
Refills: 0 | Status: DISCONTINUED | OUTPATIENT
Start: 2024-03-18 | End: 2024-03-22

## 2024-03-18 RX ORDER — KETOROLAC TROMETHAMINE 30 MG/ML
30 SYRINGE (ML) INJECTION ONCE
Refills: 0 | Status: DISCONTINUED | OUTPATIENT
Start: 2024-03-18 | End: 2024-03-18

## 2024-03-18 RX ORDER — ATORVASTATIN CALCIUM 80 MG/1
40 TABLET, FILM COATED ORAL AT BEDTIME
Refills: 0 | Status: DISCONTINUED | OUTPATIENT
Start: 2024-03-18 | End: 2024-03-22

## 2024-03-18 RX ORDER — GABAPENTIN 400 MG/1
300 CAPSULE ORAL THREE TIMES A DAY
Refills: 0 | Status: DISCONTINUED | OUTPATIENT
Start: 2024-03-18 | End: 2024-03-22

## 2024-03-18 RX ORDER — INSULIN LISPRO 100/ML
VIAL (ML) SUBCUTANEOUS
Refills: 0 | Status: DISCONTINUED | OUTPATIENT
Start: 2024-03-18 | End: 2024-03-22

## 2024-03-18 RX ORDER — GLYBURIDE-METFORMIN HYDROCHLORIDE 1.25; 25 MG/1; MG/1
1 TABLET ORAL
Qty: 0 | Refills: 0 | DISCHARGE

## 2024-03-18 RX ORDER — DIPHENHYDRAMINE HCL 50 MG
25 CAPSULE ORAL ONCE
Refills: 0 | Status: COMPLETED | OUTPATIENT
Start: 2024-03-18 | End: 2024-03-18

## 2024-03-18 RX ORDER — DEXTROSE 50 % IN WATER 50 %
12.5 SYRINGE (ML) INTRAVENOUS ONCE
Refills: 0 | Status: DISCONTINUED | OUTPATIENT
Start: 2024-03-18 | End: 2024-03-22

## 2024-03-18 RX ORDER — ACETAMINOPHEN 500 MG
1000 TABLET ORAL ONCE
Refills: 0 | Status: COMPLETED | OUTPATIENT
Start: 2024-03-18 | End: 2024-03-18

## 2024-03-18 RX ORDER — GLUCAGON INJECTION, SOLUTION 0.5 MG/.1ML
1 INJECTION, SOLUTION SUBCUTANEOUS ONCE
Refills: 0 | Status: DISCONTINUED | OUTPATIENT
Start: 2024-03-18 | End: 2024-03-22

## 2024-03-18 RX ADMIN — Medication 400 MILLIGRAM(S): at 05:20

## 2024-03-18 RX ADMIN — ATORVASTATIN CALCIUM 40 MILLIGRAM(S): 80 TABLET, FILM COATED ORAL at 21:30

## 2024-03-18 RX ADMIN — SODIUM CHLORIDE 1000 MILLILITER(S): 9 INJECTION INTRAMUSCULAR; INTRAVENOUS; SUBCUTANEOUS at 01:53

## 2024-03-18 RX ADMIN — SODIUM CHLORIDE 1000 MILLILITER(S): 9 INJECTION INTRAMUSCULAR; INTRAVENOUS; SUBCUTANEOUS at 07:36

## 2024-03-18 RX ADMIN — Medication 1000 MILLIGRAM(S): at 07:36

## 2024-03-18 RX ADMIN — Medication 30 MILLIGRAM(S): at 13:16

## 2024-03-18 RX ADMIN — GABAPENTIN 300 MILLIGRAM(S): 400 CAPSULE ORAL at 13:20

## 2024-03-18 RX ADMIN — Medication 30 MILLIGRAM(S): at 10:57

## 2024-03-18 RX ADMIN — Medication 25 MILLIGRAM(S): at 05:21

## 2024-03-18 RX ADMIN — Medication 1: at 15:31

## 2024-03-18 RX ADMIN — SODIUM CHLORIDE 1000 MILLILITER(S): 9 INJECTION INTRAMUSCULAR; INTRAVENOUS; SUBCUTANEOUS at 03:02

## 2024-03-18 RX ADMIN — GABAPENTIN 300 MILLIGRAM(S): 400 CAPSULE ORAL at 21:30

## 2024-03-18 NOTE — H&P ADULT - ASSESSMENT
This is a 63 year old female PMH: HLD and DM2. presents to [facility] for a progressing painful rash x 2 months.    Plan:    # Rash:  - Sed rate 120, CRP 17  - CXR negative  - Local site care  - S/p outpt biopsy  - Derm eval pending (called)    # Sinus Tachy:  - Monitor on tele    # HLD:  - C/w current meds    # DM2:  - HgbA1c  - Continue to monitor blood glucose levels  - Sliding Scale    # GI ppx:  - Bowel regimen prn    # DVT ppx:  - IPC's    [employer]

## 2024-03-18 NOTE — ED ADULT NURSE REASSESSMENT NOTE - NS ED NURSE REASSESS COMMENT FT1
Still awaiting reply from ANGELIKA Griffin regarding PT's request for medication. Called ANGELIKA Griffin and was placed on hold with no response. Sent TEAMS message to NP Desiree Ty who is placing orders for this patient at this time. Awaiting reply.

## 2024-03-18 NOTE — ED ADULT NURSE REASSESSMENT NOTE - NS ED NURSE REASSESS COMMENT FT1
PT is resting comfortably in bed, breathing unlabored on room air, and speaking in complete sentences. Updated PT on plan of care. PT admitted to telemetry, awaiting a bed. PT on portable cardiac monitor. Safety and comfort maintained.

## 2024-03-18 NOTE — ED ADULT NURSE REASSESSMENT NOTE - NS ED NURSE REASSESS COMMENT FT1
Report taken from COURT RN. Pt introduced to oncoming RN and updated on plan of care. pt is A&OX4, Ambulatory, VSS. Call bell in reach, pt educated on use. Bed locked and in lowest position. Denies any needs or complaints at this time. pending dispo

## 2024-03-18 NOTE — ED ADULT NURSE REASSESSMENT NOTE - NS ED NURSE REASSESS COMMENT FT1
PT adamantly insisting that she receive an abdominal US at the guidance of her hepatologist DR. Kevin Knapp. Sent message to ANGELIKA Griffin regarding PT's request with a picture of a message from PT's hepatologist that states "please ensure the medical team is aware of your elevated ALP levels and negative AMA, with macrohepatic izoenzymes at 17% fractionation. I recommend getting an abdominal sonogram as soon as possible. If the results come back negative, you may need to have an MR abdomen with contrast for further investigation." Awaiting reply from ANGELIKA Griffin regarding PT's request.

## 2024-03-18 NOTE — H&P ADULT - HISTORY OF PRESENT ILLNESS
This is a 63 year old female PMH: HLD and DM2. presents to [facility] for a progressing painful rash x 2 months. Rash began on her BL wrists and was seen and treated for contact dermatitis, however the rash has since worsened and spread to her BL arms and trunk back abd and face. Denies any known exacerbating or alleviating factors. Associated symptoms are tachycardia and fatigue. She reports seeing an allergist and dermatologist however symptoms persisted and worsening Admitted for further evaluation.

## 2024-03-18 NOTE — ED ADULT NURSE NOTE - CHPI ED NUR SYMPTOMS NEG
no body aches/no chills/no confusion/no decreased eating/drinking/no fever/no petechia/no scaly patches on skin/no vomiting

## 2024-03-18 NOTE — ED PROVIDER NOTE - OBJECTIVE STATEMENT
63-year-old female with past medical history of hyperlipidemia and diabetes presents to emergency department for evaluation of progressively worsening rash x 2 months.  Patient states she was initially seen by [facility], we did biopsy of skin rash which initially began on her bilateral breasts.  She was diagnosed with contact dermatitis and started on topical steroid cream.  The rash progressively got worse, spreading to her arms, was reevaluated by dermatology and started on second topical steroid cream.  During this trial with multiple creams was seen by her PMD and allergist as well as infectious disease specialist.  Patient was told she has allergy to nickel, but no other known environmental allergies that could explain symptoms.  Was seen by infectious disease, and told she has hepatitis a and B, but no other explanation for cause of rash.  Patient has noticed over the past 2 months while on various treatments, has had elevated heart rate.  States she will take her heart rate and it will be in the 130s/140s even while at rest.  Denies fevers, chills, joint pain, chest pain, palpitations, difficulty breathing, abdominal pain, nausea/vomiting or diarrhea.  No other infectious signs.  No known drug allergies, environmental allergies.  Cannot recall new detergents, soaps, creams, lotions or perfumes. Rash has now spread to entirety of bilateral upper extremities, trunk, back, abdomen and is now spreading onto face.  Of note, patient had hospitalization in the fall of last year for sepsis, evaluated by dermatology due to rash.  Patient states that she had a.  Of no rash between that hospitalization and her symptoms beginning in January of this year. 63-year-old female with past medical history of hyperlipidemia and diabetes presents to emergency department for evaluation of progressively worsening rash x 2 months.  Patient states she was initially seen by [facility], we did biopsy of skin rash which initially began on her bilateral wrists.  She was diagnosed with contact dermatitis and started on topical steroid cream.  The rash progressively got worse, spreading to her arms, was reevaluated by dermatology and started on second topical steroid cream.  During this trial with multiple creams was seen by her PMD and allergist as well as infectious disease specialist.  Patient was told she has allergy to nickel, but no other known environmental allergies that could explain symptoms.  Was seen by infectious disease, and told she has hepatitis a and B, but no other explanation for cause of rash.  Patient has noticed over the past 2 months while on various treatments, has had elevated heart rate.  States she will take her heart rate and it will be in the 130s/140s even while at rest.  Denies fevers, chills, joint pain, chest pain, palpitations, difficulty breathing, abdominal pain, nausea/vomiting or diarrhea.  No other infectious signs.  No known drug allergies, environmental allergies.  Cannot recall new detergents, soaps, creams, lotions or perfumes. Rash has now spread to entirety of bilateral upper extremities, trunk, back, abdomen and is now spreading onto face.  Of note, patient had hospitalization in the fall of last year for sepsis, evaluated by dermatology due to rash.  Patient states that she had a.  Of no rash between that hospitalization and her symptoms beginning in January of this year.

## 2024-03-18 NOTE — ED ADULT NURSE REASSESSMENT NOTE - NS ED NURSE REASSESS COMMENT FT1
Report received from VITOR Campos. PT is resting comfortably in bed, breathing unlabored on room air, and speaking in complete sentences. Updated PT on plan of care. PT admitted to telemetry, awaiting a bed. PT provided with breakfast tray at this time. Safety and comfort maintained. Call bell within reach.

## 2024-03-18 NOTE — H&P ADULT - NSHPLABSRESULTS_GEN_ALL_CORE
LABS:                        12.0   9.32  )-----------( 316      ( 18 Mar 2024 01:31 )             37.4     03-18    137  |  101  |  15  ----------------------------<  162<H>  4.1   |  20<L>  |  0.63    Ca    9.8      18 Mar 2024 01:31    TPro  8.2  /  Alb  3.8  /  TBili  0.2  /  DBili  x   /  AST  26  /  ALT  44  /  AlkPhos  180<H>  03-18    PT/INR - ( 18 Mar 2024 01:31 )   PT: 10.8 sec;   INR: 0.98 ratio         PTT - ( 18 Mar 2024 01:31 )  PTT:31.6 sec  CAPILLARY BLOOD GLUCOSE            Urinalysis Basic - ( 18 Mar 2024 01:31 )    Color: x / Appearance: x / SG: x / pH: x  Gluc: 162 mg/dL / Ketone: x  / Bili: x / Urobili: x   Blood: x / Protein: x / Nitrite: x   Leuk Esterase: x / RBC: x / WBC x   Sq Epi: x / Non Sq Epi: x / Bacteria: x        RADIOLOGY & ADDITIONAL TESTS: < from: Xray Chest 2 Views PA/Lat (03.18.24 @ 02:33) >      IMPRESSION:  No acute findings in the chest.    < end of copied text >        Imaging Personally Reviewed:  [x] YES  [ ] NO    Consultant(s) Notes Reviewed:  [x] YES  [ ] NO    MEDICATIONS  (STANDING):    MEDICATIONS  (PRN):      Care Discussed with Consultants/Other Providers [x] YES  [ ] NO    Vital Signs Last 24 Hrs  T(C): 36.6 (18 Mar 2024 07:35), Max: 36.8 (18 Mar 2024 05:36)  T(F): 97.8 (18 Mar 2024 07:35), Max: 98.2 (18 Mar 2024 05:36)  HR: 98 (18 Mar 2024 07:35) (95 - 131)  BP: 113/84 (18 Mar 2024 07:35) (113/84 - 131/85)  BP(mean): 95 (18 Mar 2024 07:35) (95 - 100)  RR: 18 (18 Mar 2024 07:35) (16 - 18)  SpO2: 100% (18 Mar 2024 07:35) (98% - 100%)    Parameters below as of 18 Mar 2024 07:35  Patient On (Oxygen Delivery Method): room air      I&O's Summary

## 2024-03-18 NOTE — ED ADULT NURSE REASSESSMENT NOTE - NS ED NURSE REASSESS COMMENT FT1
PT asking if her care can be transferred to an [employer] provider? Messaged ACP Emily Griffin VIA TEAMS chat regarding PT's request.

## 2024-03-18 NOTE — H&P ADULT - NSHPREVIEWOFSYSTEMS_GEN_ALL_CORE
GENERAL: + fatigue  EYES/ENT: No visual changes, no vertigo or throat pain  NECK: No pain or stiffness   RESPIRATORY: no cough, no wheezing, no hemoptysis, no dyspnea, no shortness of breath  CARDIOVASCULAR: no chest pain. + tachycardia  GASTROINTESTINAL: no n/v/d, no abdominal or epigastric pain  GENITOURINARY: no dysuria, no frequency, no nocturia, no hematuria  MUSCULOSKELETAL: no trauma, no sprain/strain, no myalgias, no arthralgias, no fracture  NEUROLOGICAL: no HA, no dizziness, no weakness, no numbness  SKIN: + rash

## 2024-03-18 NOTE — ED PROVIDER NOTE - CLINICAL SUMMARY MEDICAL DECISION MAKING FREE TEXT BOX
63-year-old female with past medical history of hyperlipidemia and diabetes presents to emergency department for evaluation of progressively worsening rash x 2 months. Patient noted to be tachycardic to 140s, EKG with sinus tachycardia. Will check labs to evaluate for eosinophilic predominance, worsening of inflammatory markers. Will assess for infection or underlying metabolic or endocrine abnormality as cause of tachycardia. Will attempt to hydrate and bring HR down. Patient may require admission.

## 2024-03-18 NOTE — ED ADULT NURSE REASSESSMENT NOTE - NS ED NURSE REASSESS COMMENT FT1
PT is resting comfortably in bed, breathing unlabored on room air, and speaking in complete sentences. Updated PT on plan of care. PT admitted to telemetry, awaiting a bed. Safety and comfort maintained.

## 2024-03-18 NOTE — CONSULT NOTE ADULT - SUBJECTIVE AND OBJECTIVE BOX
HPI:  This is a 63 year old female PMH: HLD and DM2. presents to [facility] for a progressing painful rash x 2 months. Rash began on her BL wrists and was seen and treated for contact dermatitis, however the rash has since worsened and spread to her BL arms and trunk back abd and face. Denies any known exacerbating or alleviating factors. Associated symptoms are tachycardia and fatigue. She reports seeing an allergist and dermatologist however symptoms persisted and worsening Admitted for further evaluation. (18 Mar 2024 08:34)    Derm history:  Dermatology consulted on 3/18 for evaluation of progressive rash on upper body (waist up). Started as small patch on L forearm and over weeks progressed to involve R arm. At one point had rash on face as well, which improved significantly with doxycycline 100mg x 1 mo. Pt notes that when rash began to progress, she sought outpatient evaluation at [facility] dermatology and had a biopsy which showed spongiotic dermatitis; she was told she had a contact reaction and to change her skin products to fragrance free (now using All free and clear detergent, dove soap, and aquaphor and eucerin to moisturize); has not seen improvement in rash since changing these products. Lives with sister and has cat at home; sister is not itchy. Has tried several prescription topical steroids including Hydrocortisone 2.5% cream, fluocinonide ointment, and triamcinolone 01.% ointment, none of which have stopped progression of the rash. Has also been trying Triamcinolone on the face bumps.    Presented for evaluation due to continued progression which worsened approx 1 week ago.     Dyes her hair every few weeks-months, uses same dye product consistently. Underwent patch testing at an outside allergy practice, recalls being told she has sensitivity to [place] and unsure of the other 2 allergens. No new medications over past few months. Does not take supplements.    No recent travel. Denies spending much time outdoors over past few weeks/months.     Denies fever/chills, weight loss, night sweats, swollen lymph nodes, shortness of breath, chest pain, abdominal pain, n/v/d, leg swelling.     Of note, pt was seen by Dermatology during [facility] admission in late October 2023, noted to have diffuse rash in the setting of fevers, hypotension, elevated inflammatory markers. Biopsy on 10/30 was consistent with dermal hypersensitivity reaction, possibly 2/2 viral process. Extensive infectious workup at that time was negative.      ---------------------------------------------------    PAST MEDICAL & SURGICAL HISTORY:  HLD (hyperlipidemia)  DM (diabetes mellitus)  No significant past surgical history          REVIEW OF SYSTEMS:  CONSTITUTIONAL: No weakness, fevers or chills  EYES/ENT: No visual changes;  No vertigo or throat pain   NECK: No pain or stiffness  RESPIRATORY: No cough, wheezing, hemoptysis; No shortness of breath  CARDIOVASCULAR: No chest pain or palpitations  GASTROINTESTINAL: No abdominal. No nausea, vomiting, or hematemesis; No diarrhea or constipation. No melena or hematochezia.  GENITOURINARY: No dysuria, increased frequency or hematuria  MSK: No joint pain, swelling, or stiffness  NEUROLOGICAL: No numbness or weakness  SKIN: as per HPI      MEDICATIONS  (STANDING):  atorvastatin 40 milliGRAM(s) Oral at bedtime  dextrose 5%. 1000 milliLiter(s) (100 mL/Hr) IV Continuous <Continuous>  dextrose 5%. 1000 milliLiter(s) (50 mL/Hr) IV Continuous <Continuous>  dextrose 50% Injectable 25 Gram(s) IV Push once  dextrose 50% Injectable 12.5 Gram(s) IV Push once  dextrose 50% Injectable 25 Gram(s) IV Push once  gabapentin 300 milliGRAM(s) Oral three times a day  glucagon  Injectable 1 milliGRAM(s) IntraMuscular once  insulin lispro (ADMELOG) corrective regimen sliding scale   SubCutaneous three times a day before meals  lisinopril 20 milliGRAM(s) Oral daily    MEDICATIONS  (PRN):  dextrose Oral Gel 15 Gram(s) Oral once PRN Blood Glucose LESS THAN 70 milliGRAM(s)/deciliter      Allergies    penicillins (Swelling)    Intolerances        Social History:  Does not use tobacco products, consume alcohol or partake in illicit drug use  Lives with sister at home. Has a pet cat x years    FAMILY HISTORY:  FH: type 2 diabetes (Father, Mother, Sibling)        Vital Signs Last 24 Hrs  T(C): 36.6 (18 Mar 2024 17:20), Max: 36.8 (18 Mar 2024 05:36)  T(F): 97.8 (18 Mar 2024 17:20), Max: 98.2 (18 Mar 2024 05:36)  HR: 97 (18 Mar 2024 17:20) (92 - 131)  BP: 134/89 (18 Mar 2024 17:20) (113/84 - 136/86)  BP(mean): 104 (18 Mar 2024 17:20) (95 - 104)  RR: 18 (18 Mar 2024 17:20) (16 - 18)  SpO2: 98% (18 Mar 2024 17:20) (96% - 100%)    Parameters below as of 18 Mar 2024 17:20  Patient On (Oxygen Delivery Method): room air        PHYSICAL EXAM:   The patient was alert and oriented and in no apparent distress.  There was no visible lymphadenopathy.  Conjunctiva were non injected  There was no clubbing or edema of extremities.  Skin: The scalp/hair, head/face, conjunctivae/lids, lips/teeth/gums, neck, digits/nails, right and left axilla, right and left upper and lower extremities, chest, abdomen, back, buttocks and groin area. No bromhidrosis or hyperhidrosis.    Of note on skin exam:   edematous pink papules and papules coalescing into lichenified plaques on bilateral upper extremities, scattered papules on dorsal hands, central chest and neck, abdomen, upper cutaneous lip and cheeks  Occipital scalp and posterior neck with circumscribed pink scaly plaque    LABS:                        12.0   9.32  )-----------( 316      ( 18 Mar 2024 01:31 )             37.4     CBC Full  -  ( 18 Mar 2024 01:31 )  WBC Count : 9.32 K/uL  RBC Count : 4.67 M/uL  Hemoglobin : 12.0 g/dL  Hematocrit : 37.4 %  Platelet Count - Automated : 316 K/uL  Mean Cell Volume : 80.1 fl  Mean Cell Hemoglobin : 25.7 pg  Mean Cell Hemoglobin Concentration : 32.1 gm/dL  Auto Neutrophil # : 6.29 K/uL  Auto Lymphocyte # : 1.91 K/uL  Auto Monocyte # : 0.79 K/uL  Auto Eosinophil # : 0.24 K/uL  Auto Basophil # : 0.04 K/uL  Auto Neutrophil % : 67.5 %  Auto Lymphocyte % : 20.5 %  Auto Monocyte % : 8.5 %  Auto Eosinophil % : 2.6 %  Auto Basophil % : 0.4 %    03-18    137  |  101  |  15  ----------------------------<  162<H>  4.1   |  20<L>  |  0.63    Ca    9.8      18 Mar 2024 01:31    TPro  8.2  /  Alb  3.8  /  TBili  0.2  /  DBili  x   /  AST  26  /  ALT  44  /  AlkPhos  180<H>  03-18    PT/INR - ( 18 Mar 2024 01:31 )   PT: 10.8 sec;   INR: 0.98 ratio         PTT - ( 18 Mar 2024 01:31 )  PTT:31.6 sec  Urinalysis Basic - ( 18 Mar 2024 01:31 )    Color: x / Appearance: x / SG: x / pH: x  Gluc: 162 mg/dL / Ketone: x  / Bili: x / Urobili: x   Blood: x / Protein: x / Nitrite: x   Leuk Esterase: x / RBC: x / WBC x   Sq Epi: x / Non Sq Epi: x / Bacteria: x    C-Reactive Protein, Serum (03.18.24 @ 01:31)    C-Reactive Protein, Serum: 17 mg/L    Sedimentation Rate, Erythrocyte (03.18.24 @ 01:31)    Sedimentation Rate, Erythrocyte: 120 mm/hr          RADIOLOGY & ADDITIONAL STUDIES:

## 2024-03-18 NOTE — ED ADULT NURSE NOTE - HIV OFFER
Attending  Note:  Baby Girl Katie Warren   is now 6-day old This  female born on 2020   was a former Gestational Age: 29w2d, with  corrected gestational age of 38w 3d. Chief Complaint: Prematurity, IDM- hypoglycemia due to hyperinsulinism, ineffective feeding pattern. Jaundice, oxygen desaturations    HPI:  Stable on RA with 0 apneas, 0  bradys, 1 desaturations documented in the last 24 hrs- last  stim on . Tolerating full feeds of Sim Sensitive 24 moi/oz ad dafne feeds. In open crib  BS- 61-85 in the last 24 hrs. Off the IV X 24 hrs     Percent weight change since birth: 4%    Infant was seen and discussed with NNP and last 24h of vitals, events, labs were  reviewed .      Continues on: Scheduled Meds:    Continuous Infusions:    PRN Meds:.zinc oxide  IV access: none  Feeding readiness score: 2 ; Feeding quality: 2  PO/NG: took 100 % feeds by mouth in the last 24 hours~ 112 ml/kg po  Pertinent labs:   Lab Results   Component Value Date    HGB 20.2 2021    HCT 58.3 2021     Reticulocyte Count:  No results found for: IRF, RETICPCT  Bilirubin:   Lab Results   Component Value Date    ALKPHOS 119 2021    ALT 13 2021    AST 84 2021    PROT 2021    BILITOT 2021    BILIDIR 2021    IBILI 2021    LABALBU 2021     BMP:  K- 7.8- hemolyzed specimen  Lab Results   Component Value Date     2021    K 2021     2021    CO2021    BUN 2 2021    LABALBU 2021    CREATININE <2021    CALCIUM 2021    GFRAA CANNOT BE CALCULATED 2021    LABGLOM CANNOT BE CALCULATED 2021    GLUCOSE 68 2021       Immunization:   Immunization History   Administered Date(s) Administered    Hepatitis B Ped/Adol (Engerix-B, Recombivax HB) 2021         Exam -   Weight: 2340 g Weight change: 25 g  General: Alert, active, in no distress  Skin: Pink, minimally icteric, acyanotic  Chest: B/L clear & equal air exchange, no retractions  Heart: Regular rate & rhythm, no murmur, brisk cap refill  Abdomen: Soft, non-tender, non- distended, no masses with active bowel sounds  CNS: AF soft and flat, No focal deficit, tone appropriate for ga    Assessment/Plan:     Patient Active Problem List    Diagnosis Date Noted     infant, 2,000-2,499 grams 2021     See Ga Dx      Inadequate oral intake 2021     Infant admitted to NICU for IUGR and hypoglycemia. History of D10 bolus and low glucoses, improved with IV fluids and increased calories. Ad dafne oral feeding  currently receiving Similac Sensitive 24 moi or MBM. PO intake ~112 mL/kg/day within previous 24 hours. PLAN: Continue maternal breast milk w/ similac Sensitive 24 moi/oz. Aim for a min 120 ml/kg/day or 140 ml in a 12 hrs period. May gavage as needed. Encourage PO intake. Glucoses q6        IDM (infant of diabetic mother) 2021     See hypoglycemia problem       Hypoglycemia in infant 2021     Imp: Secondary to hyperinsulinemia. H/O Maternal Type II diabetes. Hypoglycemic on admission, glucose <10, given IV D10 bolus and infusion started. glucose screens improved, IV rate weaned then glucose dropped again necessitating increase of IV rate. Continues on feeds of Sim sensitive 24 moi , PO ~112 mL/kg/day. Glucose have been 61-85 in last 24 hours. Critical labs sent, BHB 0.19, Insulin 6.1, Cortisol 9.4, growth hormone 23.40. Plan: Continue  glucose screens  q6 if 2 >60 mg/dL change to q12. Due to loose stool, was changed to Similac Sensitive 24 moi/oz and monitor tolerance - ad dafne amount. Continue maternal breast milk with Similac Sensitive  24 moi Continue ac glucose screens Q6hr.  Jaundice of  2021 bili - 5.81.  9.8.  was 11.2 and  is 10.88  Plan: Monitor bili clinically as is trending down and below light level.       Oxygen desaturation 2021     Infant had desaturations to 84-89% on 6/10, NC 1 LPM, 21% oxygen initiated. Currently in RA and had 1D self limiting  overnight. Last stim x1 on , nasal cannula discontinued . Plan: Monitor for desaturation events. Will need to be stim free before discharge      Premature infant of 35 weeks gestation 2021     Imp: Born by scheduled repeat at 35 4/7 d/t maternal cHTN, IUGR and abnormal dopplers. Breech delivery.  echo normal with PFO tiny PDA, negative Ortolani and Bass maneuvers on exam. Hep B given. Plan: monitor infant for ability to PO feed adequate volumes, maintain temperature and blood glucose levels. NBS sent . Will need hearing screen, CST and PCP appointment PTD, hip US at 10weeks of age        Clinton Loser  affected by breech delivery and extraction 2021     Delivered breech by . Negative ortolani and bass maneuvers. Plan: will need hip ultrasound at 4-6 weeks                Projected hospital stay of approximately 2 more weeks, up to 40 weeks post-menstrual age. The medical necessity for inpatient hospital care is based on the above stated problem list and treatment modalities.      Electronically signed by Sunny Everett MD on 2021 at 1:59 PM [employer]

## 2024-03-18 NOTE — H&P ADULT - NSHPPHYSICALEXAM_GEN_ALL_CORE
PHYSICAL EXAM:  GENERAL: NAD, well-developed, comfortable  HEAD:  Atraumatic, Normocephalic  EYES: EOMI, PERRLA, conjunctiva and sclera clear  NECK: Supple, No JVD  CHEST/LUNG: Clear to auscultation bilaterally; No wheeze  HEART: Regular rate and rhythm; No murmurs, rubs, or gallops  ABDOMEN: Soft, Nontender, Nondistended; Bowel sounds present  NEURO: AAOx3, no focal weakness, 5/5 b/l extremity strength, b/l knee no arthritis, no effusion   EXTREMITIES:  2+ Peripheral Pulses, No clubbing, cyanosis, or edema  SKIN: + raised rash throughout entire upper body

## 2024-03-18 NOTE — ED PROVIDER NOTE - PROGRESS NOTE DETAILS
Sy PGY1: patient persistently tachycardic in the 130s despite 2L of fluid, no evidence of infection or endocrine abnormality as cause. TBA

## 2024-03-18 NOTE — ED ADULT NURSE REASSESSMENT NOTE - NS ED NURSE REASSESS COMMENT FT1
PT states that her rash is very painful. PT states "I came here for relief and I'm not getting any relief. They gave me Tylenol and Benadryl with no relief." RN reached out to ANGELIKA Griffin via TEAMS message regarding PT's request for additional medication. Awaiting reply.

## 2024-03-18 NOTE — CONSULT NOTE ADULT - ASSESSMENT
ASSESSMENT/PLAN:     # Pruritic rash  - Ddx is broad, including contact dermatitis with id reaction vs. polymorphic light eruption vs. drug exanthem vs. connective tissue disease  - Start clobetasol 0.05% ointment BID to affected areas on BODY (Avoid face, groin, skin folds) PRN itch for up to 2 weeks at a time, with at least 1 week break before resuming as needed  - Follow-up punch biopsy from L upper arm performed today, 3/18. Wound care with daily vaseline and band-aid placement  - Please send the following labs for work up of other possible underlying etiologies of systemic inflammation with cutaneous eruption: CARTER, rheumatoid factor, HIV, acute hepatitis panel, SPEP, UPEP, serum light chains, urine light chains  - Consider Rheumatology consult      Thank you for this consult. Patient was seen and reviewed with attending dermatologist Dr. Hickman    Dermatology will continue to follow. Please page [phone] with a 10-digit call-back number for further related questions.    Deanne Rivas MD  Resident Physician, PGY-2   [facility]   Pager: [phone]

## 2024-03-18 NOTE — ED ADULT NURSE NOTE - OBJECTIVE STATEMENT
63y F presenting with itchy, burning rash covering arms legs and back since Nov. pt stats she has been to several docs and was told she had contact dermatitis as per as skin biopsy however, has not had relief from rash with treatments thus far. of note, pt has dark red rasied bumps on b/l lower ext and back. pts is also tachacardiac on cardiac monitor (HR in 130s). pt states she has hepatitis and believes rash may be r/t issues with hepatic function.

## 2024-03-18 NOTE — ED PROVIDER NOTE - ATTENDING CONTRIBUTION TO CARE
I, Lobito Adam MD, Emergency Medicine Attending Physician, personally saw and examined the patient and I personally made/approve the management plan and take responsibility for the patient management.    MDM: 63-year-old female with history of hyperlipidemia, diabetes who presents with worsening of her rash for the last 2 months despite use of topical steroid creams, antihistamine, doxycycline and tacrolimus, and evaluation by multiple specialists including dermatology, allergist, ID.    ROS: Denies fevers, chills, chest pain, shortness of breath, abdominal pain, nausea, vomiting, diaphoresis, dizziness, syncope, leg swelling    On examination, patient with elevated heart rate but otherwise stable vitals, no acute distress. Cardiac examination with tachycardia but regular rhythm, lungs CTAB with no W/R/R, abdomen soft and nontender, nondistended, no peritoneal signs, neurovascularly intact in all 4 extremities.  There is no calf tenderness or leg swelling. Negative Amelia's sign.  Skin examination shows diffuse rash including areas of confluence and skin thickening especially over the bilateral arms, and has macular rash noted diffusely.  There is no desquamation, petechiae, mucosal involvement.    Will obtain labs to evaluate for hematologic disorder, metabolic derangements, hepatic and renal function, and screen for infection.  Will keep patient on cardiac monitor, obtain EKG and troponin to evaluate for possible cardiac abnormality including ACS and arrhythmia given patient with significant tachycardia.  Will give IV fluids.  However patient asymptomatic without any cardiopulmonary symptoms or orthostasis.  No PE risk factors.    My independent interpretation of the EKG shows:  Sinus tachycardia with rate of 126 bpm, normal intervals, mild ST elevation but does not meet criteria for STEMI.    Labs reviewed, patient with elevated ESR of 120, no leukocytosis, electrolytes nonactionable, CRP 17.    My independent interpretation of the chest x-ray images shows no focal consolidation.   More details to be seen in the official radiologist read. I, Lobito Adam MD, Emergency Medicine Attending Physician, personally saw and examined the patient and I personally made/approve the management plan and take responsibility for the patient management.    MDM: 63-year-old female with history of hyperlipidemia, diabetes who presents with worsening of her rash for the last 2 months despite use of topical steroid creams, antihistamine, doxycycline and tacrolimus, and evaluation by multiple specialists including dermatology, allergist, ID.    ROS: Denies fevers, chills, chest pain, shortness of breath, abdominal pain, nausea, vomiting, diaphoresis, dizziness, syncope, leg swelling    On examination, patient with elevated heart rate but otherwise stable vitals, no acute distress. Cardiac examination with tachycardia but regular rhythm, lungs CTAB with no W/R/R, abdomen soft and nontender, nondistended, no peritoneal signs, neurovascularly intact in all 4 extremities.  There is no calf tenderness or leg swelling. Negative Amelia's sign.  Skin examination shows diffuse rash including areas of confluence and skin thickening especially over the bilateral arms, and has macular rash noted diffusely.  There is no desquamation, petechiae, mucosal involvement.    Will obtain labs to evaluate for hematologic disorder, metabolic derangements, hepatic and renal function, and screen for infection.  Will keep patient on cardiac monitor, obtain EKG and troponin to evaluate for possible cardiac abnormality including ACS and arrhythmia given patient with significant tachycardia.  Will give IV fluids.  However patient asymptomatic without any cardiopulmonary symptoms or orthostasis.  No PE risk factors.    My independent interpretation of the EKG shows:  Sinus tachycardia with rate of 126 bpm, normal intervals, mild ST elevation but does not meet criteria for STEMI.    Labs reviewed, patient with elevated ESR of 120, no leukocytosis, electrolytes nonactionable, CRP 17.    My independent interpretation of the chest x-ray images shows no focal consolidation.   More details to be seen in the official radiologist read.    The patient will need to be admitted to the hospital for continued evaluation and management.  Discussed with the accepting physician regarding the initial presentation, diagnostic studies, treatments given in the ED, and current plan of care.  I, Dr. Lobito Adam, spoke directly to the admitting attending physician, Dr. Mikal Patricio, who accepted the patient to their service.  The patient was accepted by and endorsed to the medicine team.

## 2024-03-18 NOTE — ED PROVIDER NOTE - PHYSICAL EXAMINATION
Gen: well appearing, in no acute distress   Head: normal appearing  HEENT: normal conjunctiva, oral mucosa moist, vision grossly intact   Lung: no respiratory distress, speaking in full sentences, CTA b/l, no wheeze, crackles or rhonchi   CV: regular rate and rhythm, no murmurs  Abd: soft, non distended, non tender   MSK: no visible deformities, ambulating without difficulty   Neuro: No focal deficits, AAOx3  Skin: Warm, + diffuse erythematous, confluent raised rash with overlying excoriation marks to LORA UE, chest, abdomen, back and face   Psych: normal affect

## 2024-03-19 LAB
ALBUMIN SERPL ELPH-MCNC: 4.2 G/DL — SIGNIFICANT CHANGE UP (ref 3.3–5)
ALP SERPL-CCNC: 181 U/L — HIGH (ref 40–120)
ALT FLD-CCNC: 37 U/L — SIGNIFICANT CHANGE UP (ref 10–45)
ANION GAP SERPL CALC-SCNC: 15 MMOL/L — SIGNIFICANT CHANGE UP (ref 5–17)
AST SERPL-CCNC: 18 U/L — SIGNIFICANT CHANGE UP (ref 10–40)
BASOPHILS # BLD AUTO: 0.03 K/UL — SIGNIFICANT CHANGE UP (ref 0–0.2)
BASOPHILS NFR BLD AUTO: 0.4 % — SIGNIFICANT CHANGE UP (ref 0–2)
BILIRUB SERPL-MCNC: 0.2 MG/DL — SIGNIFICANT CHANGE UP (ref 0.2–1.2)
BUN SERPL-MCNC: 19 MG/DL — SIGNIFICANT CHANGE UP (ref 7–23)
CALCIUM SERPL-MCNC: 10 MG/DL — SIGNIFICANT CHANGE UP (ref 8.4–10.5)
CHLORIDE SERPL-SCNC: 101 MMOL/L — SIGNIFICANT CHANGE UP (ref 96–108)
CO2 SERPL-SCNC: 23 MMOL/L — SIGNIFICANT CHANGE UP (ref 22–31)
CREAT SERPL-MCNC: 1.17 MG/DL — SIGNIFICANT CHANGE UP (ref 0.5–1.3)
EGFR: 53 ML/MIN/1.73M2 — LOW
EOSINOPHIL # BLD AUTO: 0.17 K/UL — SIGNIFICANT CHANGE UP (ref 0–0.5)
EOSINOPHIL NFR BLD AUTO: 2.1 % — SIGNIFICANT CHANGE UP (ref 0–6)
GLUCOSE BLDC GLUCOMTR-MCNC: 166 MG/DL — HIGH (ref 70–99)
GLUCOSE BLDC GLUCOMTR-MCNC: 176 MG/DL — HIGH (ref 70–99)
GLUCOSE BLDC GLUCOMTR-MCNC: 206 MG/DL — HIGH (ref 70–99)
GLUCOSE BLDC GLUCOMTR-MCNC: 218 MG/DL — HIGH (ref 70–99)
GLUCOSE SERPL-MCNC: 194 MG/DL — HIGH (ref 70–99)
HCT VFR BLD CALC: 38.1 % — SIGNIFICANT CHANGE UP (ref 34.5–45)
HGB BLD-MCNC: 12 G/DL — SIGNIFICANT CHANGE UP (ref 11.5–15.5)
IMM GRANULOCYTES NFR BLD AUTO: 0.5 % — SIGNIFICANT CHANGE UP (ref 0–0.9)
LDH SERPL L TO P-CCNC: 209 U/L — SIGNIFICANT CHANGE UP (ref 50–242)
LYMPHOCYTES # BLD AUTO: 1.87 K/UL — SIGNIFICANT CHANGE UP (ref 1–3.3)
LYMPHOCYTES # BLD AUTO: 22.8 % — SIGNIFICANT CHANGE UP (ref 13–44)
MCHC RBC-ENTMCNC: 25.8 PG — LOW (ref 27–34)
MCHC RBC-ENTMCNC: 31.5 GM/DL — LOW (ref 32–36)
MCV RBC AUTO: 81.8 FL — SIGNIFICANT CHANGE UP (ref 80–100)
MONOCYTES # BLD AUTO: 0.8 K/UL — SIGNIFICANT CHANGE UP (ref 0–0.9)
MONOCYTES NFR BLD AUTO: 9.7 % — SIGNIFICANT CHANGE UP (ref 2–14)
NEUTROPHILS # BLD AUTO: 5.3 K/UL — SIGNIFICANT CHANGE UP (ref 1.8–7.4)
NEUTROPHILS NFR BLD AUTO: 64.5 % — SIGNIFICANT CHANGE UP (ref 43–77)
NRBC # BLD: 0 /100 WBCS — SIGNIFICANT CHANGE UP (ref 0–0)
PLATELET # BLD AUTO: 336 K/UL — SIGNIFICANT CHANGE UP (ref 150–400)
POTASSIUM SERPL-MCNC: 4.8 MMOL/L — SIGNIFICANT CHANGE UP (ref 3.5–5.3)
POTASSIUM SERPL-SCNC: 4.8 MMOL/L — SIGNIFICANT CHANGE UP (ref 3.5–5.3)
PROT SERPL-MCNC: 8.7 G/DL — HIGH (ref 6–8.3)
RBC # BLD: 4.66 M/UL — SIGNIFICANT CHANGE UP (ref 3.8–5.2)
RBC # FLD: 13.6 % — SIGNIFICANT CHANGE UP (ref 10.3–14.5)
SODIUM SERPL-SCNC: 139 MMOL/L — SIGNIFICANT CHANGE UP (ref 135–145)
WBC # BLD: 8.21 K/UL — SIGNIFICANT CHANGE UP (ref 3.8–10.5)
WBC # FLD AUTO: 8.21 K/UL — SIGNIFICANT CHANGE UP (ref 3.8–10.5)

## 2024-03-19 PROCEDURE — 99233 SBSQ HOSP IP/OBS HIGH 50: CPT

## 2024-03-19 PROCEDURE — 99232 SBSQ HOSP IP/OBS MODERATE 35: CPT | Mod: GC

## 2024-03-19 RX ORDER — DIPHENHYDRAMINE HCL 50 MG
25 CAPSULE ORAL ONCE
Refills: 0 | Status: COMPLETED | OUTPATIENT
Start: 2024-03-19 | End: 2024-03-19

## 2024-03-19 RX ORDER — INFLUENZA VIRUS VACCINE 15; 15; 15; 15 UG/.5ML; UG/.5ML; UG/.5ML; UG/.5ML
0.5 SUSPENSION INTRAMUSCULAR ONCE
Refills: 0 | Status: DISCONTINUED | OUTPATIENT
Start: 2024-03-19 | End: 2024-03-22

## 2024-03-19 RX ADMIN — GABAPENTIN 300 MILLIGRAM(S): 400 CAPSULE ORAL at 05:26

## 2024-03-19 RX ADMIN — Medication 2: at 11:32

## 2024-03-19 RX ADMIN — ATORVASTATIN CALCIUM 40 MILLIGRAM(S): 80 TABLET, FILM COATED ORAL at 21:09

## 2024-03-19 RX ADMIN — Medication 1: at 18:06

## 2024-03-19 RX ADMIN — GABAPENTIN 300 MILLIGRAM(S): 400 CAPSULE ORAL at 21:09

## 2024-03-19 RX ADMIN — Medication 1 APPLICATION(S): at 18:24

## 2024-03-19 RX ADMIN — Medication 1 APPLICATION(S): at 05:26

## 2024-03-19 RX ADMIN — LISINOPRIL 20 MILLIGRAM(S): 2.5 TABLET ORAL at 05:26

## 2024-03-19 RX ADMIN — Medication 1: at 07:30

## 2024-03-19 RX ADMIN — GABAPENTIN 300 MILLIGRAM(S): 400 CAPSULE ORAL at 13:54

## 2024-03-19 RX ADMIN — Medication 25 MILLIGRAM(S): at 21:08

## 2024-03-19 NOTE — CONSULT NOTE ADULT - ASSESSMENT
63 F with PMH of T2DM, HLD, thyroid nodule, palpitations, ZIYAD/BSO, and SBO 2/2 adhesions presenting with acute worsening of chronic rash that first appeared on the forearms and has since spread up the arms to her torso, face, and posterior neck. Rheumatology consulted for further evaluation. Patient also noted to have tachycardia to 110s, elevated inflammatory markers, mildly elevated alk phos. Differential broad, biopsy and further lab results pending.     #Rash  -Patient not endorsing any fevers/chills, weight loss, night sweats, joint pain or swelling, eye pain or redness, oral ulcers, vision changes, urinary changes, or other symptoms outside of spread of rash and itch/pain  -Agree with CARTER, RF, HIV, hepatitis, SPEP/UPEP, serum and urine light chains  -Continue topical steroids for symptoms per dermatology  -Await skin biopsy results     63 F with PMH of T2DM, HLD, thyroid nodule, palpitations, ZIYAD/BSO, and SBO 2/2 adhesions presenting with acute worsening of chronic rash that first appeared on the forearms and has since spread up the arms to her torso, face, and posterior neck. Rheumatology consulted for further evaluation. Patient also noted to have tachycardia to 110s, elevated inflammatory markers, elevated alk phos. Differential broad, biopsy and further lab results pending.     #Rash  -Patient not endorsing any fevers/chills, weight loss, night sweats, joint pain or swelling, eye pain or redness, oral ulcers, vision changes, urinary changes, or other symptoms outside of spread of rash and itch/pain  -Unclear why elevated inflammatory markers  -Agree with CARTER, RF, HIV, hepatitis, SPEP/UPEP, serum and urine light chains  -Continue topical steroids for symptoms per dermatology  -Await skin biopsy results     63 F with PMH of T2DM, HLD, thyroid nodule, palpitations, ZIYAD/ BSO, and SBO 2/2 adhesions presenting with acute worsening of chronic rash that first appeared on the forearms and has since spread up the arms to her torso, face, and posterior neck. Patient also noted to have tachycardia to 110s, elevated inflammatory markers, elevated alk phos.   Rheumatology consulted for further evaluation to r/o CTD    # Rash - maculopapular extensive erythroderma/ pruritic  - the distribution of rash is suggestive of DM - biopsy is essential for establishing the dx  -Patient not endorsing any fevers/chills, weight loss, night sweats, joint pain or swelling, eye pain or redness, oral ulcers, vision changes, urinary changes, or other symptoms outside of spread of rash and itch/pain  # elevated inflammatory markers- r/o underlying CTD      - FU CARTER, RF, HIV, hepatitis, SPEP/ UPEP, serum and urine light chains  - Continue topical steroids for symptoms per dermatology  - TX course will be determined after the dx is established

## 2024-03-19 NOTE — PATIENT PROFILE ADULT - FALL HARM RISK - UNIVERSAL INTERVENTIONS
Bed in lowest position, wheels locked, appropriate side rails in place/Call bell, personal items and telephone in reach/Instruct patient to call for assistance before getting out of bed or chair/Non-slip footwear when patient is out of bed/[place] to call system/Physically safe environment - no spills, clutter or unnecessary equipment/Purposeful Proactive Rounding/Room/bathroom lighting operational, light cord in reach

## 2024-03-19 NOTE — CONSULT NOTE ADULT - ASSESSMENT
The patient is a  63F with a PMH of DM, presenting for a rash. GI consulted for abnormal ALP.    1. Rash   s/p repeat biopsy with derm on 3/18, path pending     2. Elevated inflammatory markers  CRP 17,   Rheumatology consulted     3. Abnormal ALP  Outpatient hepatology workup reviewed; ALP ~ 290, elevated . ALP today is 180.  CARTER, SMA, AMA, A1AT, iron panel, TSH, Celiac panel, CK, hepatitis A/B/C serologies - all negative     - abdominal US w/ duplex ordered   - pending US results, will decide on need for MRCP. pt has severe claustrophobia and would need anxiolytics prior to MRI.      I had a prolonged conversation with the patient regarding the hospital course, differential diagnosis, results of diagnostic tests this far, and therapeutic modalities available. Plan of care discussed with the patient after the evaluation. Patient expresses a clear understanding of the plan of care. Fifty minutes spent on the total encounter, of which more than fifty percent of the encounter was spent on counseling and/or coordinating care by the attending physician. Advanced care planning forms were discussed. Code status including forceful chest compressions, defibrillation and intubation were discussed. The risks benefits and alternatives to pertinent gastrointestinal procedures and interventions were discussed in detail and all questions were answered. Duration: 15 Minutes.      Hank Storm D.O.  Gastroenterology and Hepatology  [address] [place] [address], [address]  [place], [place]  Office: [phone]

## 2024-03-19 NOTE — PROGRESS NOTE ADULT - SUBJECTIVE AND OBJECTIVE BOX
SUBJECTIVE / OVERNIGHT EVENTS:      Patient seen and examined at bedside in ED [place]. Resting comfortably in the chair eating breakfast.       --------------------------------------------------------------------------------------------  LABS:                        12.0   9.32  )-----------( 316      ( 18 Mar 2024 01:31 )             37.4     03-18    137  |  101  |  15  ----------------------------<  162<H>  4.1   |  20<L>  |  0.63    Ca    9.8      18 Mar 2024 01:31    TPro  8.2  /  Alb  3.8  /  TBili  0.2  /  DBili  x   /  AST  26  /  ALT  44  /  AlkPhos  180<H>  03-18    PT/INR - ( 18 Mar 2024 01:31 )   PT: 10.8 sec;   INR: 0.98 ratio         PTT - ( 18 Mar 2024 01:31 )  PTT:31.6 sec  CAPILLARY BLOOD GLUCOSE      POCT Blood Glucose.: 176 mg/dL (19 Mar 2024 07:17)  POCT Blood Glucose.: 206 mg/dL (18 Mar 2024 21:31)  POCT Blood Glucose.: 155 mg/dL (18 Mar 2024 15:28)  POCT Blood Glucose.: 143 mg/dL (18 Mar 2024 11:34)        Urinalysis Basic - ( 18 Mar 2024 01:31 )    Color: x / Appearance: x / SG: x / pH: x  Gluc: 162 mg/dL / Ketone: x  / Bili: x / Urobili: x   Blood: x / Protein: x / Nitrite: x   Leuk Esterase: x / RBC: x / WBC x   Sq Epi: x / Non Sq Epi: x / Bacteria: x        RADIOLOGY & ADDITIONAL TESTS:     Imaging Personally Reviewed:  [x] YES  [ ] NO    Consultant(s) Notes Reviewed:  [x] YES  [ ] NO    MEDICATIONS  (STANDING):  atorvastatin 40 milliGRAM(s) Oral at bedtime  clobetasol 0.05% Ointment 1 Application(s) Topical two times a day  dextrose 5%. 1000 milliLiter(s) (50 mL/Hr) IV Continuous <Continuous>  dextrose 5%. 1000 milliLiter(s) (100 mL/Hr) IV Continuous <Continuous>  dextrose 50% Injectable 25 Gram(s) IV Push once  dextrose 50% Injectable 12.5 Gram(s) IV Push once  dextrose 50% Injectable 25 Gram(s) IV Push once  gabapentin 300 milliGRAM(s) Oral three times a day  glucagon  Injectable 1 milliGRAM(s) IntraMuscular once  insulin lispro (ADMELOG) corrective regimen sliding scale   SubCutaneous three times a day before meals  lisinopril 20 milliGRAM(s) Oral daily    MEDICATIONS  (PRN):  dextrose Oral Gel 15 Gram(s) Oral once PRN Blood Glucose LESS THAN 70 milliGRAM(s)/deciliter      Care Discussed with Consultants/Other Providers [x] YES  [ ] NO    Vital Signs Last 24 Hrs  T(C): 37.2 (19 Mar 2024 05:13), Max: 37.2 (19 Mar 2024 05:13)  T(F): 99 (19 Mar 2024 05:13), Max: 99 (19 Mar 2024 05:13)  HR: 116 (19 Mar 2024 05:13) (92 - 116)  BP: 127/83 (19 Mar 2024 05:13) (127/83 - 139/86)  BP(mean): 104 (18 Mar 2024 17:20) (96 - 104)  RR: 18 (19 Mar 2024 05:13) (18 - 18)  SpO2: 95% (19 Mar 2024 05:13) (95% - 100%)    Parameters below as of 19 Mar 2024 05:13  Patient On (Oxygen Delivery Method): room air      I&O's Summary       PHYSICAL EXAM:  GENERAL: NAD, well-developed, comfortable  HEAD:  Atraumatic, Normocephalic  EYES: EOMI, PERRLA, conjunctiva and sclera clear  NECK: Supple, No JVD  CHEST/LUNG: Clear to auscultation bilaterally; No wheeze  HEART: Regular rate and rhythm; No murmurs, rubs, or gallops  ABDOMEN: Soft, Nontender, Nondistended; Bowel sounds present  NEURO: AAOx3, no focal weakness, 5/5 b/l extremity strength, b/l knee no arthritis, no effusion   EXTREMITIES:  2+ Peripheral Pulses, No clubbing, cyanosis, or edema  SKIN: + raised rash throughout entire upper body

## 2024-03-19 NOTE — CONSULT NOTE ADULT - SUBJECTIVE AND OBJECTIVE BOX
[facility]AT[facility]Y/ONCOLOGY CONSULT NOTE     HPI:  Ms. Reynolds is a 63y Female with PMHx of     ROS: pertinent positives and negatives as per HPI    Allergies: penicillins (Swelling)    PMHx:  HLD (hyperlipidemia)  DM (diabetes mellitus)  H/O sinus tachycardia    SurgHx:   No significant past surgical history    FHx:   type 2 diabetes (Father, Mother, Sibling)    SocHx:     Meds:   MEDICATIONS  (STANDING):  atorvastatin 40 milliGRAM(s) Oral at bedtime  clobetasol 0.05% Ointment 1 Application(s) Topical two times a day  dextrose 5%. 1000 milliLiter(s) (50 mL/Hr) IV Continuous <Continuous>  dextrose 5%. 1000 milliLiter(s) (100 mL/Hr) IV Continuous <Continuous>  dextrose 50% Injectable 25 Gram(s) IV Push once  dextrose 50% Injectable 12.5 Gram(s) IV Push once  dextrose 50% Injectable 25 Gram(s) IV Push once  gabapentin 300 milliGRAM(s) Oral three times a day  glucagon  Injectable 1 milliGRAM(s) IntraMuscular once  insulin lispro (ADMELOG) corrective regimen sliding scale   SubCutaneous three times a day before meals  lisinopril 20 milliGRAM(s) Oral daily    MEDICATIONS  (PRN):  dextrose Oral Gel 15 Gram(s) Oral once PRN Blood Glucose LESS THAN 70 milliGRAM(s)/deciliter    Vital Signs  T(C): 37.2 (03-19-24 @ 05:13), Max: 37.2 (03-19-24 @ 05:13)  T(F): 99 (03-19-24 @ 05:13), Max: 99 (03-19-24 @ 05:13)  HR: 116 (03-19-24 @ 05:13) (92 - 116)  BP: 127/83 (03-19-24 @ 05:13) (113/84 - 139/86)  RR: 18 (03-19-24 @ 05:13) (18 - 18)  SpO2: 95% (03-19-24 @ 05:13) (95% - 100%)    Physical Exam:  Gen:   HEENT:   Chest:   Cardiac:  Abd:   Ext:   Neuro:   Integument:     Labs:  CBC Full  -  ( 18 Mar 2024 01:31 )  WBC Count : 9.32 K/uL  RBC Count : 4.67 M/uL  Hemoglobin : 12.0 g/dL  Hematocrit : 37.4 %  Platelet Count - Automated : 316 K/uL  Mean Cell Volume : 80.1 fl  Mean Cell Hemoglobin : 25.7 pg  Mean Cell Hemoglobin Concentration : 32.1 gm/dL  Auto Neutrophil # : 6.29 K/uL  Auto Lymphocyte # : 1.91 K/uL  Auto Monocyte # : 0.79 K/uL  Auto Eosinophil # : 0.24 K/uL  Auto Basophil # : 0.04 K/uL  Auto Neutrophil % : 67.5 %  Auto Lymphocyte % : 20.5 %  Auto Monocyte % : 8.5 %  Auto Eosinophil % : 2.6 %  Auto Basophil % : 0.4 %    03-18    137  |  101  |  15  ----------------------------<  162<H>  4.1   |  20<L>  |  0.63    Ca    9.8      18 Mar 2024 01:31    TPro  8.2  /  Alb  3.8  /  TBili  0.2  /  DBili  x   /  AST  26  /  ALT  44  /  AlkPhos  180<H>  03-18    PT/INR - ( 18 Mar 2024 01:31 )   PT: 10.8 sec;   INR: 0.98 ratio         PTT - ( 18 Mar 2024 01:31 )  PTT:31.6 sec   [facility]AT[facility]/ON[facility]OGY CONSULT NOTE     HPI:  Ms. Reynolds is a 63y Female with PMHx of lower back pain, Type II DM, HLD, GERD, hx of SBO due to adhesions, thyroid nodule, palpitations, ZIYAD/BSO who presented with acute worsening of diffuse upper body skin rash. She stated she first noted this rash 01/2024 begining on on her hands and eventually progressing up her arms, torso, face and posterior nape. She has seen dermatology in the past and biopsy showed signs of contact dermatitis. She stated she had improvement in the rash, particularly the facial component while she was on a 15 day course of Doxycycline BID in Feb 2024, and upon stopping, the rash has returned and is progressively worsening associated with pain, prutitis. She was being evaluated by GI recently who found elevated ALP to 217 with isoenzymes showing 17% for macrohepatic isoenzymes and therefore was concerning for cholestatic liver disease associated rash. She was recommended for US Abd but was not able to complete it and is now admitted here to [facility]. GI requesting continuation of workup with possible MRI/MCRP. Dermatology consulted and repeated biopsy inpatient.     Per chart and dermatology pt was seen by Dermatology during [facility] admission in late October 2023, noted to have diffuse rash in the setting of fevers, hypotension, elevated inflammatory markers. Biopsy on 10/30 was consistent with dermal hypersensitivity reaction, possibly 2/2 viral process. Extensive infectious workup at that time was negative.      She denied hx of smoking, occasional ETOH, denied illicit drug use. She denied family hx of autoimmune, blood disorders or cancer. She lives at home, she works in admin, no previous workplace exposures except for when she worked in an [employer] company in the 1980s. She previously had a colonoscopy which was normal.     ROS: pertinent positives and negatives as per HPI    Allergies: penicillins (Swelling)    PMHx:  HLD (hyperlipidemia)  DM (diabetes mellitus)  H/O sinus tachycardia    SurgHx:   No significant past surgical history    FHx:   Type 2 diabetes (Father, Mother, Sibling)    SocHx:   Denied smoking  Occasional ETOH  Denied illicit drug use  Lives at home    Meds:   MEDICATIONS  (STANDING):  atorvastatin 40 milliGRAM(s) Oral at bedtime  clobetasol 0.05% Ointment 1 Application(s) Topical two times a day  dextrose 5%. 1000 milliLiter(s) (50 mL/Hr) IV Continuous <Continuous>  dextrose 5%. 1000 milliLiter(s) (100 mL/Hr) IV Continuous <Continuous>  dextrose 50% Injectable 25 Gram(s) IV Push once  dextrose 50% Injectable 12.5 Gram(s) IV Push once  dextrose 50% Injectable 25 Gram(s) IV Push once  gabapentin 300 milliGRAM(s) Oral three times a day  glucagon  Injectable 1 milliGRAM(s) IntraMuscular once  insulin lispro (ADMELOG) corrective regimen sliding scale   SubCutaneous three times a day before meals  lisinopril 20 milliGRAM(s) Oral daily    MEDICATIONS  (PRN):  dextrose Oral Gel 15 Gram(s) Oral once PRN Blood Glucose LESS THAN 70 milliGRAM(s)/deciliter    Vital Signs  T(C): 37.2 (03-19-24 @ 05:13), Max: 37.2 (03-19-24 @ 05:13)  T(F): 99 (03-19-24 @ 05:13), Max: 99 (03-19-24 @ 05:13)  HR: 116 (03-19-24 @ 05:13) (92 - 116)  BP: 127/83 (03-19-24 @ 05:13) (113/84 - 139/86)  RR: 18 (03-19-24 @ 05:13) (18 - 18)  SpO2: 95% (03-19-24 @ 05:13) (95% - 100%)    Physical Exam:  Gen: mild distress/upset due to rash   HEENT: EOMI, MMM, L eye is injected   Chest: Equal chest rise, speaking full sentences   Cardiac: RR  Abd: Nondistended   Ext: No edema   Neuro: AAOx3, normal/depressed mood and affect   Integument: diffuse maculopapular rash confluent over dital upper extremities with darkening of skin, edematous pink papules over proximal upper extremities extending to plaque like over top of back and nape of neck with dry skin     Labs:                        12.0   9.32  )-----------( 316      ( 18 Mar 2024 01:31 )             37.4     CBC Full  -  ( 18 Mar 2024 01:31 )  WBC Count : 9.32 K/uL  RBC Count : 4.67 M/uL  Hemoglobin : 12.0 g/dL  Hematocrit : 37.4 %  Platelet Count - Automated : 316 K/uL  Mean Cell Volume : 80.1 fl  Mean Cell Hemoglobin : 25.7 pg  Mean Cell Hemoglobin Concentration : 32.1 gm/dL  Auto Neutrophil # : 6.29 K/uL  Auto Lymphocyte # : 1.91 K/uL  Auto Monocyte # : 0.79 K/uL  Auto Eosinophil # : 0.24 K/uL  Auto Basophil # : 0.04 K/uL  Auto Neutrophil % : 67.5 %  Auto Lymphocyte % : 20.5 %  Auto Monocyte % : 8.5 %  Auto Eosinophil % : 2.6 %  Auto Basophil % : 0.4 %    03-18    137  |  101  |  15  ----------------------------<  162<H>  4.1   |  20<L>  |  0.63    Ca    9.8      18 Mar 2024 01:31    TPro  8.2  /  Alb  3.8  /  TBili  0.2  /  DBili  x   /  AST  26  /  ALT  44  /  AlkPhos  180<H>  03-18    PT/INR - ( 18 Mar 2024 01:31 )   PT: 10.8 sec;   INR: 0.98 ratio         PTT - ( 18 Mar 2024 01:31 )  PTT:31.6 sec

## 2024-03-19 NOTE — CONSULT NOTE ADULT - ATTENDING COMMENTS
Pt with recurrent rash and elevated ESR (120). Prev seen by our service with somewhat different rash- bx at that time showed dermal hypersensitivity and rash was accompanied by systemic sxs. Thought to be infectious although source never identified. 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 Today's rash may represent an id reaction to contact derm. Would also consider other etiologies such as PMLE (although denies sunlight exposure). We have biopsied again to rule out other etiologies (i.e. AI CTD, atypical BP) given ESR, but this may be unrelated. Agree with abdominal ultrasound given elevated ALP. Would check stool O&P and BP abs as well.
I personally interviewed and examined the patient. Agree with rheumatology resident's note with my edits as above.

## 2024-03-19 NOTE — PATIENT PROFILE ADULT - FUNCTIONAL ASSESSMENT - DAILY ACTIVITY 3.
Detail Level: Simple Additional Notes: Recommended retin A OTC or prescription drug. 4 = No assist / stand by assistance

## 2024-03-19 NOTE — CONSULT NOTE ADULT - SUBJECTIVE AND OBJECTIVE BOX
Chief Complaint:  Patient is a 63y old  Female who presents with a chief complaint of Painful Rash (19 Mar 2024 11:35)      Date of service: 03-19-24 @ 12:47    HPI:    The patient is a  63F with a PMH of DM, presenting for a rash. In 01/2024 she noted a mild rash on her left arm. Biopsy w/ derm showed contact dermatitis and she was prescribed topical agents. The rash has since progressed to her b/l arm, chest, back and face. Labs by PCP notable for abnormal ALP so she was referred to outpatient hepatology Dr. Knapp. She was planned for additional hepatology workup but was admitted before it could take place. No self or FH of autoimmune or liver disease.     The patient denies dysphagia, nausea and vomiting, abdominal pain, diarrhea, unintentional weight loss, change in bowel habits or NSAID use.      Allergies:  penicillins (Swelling)      Home Medications:    Hospital Medications:  atorvastatin 40 milliGRAM(s) Oral at bedtime  clobetasol 0.05% Ointment 1 Application(s) Topical two times a day  dextrose 5%. 1000 milliLiter(s) IV Continuous <Continuous>  dextrose 5%. 1000 milliLiter(s) IV Continuous <Continuous>  dextrose 50% Injectable 25 Gram(s) IV Push once  dextrose 50% Injectable 12.5 Gram(s) IV Push once  dextrose 50% Injectable 25 Gram(s) IV Push once  dextrose Oral Gel 15 Gram(s) Oral once PRN  gabapentin 300 milliGRAM(s) Oral three times a day  glucagon  Injectable 1 milliGRAM(s) IntraMuscular once  insulin lispro (ADMELOG) corrective regimen sliding scale   SubCutaneous three times a day before meals  lisinopril 20 milliGRAM(s) Oral daily      PMHX/PSHX:  HLD (hyperlipidemia)    DM (diabetes mellitus)    H/O sinus tachycardia    No significant past surgical history        Family history:  FH: type 2 diabetes (Father, Mother, Sibling)        Social History:   Denies ethanol use.  Denies illicit drug use.    ROS:     General:  No wt loss, fevers, chills, night sweats, fatigue,   Eyes:  Good vision, no reported pain  ENT:  No sore throat, pain, runny nose, dysphagia  CV:  No pain, palpitations, hypo/hypertension  Resp:  No dyspnea, cough, tachypnea, wheezing  GI:  See HPI  :  No pain, bleeding, incontinence, nocturia  Muscle:  No pain, weakness  Neuro:  No weakness, tingling, memory problems  Psych:  No fatigue, insomnia, mood problems, depression  Endocrine:  No polyuria, polydipsia, cold/heat intolerance  Heme:  No petechiae, ecchymosis, easy bruisability  Integumentary:  No rash, edema      PHYSICAL EXAM:     GENERAL:  Appears stated age, well-groomed, well-nourished, no distress  HEENT:  NC/AT,  conjunctivae anicteric, clear and pink,   NECK: supple, trachea midline  CHEST:  Full & symmetric excursion, no increased effort, breath sounds clear  HEART:  Regular rhythm, no JVD  ABDOMEN:  Soft, non-tender, non-distended, normoactive bowel sounds,  no masses , no hepatosplenomegaly  EXTREMITIES:  no cyanosis,clubbing or edema  SKIN:  No rash, erythema, or, ecchymoses, no jaundice  NEURO:  Alert, non-focal, no asterixis  PSYCH: Appropriate affect, oriented to place and time  RECTAL: Deferred      Vital Signs:  Vital Signs Last 24 Hrs  T(C): 36.5 (19 Mar 2024 10:41), Max: 37.2 (19 Mar 2024 05:13)  T(F): 97.7 (19 Mar 2024 10:41), Max: 99 (19 Mar 2024 05:13)  HR: 110 (19 Mar 2024 10:41) (92 - 116)  BP: 111/71 (19 Mar 2024 10:41) (111/71 - 139/86)  BP(mean): 104 (18 Mar 2024 17:20) (96 - 104)  RR: 18 (19 Mar 2024 10:41) (18 - 18)  SpO2: 97% (19 Mar 2024 10:41) (95% - 100%)    Parameters below as of 19 Mar 2024 10:41  Patient On (Oxygen Delivery Method): room air      Daily     Daily     LABS: Labs personally reviewed by me:                        12.0   9.32  )-----------( 316      ( 18 Mar 2024 01:31 )             37.4     03-18    137  |  101  |  15  ----------------------------<  162<H>  4.1   |  20<L>  |  0.63    Ca    9.8      18 Mar 2024 01:31    TPro  8.2  /  Alb  3.8  /  TBili  0.2  /  DBili  x   /  AST  26  /  ALT  44  /  AlkPhos  180<H>  03-18    LIVER FUNCTIONS - ( 18 Mar 2024 01:31 )  Alb: 3.8 g/dL / Pro: 8.2 g/dL / ALK PHOS: 180 U/L / ALT: 44 U/L / AST: 26 U/L / GGT: x           PT/INR - ( 18 Mar 2024 01:31 )   PT: 10.8 sec;   INR: 0.98 ratio         PTT - ( 18 Mar 2024 01:31 )  PTT:31.6 sec  Urinalysis Basic - ( 18 Mar 2024 01:31 )    Color: x / Appearance: x / SG: x / pH: x  Gluc: 162 mg/dL / Ketone: x  / Bili: x / Urobili: x   Blood: x / Protein: x / Nitrite: x   Leuk Esterase: x / RBC: x / WBC x   Sq Epi: x / Non Sq Epi: x / Bacteria: x          Imaging personally reviewed by me:

## 2024-03-19 NOTE — PROGRESS NOTE ADULT - SUBJECTIVE AND OBJECTIVE BOX
INTERVAL HPI/OVERNIGHT EVENTS:    Pt feels rash is stable, denies new areas of involvement. Felt that clobetasol ointment helped to relieve burning/itching sensation on arms but due to small size of tube was unable to apply to all affected areas on body. Anxious about additional workup (abdominal imaging, etc.). Denies fever/chills, n/v/d, chest pain, shortness of breath. Notes she has a fast heart rate but denies feeling palpitations. No hematuria, dysuria, change in urinary frequency.       MEDICATIONS  (STANDING):  atorvastatin 40 milliGRAM(s) Oral at bedtime  clobetasol 0.05% Ointment 1 Application(s) Topical two times a day  dextrose 5%. 1000 milliLiter(s) (50 mL/Hr) IV Continuous <Continuous>  dextrose 5%. 1000 milliLiter(s) (100 mL/Hr) IV Continuous <Continuous>  dextrose 50% Injectable 25 Gram(s) IV Push once  dextrose 50% Injectable 12.5 Gram(s) IV Push once  dextrose 50% Injectable 25 Gram(s) IV Push once  diphenhydrAMINE Injectable 25 milliGRAM(s) IV Push once  gabapentin 300 milliGRAM(s) Oral three times a day  glucagon  Injectable 1 milliGRAM(s) IntraMuscular once  influenza   Vaccine 0.5 milliLiter(s) IntraMuscular once  insulin lispro (ADMELOG) corrective regimen sliding scale   SubCutaneous three times a day before meals  lisinopril 20 milliGRAM(s) Oral daily    MEDICATIONS  (PRN):  dextrose Oral Gel 15 Gram(s) Oral once PRN Blood Glucose LESS THAN 70 milliGRAM(s)/deciliter      Allergies    penicillins (Swelling)    Intolerances        REVIEW OF SYSTEMS    General: no fevers/chills, no lethargy  	  Skin/Breast: see HPI	    Ophthalmologic: no eye pain or change in vision	    ENMT: no dysphagia or change in hearing    Respiratory and Thorax: no SOB or cough    Cardiovascular: no palpitations or chest pain    Gastrointestinal: no abdominal pain or blood in stool     Genitourinary: no dysuria or frequency    Musculoskeletal: no joint pains or weakness	    Neurological: no weakness, numbness, or tingling      Vital Signs Last 24 Hrs  T(C): 36.7 (19 Mar 2024 18:27), Max: 37.2 (19 Mar 2024 05:13)  T(F): 98 (19 Mar 2024 18:27), Max: 99 (19 Mar 2024 05:13)  HR: 106 (19 Mar 2024 18:27) (103 - 116)  BP: 117/80 (19 Mar 2024 18:27) (111/71 - 139/86)  BP(mean): --  RR: 18 (19 Mar 2024 18:27) (18 - 18)  SpO2: 99% (19 Mar 2024 18:27) (95% - 99%)    Parameters below as of 19 Mar 2024 18:27  Patient On (Oxygen Delivery Method): room air          PHYSICAL EXAM:    General: Well appearing, well nourished, in no apparent distress  Lymph: No visible lymphadenopathy  Skin: The scalp/hair, head/face, conjunctivae/lids, lips/teeth/gums, neck, digits/nails, right and left axilla, right and left upper and lower extremities, chest, abdomen, back, buttocks and groin area. No bromhidrosis or hyperhidrosis.    Of note on skin exam:  slightly improved edematous pink papules and papules coalescing into lichenified plaques on bilateral upper extremities, scattered papules on dorsal hands, central chest and neck, abdomen, upper cutaneous lip and cheeks  Occipital scalp and posterior neck with circumscribed pink scaly plaque      LABS:                        12.0   8.21  )-----------( 336      ( 19 Mar 2024 15:55 )             38.1     CBC Full  -  ( 19 Mar 2024 15:55 )  WBC Count : 8.21 K/uL  RBC Count : 4.66 M/uL  Hemoglobin : 12.0 g/dL  Hematocrit : 38.1 %  Platelet Count - Automated : 336 K/uL  Mean Cell Volume : 81.8 fl  Mean Cell Hemoglobin : 25.8 pg  Mean Cell Hemoglobin Concentration : 31.5 gm/dL  Auto Neutrophil # : 5.30 K/uL  Auto Lymphocyte # : 1.87 K/uL  Auto Monocyte # : 0.80 K/uL  Auto Eosinophil # : 0.17 K/uL  Auto Basophil # : 0.03 K/uL  Auto Neutrophil % : 64.5 %  Auto Lymphocyte % : 22.8 %  Auto Monocyte % : 9.7 %  Auto Eosinophil % : 2.1 %  Auto Basophil % : 0.4 %    03-19    139  |  101  |  19  ----------------------------<  194<H>  4.8   |  23  |  1.17    Ca    10.0      19 Mar 2024 15:55    TPro  8.7<H>  /  Alb  4.2  /  TBili  0.2  /  DBili  x   /  AST  18  /  ALT  37  /  AlkPhos  181<H>  03-19    PT/INR - ( 18 Mar 2024 01:31 )   PT: 10.8 sec;   INR: 0.98 ratio     PTT - ( 18 Mar 2024 01:31 )  PTT:31.6 sec

## 2024-03-19 NOTE — CONSULT NOTE ADULT - SUBJECTIVE AND OBJECTIVE BOX
***Consult has been received. Note is in progress and incomplete without attending attestation.***    Iris Marino MD, PGY-1  Rheumatology Consult Service  Reachable on TEAMS    MAGDAMAGGIE RODRIGUEZ  [id number]    HISTORY OF PRESENT ILLNESS:  63 F with PMH of T2DM, HLD, thyroid nodule, palpitations, ZIYAD/BSO, and SBO 2/2 adhesions presenting with acute worsening of chronic rash that first appeared on the forearms and has since spread up the arms to her torso, face, and posterior neck. The rash is itchy and occasionally painful and she notes sensation of swelling associated with the larger plaques on her forearms. She was previously seen for the rash by an outpatient dermatologist in January with biopsy showing spongiotic dermatitis; she was given topical steroids and was recommended to switch to non-scented non-irritating soaps and detergents for suspected contact dermatitis, but did not see any improvement at that time. She was also seen by allergy for patch testing which was positive for cobalt and two others that she cannot remember. She was also seen in the past by dermatology for a facial rash and was given PO doxycycline at that time which she feels like improved her facial rash after several weeks. In terms of associated symptoms she does endorse one month of dry mouth but also notes that her diabetes has been worse in the past few months.     She denies any previous personal or family history of autoimmune or connective tissue diseases, malignancy, or skin conditions. No fevers, chills, weight loss, night sweats, mouth sores, dry or painful eyes, joint pain or stiffness, muscle pain or weakness, abdominal pain, diarrhea or constipation. No recent travel, sick contacts, or outdoor activities. She lives with her sister who does not have any pruritis or rashes. She was recently started on ozempic for an increase in her A1c to 8 but no other changes to medications or medical history.       RHEUM ROS:  Denies fevers/chills/weight loss/night sweats/alopecia/sinus disease/asthma history/oral ulcers/dysphagia/vision changes/Raynauds/VTE/miscarriages/urinary changes/edema/SOB/joint pain/swelling/jaw claudication/rash/photosensitivity/morning stiffness    Endorses dry mouth      MEDICATIONS  (STANDING):  atorvastatin 40 milliGRAM(s) Oral at bedtime  clobetasol 0.05% Ointment 1 Application(s) Topical two times a day  dextrose 5%. 1000 milliLiter(s) (50 mL/Hr) IV Continuous <Continuous>  dextrose 5%. 1000 milliLiter(s) (100 mL/Hr) IV Continuous <Continuous>  dextrose 50% Injectable 25 Gram(s) IV Push once  dextrose 50% Injectable 12.5 Gram(s) IV Push once  dextrose 50% Injectable 25 Gram(s) IV Push once  gabapentin 300 milliGRAM(s) Oral three times a day  glucagon  Injectable 1 milliGRAM(s) IntraMuscular once  insulin lispro (ADMELOG) corrective regimen sliding scale   SubCutaneous three times a day before meals  lisinopril 20 milliGRAM(s) Oral daily    MEDICATIONS  (PRN):  dextrose Oral Gel 15 Gram(s) Oral once PRN Blood Glucose LESS THAN 70 milliGRAM(s)/deciliter    Allergies:  penicillins (Swelling)      PERTINENT MEDICATION HISTORY:  Recently started on ozempic for increase in A1c  No other recent medication changes  Rash previously treated with topical steroids, PO doxycycline for facial rash      SOCIAL HISTORY: No smoking or drug use; occasional alcohol use; lives with sister; , not sexually active  OCCUPATION: Works in office/administration  TRAVEL HISTORY: Born in [facility]; no recent travel out of the     FAMILY HISTORY:  FH: type 2 diabetes (Father, Mother, Sibling)      Vital Signs Last 24 Hrs  T(C): 36.5 (19 Mar 2024 10:41), Max: 37.2 (19 Mar 2024 05:13)  T(F): 97.7 (19 Mar 2024 10:41), Max: 99 (19 Mar 2024 05:13)  HR: 110 (19 Mar 2024 10:41) (92 - 116)  BP: 111/71 (19 Mar 2024 10:41) (111/71 - 139/86)  BP(mean): 104 (18 Mar 2024 17:20) (96 - 104)  RR: 18 (19 Mar 2024 10:41) (18 - 18)  SpO2: 97% (19 Mar 2024 10:41) (95% - 100%)    Parameters below as of 19 Mar 2024 10:41  Patient On (Oxygen Delivery Method): room air    Physical Exam:  General: No apparent distress  HEENT: EOMI, MMM  CVS: +S1/S2, RRR, no murmurs/rubs/gallops  Resp: CTA b/l. No crackles/wheezing  GI: Soft, NT/ND +BS  MSK: no swelling/warmth/erythema of the joints of the UE/LE  Neuro: AAOx3  Skin: pink edematous coalescing papules and plaques on the forearms, back, posterior neck; some lichenified areas    LABS:                        12.0   9.32  )-----------( 316      ( 18 Mar 2024 01:31 )             37.4     03-18    137  |  101  |  15  ----------------------------<  162<H>  4.1   |  20<L>  |  0.63    Ca    9.8      18 Mar 2024 01:31    TPro  8.2  /  Alb  3.8  /  TBili  0.2  /  DBili  x   /  AST  26  /  ALT  44  /  AlkPhos  180<H>  03-18    PT/INR - ( 18 Mar 2024 01:31 )   PT: 10.8 sec;   INR: 0.98 ratio    PTT - ( 18 Mar 2024 01:31 )  PTT:31.6 sec    Urinalysis Basic - ( 18 Mar 2024 01:31 )  Color: x / Appearance: x / SG: x / pH: x  Gluc: 162 mg/dL / Ketone: x  / Bili: x / Urobili: x   Blood: x / Protein: x / Nitrite: x   Leuk Esterase: x / RBC: x / WBC x   Sq Epi: x / Non Sq Epi: x / Bacteria: x    Rheumatology Work Up  Sedimentation Rate, Erythrocyte: 120 mm/hr *H* [0 - 20] (03-18-24 @ 01:31)  C-Reactive Protein, Serum: 17 mg/L *H* [0 - 4] (03-18-24 @ 01:31)  Sedimentation Rate, Erythrocyte: 85 mm/hr *H* [4 - 25] (10-29-23 @ 15:50)  C-Reactive Protein, Serum: 139.8 mg/L *H* (10-29-23 @ 15:50) ***Consult has been received. Note is in progress and incomplete without attending attestation.***    Iris Marino MD, PGY-1  Rheumatology Consult Service  Reachable on TEAMS    MAGDAMAGGIE RODRIGUEZ  [id number]    HISTORY OF PRESENT ILLNESS:  63 F with PMH of T2DM, HLD, thyroid nodule, palpitations, ZIYAD/ BSO, and SBO 2/2 adhesions presenting with acute worsening of chronic rash that first appeared on the forearms and has since spread up the arms to her torso, face, and posterior neck. The rash is itchy and occasionally painful and she notes sensation of swelling associated with the larger plaques on her forearms. She was previously seen for the rash by an outpatient dermatologist in January with biopsy showing spongiotic dermatitis; she was given topical steroids and was recommended to switch to non-scented non-irritating soaps and detergents for suspected contact dermatitis, but did not see any improvement at that time. She was also seen by allergy for patch testing which was positive for cobalt and two others that she cannot remember. She was also seen in the past by dermatology for a facial rash and was given PO doxycycline at that time which she feels like improved her facial rash after several weeks. In terms of associated symptoms she does endorse one month of dry mouth but also notes that her diabetes has been worse in the past few months.     She denies any previous personal or family history of autoimmune or connective tissue diseases, malignancy, or skin conditions. No fevers, chills, weight loss, night sweats, mouth sores, dry or painful eyes, joint pain or stiffness, muscle pain or weakness, abdominal pain, diarrhea or constipation. No recent travel, sick contacts, or outdoor activities. She lives with her sister who does not have any pruritis or rashes. She was recently started on ozempic for an increase in her A1c to 8 but no other changes to medications or medical history.       RHEUM ROS:  Denies fevers/chills/weight loss/night sweats/alopecia/sinus disease/asthma history/oral ulcers/dysphagia/vision changes/ Raynaud's / VTE / miscarriages /urinary changes/edema/SOB/joint pain/swelling/jaw claudication/rash/photosensitivity/morning stiffness    Endorses dry mouth      MEDICATIONS  (STANDING):  atorvastatin 40 milliGRAM(s) Oral at bedtime  clobetasol 0.05% Ointment 1 Application(s) Topical two times a day  dextrose 5%. 1000 milliLiter(s) (50 mL/Hr) IV Continuous <Continuous>  dextrose 5%. 1000 milliLiter(s) (100 mL/Hr) IV Continuous <Continuous>  dextrose 50% Injectable 25 Gram(s) IV Push once  dextrose 50% Injectable 12.5 Gram(s) IV Push once  dextrose 50% Injectable 25 Gram(s) IV Push once  gabapentin 300 milliGRAM(s) Oral three times a day  glucagon  Injectable 1 milliGRAM(s) IntraMuscular once  insulin lispro (ADMELOG) corrective regimen sliding scale   SubCutaneous three times a day before meals  lisinopril 20 milliGRAM(s) Oral daily    MEDICATIONS  (PRN):  dextrose Oral Gel 15 Gram(s) Oral once PRN Blood Glucose LESS THAN 70 milliGRAM(s)/deciliter    Allergies:  penicillins (Swelling)      PERTINENT MEDICATION HISTORY:  Recently started on ozempic for increase in A1c  No other recent medication changes  Rash previously treated with topical steroids, PO doxycycline for facial rash      SOCIAL HISTORY: No smoking or drug use; occasional alcohol use; lives with sister; , not sexually active  OCCUPATION: Works in office/administration  TRAVEL HISTORY: Born in [facility]; no recent travel out of the     FAMILY HISTORY:  FH: type 2 diabetes (Father, Mother, Sibling)      Vital Signs Last 24 Hrs  T(C): 36.5 (19 Mar 2024 10:41), Max: 37.2 (19 Mar 2024 05:13)  T(F): 97.7 (19 Mar 2024 10:41), Max: 99 (19 Mar 2024 05:13)  HR: 110 (19 Mar 2024 10:41) (92 - 116)  BP: 111/71 (19 Mar 2024 10:41) (111/71 - 139/86)  BP(mean): 104 (18 Mar 2024 17:20) (96 - 104)  RR: 18 (19 Mar 2024 10:41) (18 - 18)  SpO2: 97% (19 Mar 2024 10:41) (95% - 100%)    Parameters below as of 19 Mar 2024 10:41  Patient On (Oxygen Delivery Method): room air    Physical Exam:  General: No apparent distress  HEENT: EOMI, MMM  CVS: +S1/S2, RRR, no murmurs/rubs/gallops  Resp: CTA b/l. No crackles/wheezing  GI: Soft, NT/ND +BS  MSK: no swelling/warmth/erythema of the joints of the UE/LE  Neuro: AAOx3  Skin: pink edematous coalescing papules and plaques on the forearms, back, posterior neck; some lichenified areas    LABS:                        12.0   9.32  )-----------( 316      ( 18 Mar 2024 01:31 )             37.4     03-18    137  |  101  |  15  ----------------------------<  162<H>  4.1   |  20<L>  |  0.63    Ca    9.8      18 Mar 2024 01:31    TPro  8.2  /  Alb  3.8  /  TBili  0.2  /  DBili  x   /  AST  26  /  ALT  44  /  AlkPhos  180<H>  03-18    PT/INR - ( 18 Mar 2024 01:31 )   PT: 10.8 sec;   INR: 0.98 ratio    PTT - ( 18 Mar 2024 01:31 )  PTT:31.6 sec    Urinalysis Basic - ( 18 Mar 2024 01:31 )  Color: x / Appearance: x / SG: x / pH: x  Gluc: 162 mg/dL / Ketone: x  / Bili: x / Urobili: x   Blood: x / Protein: x / Nitrite: x   Leuk Esterase: x / RBC: x / WBC x   Sq Epi: x / Non Sq Epi: x / Bacteria: x    Rheumatology Work Up  Sedimentation Rate, Erythrocyte: 120 mm/hr *H* [0 - 20] (03-18-24 @ 01:31)  C-Reactive Protein, Serum: 17 mg/L *H* [0 - 4] (03-18-24 @ 01:31)  Sedimentation Rate, Erythrocyte: 85 mm/hr *H* [4 - 25] (10-29-23 @ 15:50)  C-Reactive Protein, Serum: 139.8 mg/L *H* (10-29-23 @ 15:50)

## 2024-03-19 NOTE — PROGRESS NOTE ADULT - ASSESSMENT
This is a 63 year old female PMH: HLD and DM2. presents to [facility] for a progressing painful rash x 2 months.    Plan:    # Rash:  - Sed rate 120, CRP 17  - CXR negative  - Per Derm-> - Start clobetasol 0.05% ointment BID to affected areas on BODY (Avoid face, groin, skin folds)  - Follow-up punch biopsy from L upper arm performed 3/18. Wound care with daily vaseline and band-aid placement  - F/u CARTER, rheumatoid factor, HIV, acute hepatitis panel, SPEP, UPEP, serum light chains, urine light chains  - Derm following  - Rheum eval pending (called)    # Sinus Tachy:  - Monitor on tele    # Elevated LFTs/ Hep B/A  - GI eval pending (called)    # HLD:  - C/w current meds    # DM2:  - HgbA1c  - Continue to monitor blood glucose levels  - Sliding Scale    # GI ppx:  - Bowel regimen prn    # DVT ppx:  - IPC's    [facility]

## 2024-03-19 NOTE — CONSULT NOTE ADULT - ASSESSMENT
Ms. Reynolds is a 63y Female with PMHx of lower back pain, Type II DM, HLD, GERD, hx of SBO due to adhesions, thyroid nodule, palpitations, IZYAD/BSO who presented with acute worsening of diffuse upper body skin rash. She stated she first noted this rash 01/2024 begining on on her hands and eventually progressing up her arms, torso, face and posterior nape. She has seen dermatology in the past and biopsy showed signs of contact dermatitis. She stated she had improvement in the rash, particularly the facial component while she was on a 15 day course of Doxycycline BID in Feb 2024, and upon stopping, the rash has returned and is progressively worsening associated with pain, prutitis.     Per chart and dermatology pt was seen by Dermatology during [facility] admission in late October 2023, noted to have diffuse rash in the setting of fevers, hypotension, elevated inflammatory markers. Biopsy on 10/30 was consistent with dermal hypersensitivity reaction, possibly 2/2 viral process. Extensive infectious workup at that time was negative.    She was being evaluated by GI recently who found elevated ALP to 217 with isoenzymes showing 17% for macrohepatic isoenzymes and therefore was concerning for cholestatic liver disease associated rash. She was recommended for US Abd but was not able to complete it and is now admitted here to [facility]. GI requesting continuation of workup with possible MRI/MCRP. Dermatology consulted and repeated biopsy inpatient.     # Diffuse Upper body rash  -    - IgG outpatient elevated to 1662   - Dermatology repeated biopsy  - Recommend rheumatology evaluation   - Recommend GI evaluation per outpatient evaltion   - Recommend symptomatic treatment for now given pruritis   - Obtain SPEP, JARRETT, Serum free light chains, serum immunoglobin levels, LDH, Beta-microglobulin   - f/u biopsy results, ask to perform flow cytometry on sample if possible     # Elevated LFTs  - , progressively worsening compared to outpatient AST approx 60s last week   - Outpatient Alk phos was 191  - Coags wnl, Bilrubin normal, Hep C neg, antimitochrondrial ab outpatient negative   -- Per GI outpatient Macrohepatic isoenzyme was 17%  - GI evaluation as above  - Outpt GI evaluated and recommending US Abd and MRI/MRCP   - Continue to trend     # Elevated ESR  # Hx of iron deficiency   - Outpatient Ferritin 32,  sat 15%  - CARTER neg outpatient   - Rheumatology consult       Thank you for allowing me to participate in the care of Ms. Reynolds please do not hesitate to call or text me if you have further questions or concerns.     Jessee Vela MD  [facility]   Division of Hematology/Oncology  [address] [place] [address], [address]  [place] [address]  P: [phone]  F: [phone]    Attestation:   Total time spent on the encounter: >60 minutes   ----Including >30 min face-to-face interaction in addition to chart review, reviewing treatment plan, and managing the patient’s chronic diagnoses as listed in the assessment----     1.	I have reviewed, analyzed and interpreted the following labs: CBC, CMP, imaging:  CXR (clear) impressions as above.   2.	I have reviewed notes stating pts current admission, consultants and follow ups.   3.	I have reviewed the past medical, family, and surgical history and confirmed with outpatient records

## 2024-03-19 NOTE — PROGRESS NOTE ADULT - ASSESSMENT
# Pruritic rash  - Ddx is broad, including contact dermatitis with id reaction vs. polymorphic light eruption vs. drug exanthem vs. connective tissue disease  - Continue clobetasol 0.05% ointment BID to affected areas on BODY (Avoid face, groin, skin folds) PRN itch for up to 2 weeks at a time, with at least 1 week break before resuming as needed. Please order 5 tubes as patient has large body surface area involved.  - Follow-up skin biopsy performed 3/18. Wound care with daily vaseline and band-aid placement  - Please send the following labs for work up of other possible underlying etiologies of systemic inflammation with cutaneous eruption: CARTER, rheumatoid factor, HIV, acute hepatitis panel, SPEP, UPEP, serum light chains, urine light chains; please add stool ova and parasites  - Additional workup per hematology/oncology and rheumatology      Thank you for this consult. Patient was seen and reviewed with attending dermatologist Dr. Hickman    Dermatology will continue to follow. Please page [phone] with a 10-digit call-back number for further related questions.    Deanne Rivas MD  Resident Physician, PGY-2   [facility]   Pager: [phone]

## 2024-03-20 LAB
A1C WITH ESTIMATED AVERAGE GLUCOSE RESULT: 8.1 % — HIGH (ref 4–5.6)
ALBUMIN SERPL ELPH-MCNC: 4.2 G/DL — SIGNIFICANT CHANGE UP (ref 3.3–5)
ALP SERPL-CCNC: 182 U/L — HIGH (ref 40–120)
ALT FLD-CCNC: 34 U/L — SIGNIFICANT CHANGE UP (ref 10–45)
ANION GAP SERPL CALC-SCNC: 14 MMOL/L — SIGNIFICANT CHANGE UP (ref 5–17)
AST SERPL-CCNC: 15 U/L — SIGNIFICANT CHANGE UP (ref 10–40)
B2 MICROGLOB SERPL-MCNC: 2.6 MG/L — HIGH (ref 0.8–2.2)
BILIRUB DIRECT SERPL-MCNC: <0.1 MG/DL — SIGNIFICANT CHANGE UP (ref 0–0.3)
BILIRUB INDIRECT FLD-MCNC: >0.2 MG/DL — SIGNIFICANT CHANGE UP (ref 0.2–1)
BILIRUB SERPL-MCNC: 0.3 MG/DL — SIGNIFICANT CHANGE UP (ref 0.2–1.2)
BUN SERPL-MCNC: 15 MG/DL — SIGNIFICANT CHANGE UP (ref 7–23)
CALCIUM SERPL-MCNC: 10.1 MG/DL — SIGNIFICANT CHANGE UP (ref 8.4–10.5)
CHLORIDE SERPL-SCNC: 101 MMOL/L — SIGNIFICANT CHANGE UP (ref 96–108)
CK SERPL-CCNC: 45 U/L — SIGNIFICANT CHANGE UP (ref 25–170)
CO2 SERPL-SCNC: 22 MMOL/L — SIGNIFICANT CHANGE UP (ref 22–31)
CREAT SERPL-MCNC: 0.74 MG/DL — SIGNIFICANT CHANGE UP (ref 0.5–1.3)
DEPRECATED KAPPA LC FREE/LAMBDA SER: 9.25 RATIO — HIGH (ref 0.7–6.02)
EGFR: 91 ML/MIN/1.73M2 — SIGNIFICANT CHANGE UP
ESTIMATED AVERAGE GLUCOSE: 186 MG/DL — HIGH (ref 68–114)
GLUCOSE BLDC GLUCOMTR-MCNC: 158 MG/DL — HIGH (ref 70–99)
GLUCOSE BLDC GLUCOMTR-MCNC: 172 MG/DL — HIGH (ref 70–99)
GLUCOSE BLDC GLUCOMTR-MCNC: 252 MG/DL — HIGH (ref 70–99)
GLUCOSE BLDC GLUCOMTR-MCNC: 284 MG/DL — HIGH (ref 70–99)
GLUCOSE BLDC GLUCOMTR-MCNC: 354 MG/DL — HIGH (ref 70–99)
GLUCOSE SERPL-MCNC: 204 MG/DL — HIGH (ref 70–99)
HCT VFR BLD CALC: 39.2 % — SIGNIFICANT CHANGE UP (ref 34.5–45)
HGB BLD-MCNC: 12.3 G/DL — SIGNIFICANT CHANGE UP (ref 11.5–15.5)
HIV 1+2 AB+HIV1 P24 AG SERPL QL IA: SIGNIFICANT CHANGE UP
IGA FLD-MCNC: 252 MG/DL — SIGNIFICANT CHANGE UP (ref 84–499)
IGG FLD-MCNC: 1668 MG/DL — HIGH (ref 610–1660)
IGM SERPL-MCNC: 63 MG/DL — SIGNIFICANT CHANGE UP (ref 35–242)
KAPPA LC SER QL IFE: 5.05 MG/DL — HIGH (ref 0.33–1.94)
KAPPA LC UR-MCNC: 36.09 MG/L — HIGH
KAPPA/LAMBDA FREE LIGHT CHAIN RATIO, SERUM: 1.36 RATIO — SIGNIFICANT CHANGE UP (ref 0.26–1.65)
LAMBDA LC SER QL IFE: 3.71 MG/DL — HIGH (ref 0.57–2.63)
LAMBDA LC UR-MCNC: 3.9 MG/L — SIGNIFICANT CHANGE UP
MCHC RBC-ENTMCNC: 25.4 PG — LOW (ref 27–34)
MCHC RBC-ENTMCNC: 31.4 GM/DL — LOW (ref 32–36)
MCV RBC AUTO: 81 FL — SIGNIFICANT CHANGE UP (ref 80–100)
NRBC # BLD: 0 /100 WBCS — SIGNIFICANT CHANGE UP (ref 0–0)
PLATELET # BLD AUTO: 376 K/UL — SIGNIFICANT CHANGE UP (ref 150–400)
POTASSIUM SERPL-MCNC: 4.8 MMOL/L — SIGNIFICANT CHANGE UP (ref 3.5–5.3)
POTASSIUM SERPL-SCNC: 4.8 MMOL/L — SIGNIFICANT CHANGE UP (ref 3.5–5.3)
PROT SERPL-MCNC: 7.2 G/DL — SIGNIFICANT CHANGE UP (ref 6–8.3)
PROT SERPL-MCNC: 8.9 G/DL — HIGH (ref 6–8.3)
RBC # BLD: 4.84 M/UL — SIGNIFICANT CHANGE UP (ref 3.8–5.2)
RBC # FLD: 13.4 % — SIGNIFICANT CHANGE UP (ref 10.3–14.5)
SODIUM SERPL-SCNC: 137 MMOL/L — SIGNIFICANT CHANGE UP (ref 135–145)
WBC # BLD: 9.01 K/UL — SIGNIFICANT CHANGE UP (ref 3.8–10.5)
WBC # FLD AUTO: 9.01 K/UL — SIGNIFICANT CHANGE UP (ref 3.8–10.5)

## 2024-03-20 PROCEDURE — 93975 VASCULAR STUDY: CPT | Mod: 26

## 2024-03-20 PROCEDURE — 76700 US EXAM ABDOM COMPLETE: CPT | Mod: 26,59

## 2024-03-20 RX ORDER — CHLORHEXIDINE GLUCONATE 213 G/1000ML
1 SOLUTION TOPICAL DAILY
Refills: 0 | Status: DISCONTINUED | OUTPATIENT
Start: 2024-03-20 | End: 2024-03-22

## 2024-03-20 RX ADMIN — Medication 1 APPLICATION(S): at 05:45

## 2024-03-20 RX ADMIN — Medication 1 APPLICATION(S): at 17:07

## 2024-03-20 RX ADMIN — Medication 1: at 07:44

## 2024-03-20 RX ADMIN — Medication 1: at 12:03

## 2024-03-20 RX ADMIN — Medication 3: at 17:07

## 2024-03-20 RX ADMIN — GABAPENTIN 300 MILLIGRAM(S): 400 CAPSULE ORAL at 14:59

## 2024-03-20 RX ADMIN — ATORVASTATIN CALCIUM 40 MILLIGRAM(S): 80 TABLET, FILM COATED ORAL at 21:07

## 2024-03-20 RX ADMIN — LISINOPRIL 20 MILLIGRAM(S): 2.5 TABLET ORAL at 11:45

## 2024-03-20 RX ADMIN — GABAPENTIN 300 MILLIGRAM(S): 400 CAPSULE ORAL at 21:08

## 2024-03-20 NOTE — PROGRESS NOTE ADULT - SUBJECTIVE AND OBJECTIVE BOX
[facility] HEMATOLOGY/ONCOLOGY INPATIENT PROGRESS NOTE     Interval Hx:   03-20-24 @ 06:20: was seen at bedside today.    Meds:   MEDICATIONS  (STANDING):  atorvastatin 40 milliGRAM(s) Oral at bedtime  clobetasol 0.05% Ointment 1 Application(s) Topical two times a day  dextrose 5%. 1000 milliLiter(s) (100 mL/Hr) IV Continuous <Continuous>  dextrose 5%. 1000 milliLiter(s) (50 mL/Hr) IV Continuous <Continuous>  dextrose 50% Injectable 25 Gram(s) IV Push once  dextrose 50% Injectable 12.5 Gram(s) IV Push once  dextrose 50% Injectable 25 Gram(s) IV Push once  gabapentin 300 milliGRAM(s) Oral three times a day  glucagon  Injectable 1 milliGRAM(s) IntraMuscular once  influenza   Vaccine 0.5 milliLiter(s) IntraMuscular once  insulin lispro (ADMELOG) corrective regimen sliding scale   SubCutaneous three times a day before meals  lisinopril 20 milliGRAM(s) Oral daily    MEDICATIONS  (PRN):  dextrose Oral Gel 15 Gram(s) Oral once PRN Blood Glucose LESS THAN 70 milliGRAM(s)/deciliter    Vital Signs Last 24 Hrs  T(C): 36.4 (20 Mar 2024 04:22), Max: 36.7 (19 Mar 2024 18:27)  T(F): 97.6 (20 Mar 2024 04:22), Max: 98 (19 Mar 2024 18:27)  HR: 98 (20 Mar 2024 05:40) (94 - 110)  BP: 122/77 (20 Mar 2024 05:40) (94/58 - 122/77)  BP(mean): --  RR: 18 (20 Mar 2024 04:22) (18 - 18)  SpO2: 99% (20 Mar 2024 04:22) (97% - 99%)    Parameters below as of 20 Mar 2024 04:22  Patient On (Oxygen Delivery Method): room air    Physical Exam:  Gen: mild distress/upset due to rash   HEENT: EOMI, MMM, L eye is injected   Chest: Equal chest rise, speaking full sentences   Cardiac: RR  Abd: Nondistended   Ext: No edema   Neuro: AAOx3, normal/depressed mood and affect   Integument: diffuse maculopapular rash confluent over dital upper extremities with darkening of skin, edematous pink papules over proximal upper extremities extending to plaque like over top of back and nape of neck with dry skin     Labs:                        12.0   8.21  )-----------( 336      ( 19 Mar 2024 15:55 )             38.1     CBC Full  -  ( 19 Mar 2024 15:55 )  WBC Count : 8.21 K/uL  RBC Count : 4.66 M/uL  Hemoglobin : 12.0 g/dL  Hematocrit : 38.1 %  Platelet Count - Automated : 336 K/uL  Mean Cell Volume : 81.8 fl  Mean Cell Hemoglobin : 25.8 pg  Mean Cell Hemoglobin Concentration : 31.5 gm/dL  Auto Neutrophil # : 5.30 K/uL  Auto Lymphocyte # : 1.87 K/uL  Auto Monocyte # : 0.80 K/uL  Auto Eosinophil # : 0.17 K/uL  Auto Basophil # : 0.03 K/uL  Auto Neutrophil % : 64.5 %  Auto Lymphocyte % : 22.8 %  Auto Monocyte % : 9.7 %  Auto Eosinophil % : 2.1 %  Auto Basophil % : 0.4 %    03-19    139  |  101  |  19  ----------------------------<  194<H>  4.8   |  23  |  1.17    Ca    10.0      19 Mar 2024 15:55    TPro  8.7<H>  /  Alb  4.2  /  TBili  0.2  /  DBili  x   /  AST  18  /  ALT  37  /  AlkPhos  181<H>  03-19      Bilirubin Total: 0.2 mg/dL (03-19-24 @ 15:55)   [facility] HEMAT[facility]Y/ON[facility]OGY INPATIENT PROGRESS NOTE     Interval Hx:   03-20-24 @ 06:20: was seen at bedside today, awake and alert, pending US abd, GI recs noted, Derm and Rheum recs noted, possible dermatomyositis pending biopsy diagnosis, no overnight events, no new acute complaints     Meds:   MEDICATIONS  (STANDING):  atorvastatin 40 milliGRAM(s) Oral at bedtime  clobetasol 0.05% Ointment 1 Application(s) Topical two times a day  dextrose 5%. 1000 milliLiter(s) (100 mL/Hr) IV Continuous <Continuous>  dextrose 5%. 1000 milliLiter(s) (50 mL/Hr) IV Continuous <Continuous>  dextrose 50% Injectable 25 Gram(s) IV Push once  dextrose 50% Injectable 12.5 Gram(s) IV Push once  dextrose 50% Injectable 25 Gram(s) IV Push once  gabapentin 300 milliGRAM(s) Oral three times a day  glucagon  Injectable 1 milliGRAM(s) IntraMuscular once  influenza   Vaccine 0.5 milliLiter(s) IntraMuscular once  insulin lispro (ADMELOG) corrective regimen sliding scale   SubCutaneous three times a day before meals  lisinopril 20 milliGRAM(s) Oral daily    MEDICATIONS  (PRN):  dextrose Oral Gel 15 Gram(s) Oral once PRN Blood Glucose LESS THAN 70 milliGRAM(s)/deciliter    Vital Signs Last 24 Hrs  T(C): 36.4 (20 Mar 2024 04:22), Max: 36.7 (19 Mar 2024 18:27)  T(F): 97.6 (20 Mar 2024 04:22), Max: 98 (19 Mar 2024 18:27)  HR: 98 (20 Mar 2024 05:40) (94 - 110)  BP: 122/77 (20 Mar 2024 05:40) (94/58 - 122/77)  BP(mean): --  RR: 18 (20 Mar 2024 04:22) (18 - 18)  SpO2: 99% (20 Mar 2024 04:22) (97% - 99%)    Parameters below as of 20 Mar 2024 04:22  Patient On (Oxygen Delivery Method): room air    Physical Exam:  Gen: mild distress/upset due to rash   HEENT: EOMI, MMM, L eye is injected   Chest: Equal chest rise, speaking full sentences   Cardiac: RR  Abd: Nondistended   Ext: No edema   Neuro: AAOx3, normal/depressed mood and affect   Integument: diffuse maculopapular rash confluent over dital upper extremities with darkening of skin, edematous pink papules over proximal upper extremities extending to plaque like over top of back and nape of neck with dry skin     Labs:                        12.0   8.21  )-----------( 336      ( 19 Mar 2024 15:55 )             38.1     CBC Full  -  ( 19 Mar 2024 15:55 )  WBC Count : 8.21 K/uL  RBC Count : 4.66 M/uL  Hemoglobin : 12.0 g/dL  Hematocrit : 38.1 %  Platelet Count - Automated : 336 K/uL  Mean Cell Volume : 81.8 fl  Mean Cell Hemoglobin : 25.8 pg  Mean Cell Hemoglobin Concentration : 31.5 gm/dL  Auto Neutrophil # : 5.30 K/uL  Auto Lymphocyte # : 1.87 K/uL  Auto Monocyte # : 0.80 K/uL  Auto Eosinophil # : 0.17 K/uL  Auto Basophil # : 0.03 K/uL  Auto Neutrophil % : 64.5 %  Auto Lymphocyte % : 22.8 %  Auto Monocyte % : 9.7 %  Auto Eosinophil % : 2.1 %  Auto Basophil % : 0.4 %    03-19    139  |  101  |  19  ----------------------------<  194<H>  4.8   |  23  |  1.17    Ca    10.0      19 Mar 2024 15:55    TPro  8.7<H>  /  Alb  4.2  /  TBili  0.2  /  DBili  x   /  AST  18  /  ALT  37  /  AlkPhos  181<H>  03-19      Bilirubin Total: 0.2 mg/dL (03-19-24 @ 15:55)

## 2024-03-20 NOTE — PROGRESS NOTE ADULT - ASSESSMENT
This is a 63 year old female PMH: HLD and DM2. presents to [facility] for a progressing painful rash x 2 months.    Plan:    # Rash:  - Sed rate 120, CRP 17  - CXR negative  - Per Derm-> start clobetasol 0.05% ointment BID to affected areas on BODY (Avoid face, groin, skin folds)  - Follow-up punch biopsy from L upper arm performed 3/18. Wound care with daily vaseline and band-aid placement  - F/u CARTER, rheumatoid factor, HIV, acute hepatitis panel, SPEP, UPEP, serum light chains, urine light chains  - Derm/Rheum following    # Sinus Tachy:  - Monitor on tele    # Elevated LFTs/ Hep B/A  - Outpatient hepatology workup reviewed; ALP ~ 290, elevated . ALP is 180  - CARTER, SMA, AMA, A1AT, iron panel, TSH, Celiac panel, CK, hepatitis A/B/C serologies - all negative   - Abdominal US w/ duplex pending  - GI following    # HLD:  - C/w current meds    # DM2:  - HgbA1c  - Continue to monitor blood glucose levels  - Sliding Scale    # GI ppx:  - Bowel regimen prn    # DVT ppx:  - IPC's    [facility]  [phone]

## 2024-03-20 NOTE — PROGRESS NOTE ADULT - SUBJECTIVE AND OBJECTIVE BOX
SUBJECTIVE / OVERNIGHT EVENTS:    patient seen and examined  resting comfortably in bed  mild itching this AM  requesting more than 1 small tube of cream    --------------------------------------------------------------------------------------------  LABS:                        12.3   9.01  )-----------( 376      ( 20 Mar 2024 07:04 )             39.2     03-20    137  |  101  |  15  ----------------------------<  204<H>  4.8   |  22  |  0.74    Ca    10.1      20 Mar 2024 06:58    TPro  8.9<H>  /  Alb  4.2  /  TBili  0.3  /  DBili  <0.1  /  AST  15  /  ALT  34  /  AlkPhos  182<H>  03-20      CAPILLARY BLOOD GLUCOSE      POCT Blood Glucose.: 172 mg/dL (20 Mar 2024 07:28)  POCT Blood Glucose.: 218 mg/dL (19 Mar 2024 21:05)  POCT Blood Glucose.: 166 mg/dL (19 Mar 2024 17:55)  POCT Blood Glucose.: 206 mg/dL (19 Mar 2024 11:26)    CARDIAC MARKERS ( 20 Mar 2024 06:58 )  x     / x     / 45 U/L / x     / x          Urinalysis Basic - ( 20 Mar 2024 06:58 )    Color: x / Appearance: x / SG: x / pH: x  Gluc: 204 mg/dL / Ketone: x  / Bili: x / Urobili: x   Blood: x / Protein: x / Nitrite: x   Leuk Esterase: x / RBC: x / WBC x   Sq Epi: x / Non Sq Epi: x / Bacteria: x        RADIOLOGY & ADDITIONAL TESTS:    Imaging Personally Reviewed:  [x] YES  [ ] NO    Consultant(s) Notes Reviewed:  [x] YES  [ ] NO    MEDICATIONS  (STANDING):  atorvastatin 40 milliGRAM(s) Oral at bedtime  clobetasol 0.05% Ointment 1 Application(s) Topical two times a day  dextrose 5%. 1000 milliLiter(s) (50 mL/Hr) IV Continuous <Continuous>  dextrose 5%. 1000 milliLiter(s) (100 mL/Hr) IV Continuous <Continuous>  dextrose 50% Injectable 25 Gram(s) IV Push once  dextrose 50% Injectable 12.5 Gram(s) IV Push once  dextrose 50% Injectable 25 Gram(s) IV Push once  gabapentin 300 milliGRAM(s) Oral three times a day  glucagon  Injectable 1 milliGRAM(s) IntraMuscular once  influenza   Vaccine 0.5 milliLiter(s) IntraMuscular once  insulin lispro (ADMELOG) corrective regimen sliding scale   SubCutaneous three times a day before meals  lisinopril 20 milliGRAM(s) Oral daily    MEDICATIONS  (PRN):  dextrose Oral Gel 15 Gram(s) Oral once PRN Blood Glucose LESS THAN 70 milliGRAM(s)/deciliter      Care Discussed with Consultants/Other Providers [x] YES  [ ] NO    Vital Signs Last 24 Hrs  T(C): 36.4 (20 Mar 2024 04:22), Max: 36.7 (19 Mar 2024 18:27)  T(F): 97.6 (20 Mar 2024 04:22), Max: 98 (19 Mar 2024 18:27)  HR: 98 (20 Mar 2024 05:40) (94 - 106)  BP: 122/77 (20 Mar 2024 05:40) (94/58 - 122/77)  BP(mean): --  RR: 18 (20 Mar 2024 04:22) (18 - 18)  SpO2: 99% (20 Mar 2024 04:22) (98% - 99%)    Parameters below as of 20 Mar 2024 04:22  Patient On (Oxygen Delivery Method): room air      I&O's Summary    19 Mar 2024 07:01  -  20 Mar 2024 07:00  --------------------------------------------------------  IN: 480 mL / OUT: 0 mL / NET: 480 mL     PHYSICAL EXAM:  GENERAL: NAD, well-developed, comfortable  HEAD:  Atraumatic, Normocephalic  EYES: EOMI, PERRLA, conjunctiva and sclera clear  NECK: Supple, No JVD  CHEST/LUNG: Clear to auscultation bilaterally; No wheeze  HEART: Regular rate and rhythm; No murmurs, rubs, or gallops  ABDOMEN: Soft, Nontender, Nondistended; Bowel sounds present  NEURO: AAOx3, no focal weakness, 5/5 b/l extremity strength, b/l knee no arthritis, no effusion   EXTREMITIES:  2+ Peripheral Pulses, No clubbing, cyanosis, or edema  SKIN: + raised rash throughout entire upper body

## 2024-03-20 NOTE — PROGRESS NOTE ADULT - ASSESSMENT
Ms. Reynolds is a 63y Female with PMHx of lower back pain, Type II DM, HLD, GERD, hx of SBO due to adhesions, thyroid nodule, palpitations, ZIYAD/BSO who presented with acute worsening of diffuse upper body skin rash. She stated she first noted this rash 01/2024 begining on on her hands and eventually progressing up her arms, torso, face and posterior nape. She has seen dermatology in the past and biopsy showed signs of contact dermatitis. She stated she had improvement in the rash, particularly the facial component while she was on a 15 day course of Doxycycline BID in Feb 2024, and upon stopping, the rash has returned and is progressively worsening associated with pain, prutitis.     Per chart and dermatology pt was seen by Dermatology during [facility] admission in late October 2023, noted to have diffuse rash in the setting of fevers, hypotension, elevated inflammatory markers. Biopsy on 10/30 was consistent with dermal hypersensitivity reaction, possibly 2/2 viral process. Extensive infectious workup at that time was negative.    She was being evaluated by GI recently who found elevated ALP to 217 with isoenzymes showing 17% for macrohepatic isoenzymes and therefore was concerning for cholestatic liver disease associated rash. She was recommended for US Abd but was not able to complete it and is now admitted here to [facility]. GI requesting continuation of workup with possible MRI/MCRP. Dermatology consulted and repeated biopsy inpatient.     # Diffuse Upper body rash  -    - IgG outpatient elevated to 1662   - Dermatology repeated biopsy  - Recommend rheumatology evaluation   - Recommend GI evaluation per outpatient evaltion   - Recommend symptomatic treatment for now given pruritis   - Obtain SPEP, JARRETT, Serum free light chains, serum immunoglobin levels, LDH, Beta-microglobulin   - f/u biopsy results, ask to perform flow cytometry on sample if possible     # Elevated LFTs  - , progressively worsening compared to outpatient AST approx 60s last week   - Outpatient Alk phos was 191  - Coags wnl, Bilrubin normal, Hep C neg, antimitochrondrial ab outpatient negative   -- Per GI outpatient Macrohepatic isoenzyme was 17%  - GI evaluation as above  - Outpt GI evaluated and recommending US Abd and MRI/MRCP   - Continue to trend     # Elevated ESR  # Hx of iron deficiency   - Outpatient Ferritin 32,  sat 15%  - CARTER neg outpatient   - Rheumatology consult       Thank you for allowing me to participate in the care of Ms. Reynolds please do not hesitate to call or text me if you have further questions or concerns.     Jessee Vela MD  [facility]   Division of Hematology/Oncology  [address] [place] [address], [address]  [place] [address]  P: [phone]  F: [phone]    Attestation:   Total time spent on the encounter: >60 minutes   ----Including >30 min face-to-face interaction in addition to chart review, reviewing treatment plan, and managing the patient’s chronic diagnoses as listed in the assessment----     1.	I have reviewed, analyzed and interpreted the following labs: CBC, CMP, imaging:  CXR (clear) impressions as above.   2.	I have reviewed notes stating pts current admission, consultants and follow ups.   3.	I have reviewed the past medical, family, and surgical history and confirmed with outpatient records  Ms. Reynolds is a 63y Female with PMHx of lower back pain, Type II DM, HLD, GERD, hx of SBO due to adhesions, thyroid nodule, palpitations, ZIYAD/BSO who presented with acute worsening of diffuse upper body skin rash. She stated she first noted this rash 01/2024 begining on on her hands and eventually progressing up her arms, torso, face and posterior nape. She has seen dermatology in the past and biopsy showed signs of contact dermatitis. She stated she had improvement in the rash, particularly the facial component while she was on a 15 day course of Doxycycline BID in Feb 2024, and upon stopping, the rash has returned and is progressively worsening associated with pain, prutitis.     Per chart and dermatology pt was seen by Dermatology during [facility] admission in late October 2023, noted to have diffuse rash in the setting of fevers, hypotension, elevated inflammatory markers. Biopsy on 10/30 was consistent with dermal hypersensitivity reaction, possibly 2/2 viral process. Extensive infectious workup at that time was negative.    She was being evaluated by GI recently who found elevated ALP to 217 with isoenzymes showing 17% for macrohepatic isoenzymes and therefore was concerning for cholestatic liver disease associated rash. She was recommended for US Abd but was not able to complete it and is now admitted here to [facility]. GI requesting continuation of workup with possible MRI/MCRP. Dermatology consulted and repeated biopsy inpatient.     # Diffuse Upper body rash  -    - IgG outpatient elevated to 1662   - Dermatology repeated biopsy  - rheumatology evaluation, noted possible DM pending biopsy results   - GI evaluation per outpatient evaluation, recs noted, pending US Abd   - Recommend symptomatic treatment for now given pruritis   - f/u SPEP, JARRETT, Serum free light chains, serum immunoglobin levels, LDH, Beta-microglobulin (2.6)   - f/u biopsy results, ask to perform flow cytometry on sample if possible     # Elevated LFTs  - , progressively worsening compared to outpatient AST approx 60s last week   - Outpatient Alk phos was 191  - Coags wnl, Bilrubin normal, Hep C neg, antimitochondrial ab outpatient negative   -- Per GI outpatient Macrohepatic isoenzyme was 17%  - Outpt GI evaluated and recommending US Abd and MRI/MRCP   - GI evaluation as above, recs noted and appreciated   - f/u US Abd   - Continue to trend     # Elevated ESR  # Hx of iron deficiency   - Outpatient Ferritin 32,  sat 15%  - CARTER neg outpatient   - Rheumatology consul, recs noted     # Type II DM   - HbA1c 8.1, not well controlled       Thank you for allowing me to participate in the care of Ms. Reynolds please do not hesitate to call or text me if you have further questions or concerns.     Jessee Vela MD  [facility] [place]   Division of Hematology/Oncology  [facility], [address]  [place] [address]  P: [phone]  F: [phone]    Attestation:    ---- Pt evaluated including face-to-face interaction in addition to chart review, reviewing treatment plan, and managing the patient’s chronic diagnoses as listed in the assessment----

## 2024-03-20 NOTE — PROGRESS NOTE ADULT - ASSESSMENT
The patient is a  63F with a PMH of DM, presenting for a rash. GI consulted for abnormal ALP.    1. Rash   s/p repeat biopsy with derm on 3/18, path pending     2. Elevated inflammatory markers  CRP 17,   f/u Rheumatology recs    3. Abnormal ALP  ALP today is 182  CARTER, SMA, AMA, A1AT, iron panel, TSH, Celiac panel, CK, hepatitis A/B/C serologies - all negative     - abdominal US w/ duplex ordered   - pending US results, will decide on need for MRCP. pt has severe claustrophobia and would need anxiolytics prior to MRI.      I had a prolonged conversation with the patient regarding the hospital course, differential diagnosis, results of diagnostic tests this far, and therapeutic modalities available. Plan of care discussed with the patient after the evaluation. Patient expresses a clear understanding of the plan of care. Fifty minutes spent on the total encounter, of which more than fifty percent of the encounter was spent on counseling and/or coordinating care by the attending physician. Advanced care planning forms were discussed. Code status including forceful chest compressions, defibrillation and intubation were discussed. The risks benefits and alternatives to pertinent gastrointestinal procedures and interventions were discussed in detail and all questions were answered. Duration: 15 Minutes.      Hank Storm D.O.  Gastroenterology and Hepatology  [address] [place] [address], [address]  [place], [place]  Office: [phone]         The patient is a  63F with a PMH of DM, presenting for a rash. GI consulted for abnormal ALP.    1. Rash   s/p repeat biopsy with derm on 3/18, path pending     2. Elevated inflammatory markers  CRP 17,   f/u Rheumatology recs    3. Abnormal ALP  ALP today is 182  CARTER, SMA, AMA, A1AT, iron panel, TSH, Celiac panel, CK, hepatitis A/B/C serologies - all negative     - abdominal US w/ duplex reviewed   - recc MRCP w/wo contrast for better assessment... of note, pt has severe claustrophobia and would need anxiolytics prior to MRI.      I had a prolonged conversation with the patient regarding the hospital course, differential diagnosis, results of diagnostic tests this far, and therapeutic modalities available. Plan of care discussed with the patient after the evaluation. Patient expresses a clear understanding of the plan of care. Fifty minutes spent on the total encounter, of which more than fifty percent of the encounter was spent on counseling and/or coordinating care by the attending physician. Advanced care planning forms were discussed. Code status including forceful chest compressions, defibrillation and intubation were discussed. The risks benefits and alternatives to pertinent gastrointestinal procedures and interventions were discussed in detail and all questions were answered. Duration: 15 Minutes.      Hank Storm D.O.  Gastroenterology and Hepatology  [address] [place] [address], [address]  [place], [place]  Office: [phone]

## 2024-03-20 NOTE — PROGRESS NOTE ADULT - SUBJECTIVE AND OBJECTIVE BOX
INTERVAL HPI/OVERNIGHT EVENTS:    without new gi events   alk phos stable    MEDICATIONS  (STANDING):  atorvastatin 40 milliGRAM(s) Oral at bedtime  clobetasol 0.05% Ointment 1 Application(s) Topical two times a day  dextrose 5%. 1000 milliLiter(s) (50 mL/Hr) IV Continuous <Continuous>  dextrose 5%. 1000 milliLiter(s) (100 mL/Hr) IV Continuous <Continuous>  dextrose 50% Injectable 25 Gram(s) IV Push once  dextrose 50% Injectable 12.5 Gram(s) IV Push once  dextrose 50% Injectable 25 Gram(s) IV Push once  gabapentin 300 milliGRAM(s) Oral three times a day  glucagon  Injectable 1 milliGRAM(s) IntraMuscular once  influenza   Vaccine 0.5 milliLiter(s) IntraMuscular once  insulin lispro (ADMELOG) corrective regimen sliding scale   SubCutaneous three times a day before meals  lisinopril 20 milliGRAM(s) Oral daily    MEDICATIONS  (PRN):  dextrose Oral Gel 15 Gram(s) Oral once PRN Blood Glucose LESS THAN 70 milliGRAM(s)/deciliter      Allergies    penicillins (Swelling)    Intolerances        Review of Systems:    General:  No wt loss, fevers, chills, night sweats, fatigue   Eyes:  Good vision, no reported pain  ENT:  No sore throat, pain, runny nose, dysphagia  CV:  No pain, palpitations, hypo/hypertension  Resp:  No dyspnea, cough, tachypnea, wheezing  GI:  No pain, No nausea, No vomiting, No diarrhea, No constipation, No weight loss, No fever, No pruritis, No rectal bleeding, No melena, No dysphagia  :  No pain, bleeding, incontinence, nocturia  Muscle:  No pain, weakness  Neuro:  No weakness, tingling, memory problems  Psych:  No fatigue, insomnia, mood problems, depression  Endocrine:  No polyuria, polydypsia, cold/heat intolerance  Heme:  No petechiae, ecchymosis, easy bruisability  Skin:  No rash, tattoos, scars, edema      Vital Signs Last 24 Hrs  T(C): 36.5 (20 Mar 2024 11:02), Max: 36.7 (19 Mar 2024 18:27)  T(F): 97.7 (20 Mar 2024 11:02), Max: 98 (19 Mar 2024 18:27)  HR: 110 (20 Mar 2024 11:02) (94 - 110)  BP: 132/82 (20 Mar 2024 11:02) (94/58 - 132/82)  BP(mean): --  RR: 18 (20 Mar 2024 11:02) (18 - 18)  SpO2: 97% (20 Mar 2024 11:02) (97% - 99%)    Parameters below as of 20 Mar 2024 11:02  Patient On (Oxygen Delivery Method): room air        PHYSICAL EXAM:    Constitutional: NAD  HEENT: EOMI, throat clear  Neck: No LAD, supple  Respiratory: CTA and P  Cardiovascular: S1 and S2, RRR, no M  Gastrointestinal: BS+, soft, NT/ND, neg HSM,  Extremities: No peripheral edema, neg clubbing, cyanosis  Vascular: 2+ peripheral pulses  Neurological: A/O x 3, no focal deficits  Psychiatric: Normal mood, normal affect  Skin: No rashes      LABS:                        12.3   9.01  )-----------( 376      ( 20 Mar 2024 07:04 )             39.2     03-20    137  |  101  |  15  ----------------------------<  204<H>  4.8   |  22  |  0.74    Ca    10.1      20 Mar 2024 06:58    TPro  8.9<H>  /  Alb  4.2  /  TBili  0.3  /  DBili  <0.1  /  AST  15  /  ALT  34  /  AlkPhos  182<H>  03-20      Urinalysis Basic - ( 20 Mar 2024 06:58 )    Color: x / Appearance: x / SG: x / pH: x  Gluc: 204 mg/dL / Ketone: x  / Bili: x / Urobili: x   Blood: x / Protein: x / Nitrite: x   Leuk Esterase: x / RBC: x / WBC x   Sq Epi: x / Non Sq Epi: x / Bacteria: x        RADIOLOGY & ADDITIONAL TESTS:

## 2024-03-21 LAB
% GAMMA, URINE: 12.3 % — SIGNIFICANT CHANGE UP
ALBUMIN 24H MFR UR ELPH: 17.6 % — SIGNIFICANT CHANGE UP
ALPHA1 GLOB 24H MFR UR ELPH: 34.6 % — SIGNIFICANT CHANGE UP
ALPHA2 GLOB 24H MFR UR ELPH: 20.5 % — SIGNIFICANT CHANGE UP
ANION GAP SERPL CALC-SCNC: 12 MMOL/L — SIGNIFICANT CHANGE UP (ref 5–17)
B-GLOBULIN 24H MFR UR ELPH: 15 % — SIGNIFICANT CHANGE UP
BUN SERPL-MCNC: 15 MG/DL — SIGNIFICANT CHANGE UP (ref 7–23)
CALCIUM SERPL-MCNC: 10.1 MG/DL — SIGNIFICANT CHANGE UP (ref 8.4–10.5)
CHLORIDE SERPL-SCNC: 105 MMOL/L — SIGNIFICANT CHANGE UP (ref 96–108)
CO2 SERPL-SCNC: 22 MMOL/L — SIGNIFICANT CHANGE UP (ref 22–31)
COLLECT DURATION TIME UR: 24 HR — SIGNIFICANT CHANGE UP
CREAT SERPL-MCNC: 0.62 MG/DL — SIGNIFICANT CHANGE UP (ref 0.5–1.3)
CREATININE, URINE RESULT: 63 MG/DL — SIGNIFICANT CHANGE UP
EGFR: 101 ML/MIN/1.73M2 — SIGNIFICANT CHANGE UP
GLUCOSE BLDC GLUCOMTR-MCNC: 248 MG/DL — HIGH (ref 70–99)
GLUCOSE BLDC GLUCOMTR-MCNC: 292 MG/DL — HIGH (ref 70–99)
GLUCOSE BLDC GLUCOMTR-MCNC: 293 MG/DL — HIGH (ref 70–99)
GLUCOSE BLDC GLUCOMTR-MCNC: 325 MG/DL — HIGH (ref 70–99)
GLUCOSE SERPL-MCNC: 233 MG/DL — HIGH (ref 70–99)
HAV IGM SER-ACNC: SIGNIFICANT CHANGE UP
HBV CORE IGM SER-ACNC: SIGNIFICANT CHANGE UP
HBV SURFACE AG SER-ACNC: SIGNIFICANT CHANGE UP
HCT VFR BLD CALC: 35 % — SIGNIFICANT CHANGE UP (ref 34.5–45)
HCV AB S/CO SERPL IA: 0.09 S/CO — SIGNIFICANT CHANGE UP (ref 0–0.99)
HCV AB SERPL-IMP: SIGNIFICANT CHANGE UP
HGB BLD-MCNC: 11.3 G/DL — LOW (ref 11.5–15.5)
INTERPRETATION 24H UR IFE-IMP: SIGNIFICANT CHANGE UP
KAPPA LC SER QL IFE: 4.31 MG/DL — HIGH (ref 0.33–1.94)
KAPPA/LAMBDA FREE LIGHT CHAIN RATIO, SERUM: 1.36 RATIO — SIGNIFICANT CHANGE UP (ref 0.26–1.65)
LAMBDA LC SER QL IFE: 3.16 MG/DL — HIGH (ref 0.57–2.63)
M PROTEIN 24H UR ELPH-MRATE: 0 % — SIGNIFICANT CHANGE UP
M PROTEIN 24H UR ELPH-MRATE: 0 MG/24HR — SIGNIFICANT CHANGE UP (ref 0–0)
M PROTEIN 24H UR ELPH-MRATE: 0 MG/DL — SIGNIFICANT CHANGE UP
MCHC RBC-ENTMCNC: 25.9 PG — LOW (ref 27–34)
MCHC RBC-ENTMCNC: 32.3 GM/DL — SIGNIFICANT CHANGE UP (ref 32–36)
MCV RBC AUTO: 80.1 FL — SIGNIFICANT CHANGE UP (ref 80–100)
MRSA PCR RESULT.: SIGNIFICANT CHANGE UP
NRBC # BLD: 0 /100 WBCS — SIGNIFICANT CHANGE UP (ref 0–0)
PLATELET # BLD AUTO: 349 K/UL — SIGNIFICANT CHANGE UP (ref 150–400)
POTASSIUM SERPL-MCNC: 4.7 MMOL/L — SIGNIFICANT CHANGE UP (ref 3.5–5.3)
POTASSIUM SERPL-SCNC: 4.7 MMOL/L — SIGNIFICANT CHANGE UP (ref 3.5–5.3)
PROT ?TM UR-MCNC: 5 MG/DL — SIGNIFICANT CHANGE UP (ref 0–12)
PROT ?TM UR-MCNC: 5 MG/DL — SIGNIFICANT CHANGE UP (ref 0–12)
PROT PATTERN 24H UR ELPH-IMP: SIGNIFICANT CHANGE UP
PROTEIN QUANT CALC, URINE: 65 MG/24 H — SIGNIFICANT CHANGE UP (ref 50–100)
RBC # BLD: 4.37 M/UL — SIGNIFICANT CHANGE UP (ref 3.8–5.2)
RBC # FLD: 13.2 % — SIGNIFICANT CHANGE UP (ref 10.3–14.5)
S AUREUS DNA NOSE QL NAA+PROBE: SIGNIFICANT CHANGE UP
SODIUM SERPL-SCNC: 139 MMOL/L — SIGNIFICANT CHANGE UP (ref 135–145)
TOTAL VOLUME - 24 HOUR: 1300 ML — SIGNIFICANT CHANGE UP
URINE CREATININE CALCULATION: 0.8 G/24 H — SIGNIFICANT CHANGE UP (ref 0.8–1.8)
WBC # BLD: 9.32 K/UL — SIGNIFICANT CHANGE UP (ref 3.8–10.5)
WBC # FLD AUTO: 9.32 K/UL — SIGNIFICANT CHANGE UP (ref 3.8–10.5)

## 2024-03-21 PROCEDURE — 99233 SBSQ HOSP IP/OBS HIGH 50: CPT | Mod: GC

## 2024-03-21 PROCEDURE — 74183 MRI ABD W/O CNTR FLWD CNTR: CPT | Mod: 26

## 2024-03-21 PROCEDURE — 99233 SBSQ HOSP IP/OBS HIGH 50: CPT

## 2024-03-21 RX ORDER — ACETAMINOPHEN 500 MG
650 TABLET ORAL EVERY 6 HOURS
Refills: 0 | Status: DISCONTINUED | OUTPATIENT
Start: 2024-03-21 | End: 2024-03-22

## 2024-03-21 RX ADMIN — Medication 3: at 11:23

## 2024-03-21 RX ADMIN — LISINOPRIL 20 MILLIGRAM(S): 2.5 TABLET ORAL at 05:24

## 2024-03-21 RX ADMIN — Medication 1 APPLICATION(S): at 21:25

## 2024-03-21 RX ADMIN — Medication 1 APPLICATION(S): at 05:25

## 2024-03-21 RX ADMIN — Medication 4: at 17:35

## 2024-03-21 RX ADMIN — GABAPENTIN 300 MILLIGRAM(S): 400 CAPSULE ORAL at 13:03

## 2024-03-21 RX ADMIN — GABAPENTIN 300 MILLIGRAM(S): 400 CAPSULE ORAL at 21:24

## 2024-03-21 RX ADMIN — Medication 2: at 07:55

## 2024-03-21 RX ADMIN — GABAPENTIN 300 MILLIGRAM(S): 400 CAPSULE ORAL at 05:24

## 2024-03-21 RX ADMIN — Medication 650 MILLIGRAM(S): at 09:15

## 2024-03-21 RX ADMIN — ATORVASTATIN CALCIUM 40 MILLIGRAM(S): 80 TABLET, FILM COATED ORAL at 21:24

## 2024-03-21 RX ADMIN — CHLORHEXIDINE GLUCONATE 1 APPLICATION(S): 213 SOLUTION TOPICAL at 11:24

## 2024-03-21 RX ADMIN — Medication 650 MILLIGRAM(S): at 10:15

## 2024-03-21 NOTE — PROGRESS NOTE ADULT - SUBJECTIVE AND OBJECTIVE BOX
[facility] HEMATOLOGY/ONCOLOGY INPATIENT PROGRESS NOTE     Interval Hx:   03-21-24: Ms. Reynolds was seen at bedside today.    Meds:   MEDICATIONS  (STANDING):  atorvastatin 40 milliGRAM(s) Oral at bedtime  chlorhexidine 2% Cloths 1 Application(s) Topical daily  clobetasol 0.05% Ointment 1 Application(s) Topical two times a day  dextrose 5%. 1000 milliLiter(s) (100 mL/Hr) IV Continuous <Continuous>  dextrose 5%. 1000 milliLiter(s) (50 mL/Hr) IV Continuous <Continuous>  dextrose 50% Injectable 25 Gram(s) IV Push once  dextrose 50% Injectable 12.5 Gram(s) IV Push once  dextrose 50% Injectable 25 Gram(s) IV Push once  gabapentin 300 milliGRAM(s) Oral three times a day  glucagon  Injectable 1 milliGRAM(s) IntraMuscular once  influenza   Vaccine 0.5 milliLiter(s) IntraMuscular once  insulin lispro (ADMELOG) corrective regimen sliding scale   SubCutaneous three times a day before meals  lisinopril 20 milliGRAM(s) Oral daily    MEDICATIONS  (PRN):  dextrose Oral Gel 15 Gram(s) Oral once PRN Blood Glucose LESS THAN 70 milliGRAM(s)/deciliter    Vital Signs Last 24 Hrs  T(C): 36.4 (21 Mar 2024 04:37), Max: 36.8 (20 Mar 2024 20:20)  T(F): 97.6 (21 Mar 2024 04:37), Max: 98.2 (20 Mar 2024 20:20)  HR: 86 (21 Mar 2024 04:37) (86 - 118)  BP: 117/69 (21 Mar 2024 04:37) (112/77 - 161/80)  BP(mean): --  RR: 18 (21 Mar 2024 04:37) (18 - 18)  SpO2: 98% (21 Mar 2024 04:37) (97% - 98%)    Parameters below as of 21 Mar 2024 04:37  Patient On (Oxygen Delivery Method): room air    Physical Exam:  Gen: mild distress/upset due to rash   HEENT: EOMI, MMM, L eye is injected   Chest: Equal chest rise, speaking full sentences   Cardiac: RR  Abd: Nondistended   Ext: No edema   Neuro: AAOx3, normal/depressed mood and affect   Integument: diffuse maculopapular rash confluent over dital upper extremities with darkening of skin, edematous pink papules over proximal upper extremities extending to plaque like over top of back and nape of neck with dry skin     Labs:                        12.3   9.01  )-----------( 376      ( 20 Mar 2024 07:04 )             39.2     CBC Full  -  ( 20 Mar 2024 07:04 )  WBC Count : 9.01 K/uL  RBC Count : 4.84 M/uL  Hemoglobin : 12.3 g/dL  Hematocrit : 39.2 %  Platelet Count - Automated : 376 K/uL  Mean Cell Volume : 81.0 fl  Mean Cell Hemoglobin : 25.4 pg  Mean Cell Hemoglobin Concentration : 31.4 gm/dL    03-20    137  |  101  |  15  ----------------------------<  204<H>  4.8   |  22  |  0.74    Ca    10.1      20 Mar 2024 06:58    TPro  7.2  /  Alb  x   /  TBili  x   /  DBili  x   /  AST  x   /  ALT  x   /  AlkPhos  x   03-20      Bilirubin Total: 0.3 mg/dL (03-20-24 @ 06:58)   [facility] HEMATOLOGY/ONCOLOGY INPATIENT PROGRESS NOTE     Interval Hx:   03-21-24: Ms. Reynolds was seen at bedside today, stated her rash has improved somewhat since admission, pruritis continues but also improved, noted US Abd with normal liver and spleen, pending MRCP per GI, skin biopsy results pending, noted Urine FLC ratio >9, serum FLCs wnl, pending complete paraproteinemia workup     Meds:   MEDICATIONS  (STANDING):  atorvastatin 40 milliGRAM(s) Oral at bedtime  chlorhexidine 2% Cloths 1 Application(s) Topical daily  clobetasol 0.05% Ointment 1 Application(s) Topical two times a day  dextrose 5%. 1000 milliLiter(s) (100 mL/Hr) IV Continuous <Continuous>  dextrose 5%. 1000 milliLiter(s) (50 mL/Hr) IV Continuous <Continuous>  dextrose 50% Injectable 25 Gram(s) IV Push once  dextrose 50% Injectable 12.5 Gram(s) IV Push once  dextrose 50% Injectable 25 Gram(s) IV Push once  gabapentin 300 milliGRAM(s) Oral three times a day  glucagon  Injectable 1 milliGRAM(s) IntraMuscular once  influenza   Vaccine 0.5 milliLiter(s) IntraMuscular once  insulin lispro (ADMELOG) corrective regimen sliding scale   SubCutaneous three times a day before meals  lisinopril 20 milliGRAM(s) Oral daily    MEDICATIONS  (PRN):  dextrose Oral Gel 15 Gram(s) Oral once PRN Blood Glucose LESS THAN 70 milliGRAM(s)/deciliter    Vital Signs Last 24 Hrs  T(C): 36.4 (21 Mar 2024 04:37), Max: 36.8 (20 Mar 2024 20:20)  T(F): 97.6 (21 Mar 2024 04:37), Max: 98.2 (20 Mar 2024 20:20)  HR: 86 (21 Mar 2024 04:37) (86 - 118)  BP: 117/69 (21 Mar 2024 04:37) (112/77 - 161/80)  BP(mean): --  RR: 18 (21 Mar 2024 04:37) (18 - 18)  SpO2: 98% (21 Mar 2024 04:37) (97% - 98%)    Parameters below as of 21 Mar 2024 04:37  Patient On (Oxygen Delivery Method): room air    Physical Exam:  Gen: mild distress/upset due to rash   HEENT: EOMI, MMM, L eye is injected   Chest: Equal chest rise, speaking full sentences   Cardiac: RR  Abd: Nondistended   Ext: No edema   Neuro: AAOx3, normal/depressed mood and affect   Integument: diffuse maculopapular rash confluent over distal upper extremities with darkening of skin, edematous pink papules over proximal upper extremities extending to plaque like over top of back and nape of neck with dry skin     Labs:                        12.3   9.01  )-----------( 376      ( 20 Mar 2024 07:04 )             39.2     CBC Full  -  ( 20 Mar 2024 07:04 )  WBC Count : 9.01 K/uL  RBC Count : 4.84 M/uL  Hemoglobin : 12.3 g/dL  Hematocrit : 39.2 %  Platelet Count - Automated : 376 K/uL  Mean Cell Volume : 81.0 fl  Mean Cell Hemoglobin : 25.4 pg  Mean Cell Hemoglobin Concentration : 31.4 gm/dL    03-20    137  |  101  |  15  ----------------------------<  204<H>  4.8   |  22  |  0.74    Ca    10.1      20 Mar 2024 06:58    TPro  7.2  /  Alb  x   /  TBili  x   /  DBili  x   /  AST  x   /  ALT  x   /  AlkPhos  x   03-20      Bilirubin Total: 0.3 mg/dL (03-20-24 @ 06:58)

## 2024-03-21 NOTE — PROGRESS NOTE ADULT - ASSESSMENT
The patient is a  63F with a PMH of DM, presenting for a rash. GI consulted for abnormal ALP.    1. Rash   s/p repeat biopsy with derm on 3/18, path pending     2. Elevated inflammatory markers  CRP 17,   f/u Rheumatology recs    3. Abnormal ALP  Last   CARTER, SMA, AMA, A1AT, iron panel, TSH, Celiac panel, CK, hepatitis A/B/C serologies - all negative     - abdominal US w/ duplex reviewed   -pending MRCP      I had a prolonged conversation with the patient regarding the hospital course, differential diagnosis, results of diagnostic tests this far, and therapeutic modalities available. Plan of care discussed with the patient after the evaluation. Patient expresses a clear understanding of the plan of care. Fifty minutes spent on the total encounter, of which more than fifty percent of the encounter was spent on counseling and/or coordinating care by the attending physician. Advanced care planning forms were discussed. Code status including forceful chest compressions, defibrillation and intubation were discussed. The risks benefits and alternatives to pertinent gastrointestinal procedures and interventions were discussed in detail and all questions were answered. Duration: 15 Minutes.      Hank Storm D.O.  Gastroenterology and Hepatology  [address] [place] [address], [address]  [place], [place]  Office: [phone]

## 2024-03-21 NOTE — PROGRESS NOTE ADULT - ASSESSMENT
# Pruritic rash, preliminary biopsy review consistent with granulomatous process  - Ddx includes connective tissue disease (though GI outpatient labs negative for CARTER, CK 1 week ago). However, agree with Rheumatology that distribution of rash is consistent with that of DM  - Other ddx: granulomatous (sarcoid vs. infectious)  - For BODY: continue clobetasol 0.05% ointment BID to affected areas on BODY (Avoid face, groin, skin folds) PRN itch for up to 2 weeks at a time, with at least 1 week break before resuming as needed. Please order 5 tubes as patient has large body surface area involved.  - For FACE: start hydrocortisone 2.5% ointment BID PRN itch to affected areas for up to 2 weeks at a time  - Follow-up official pathology report for skin biopsy performed 3/18. Wound care with daily vaseline and band-aid placement  - Please send rheumatoid factor, syphilis screen (T Pallidum), quant gold, stool ova and parasites, further CTD workup per Rheumatology    Thank you for this consult. Patient was seen and reviewed with attending dermatologist Dr. Hickman    Dermatology will continue to follow. Please page [phone] with a 10-digit call-back number for further related questions.    Deanne Rivas MD  Resident Physician, PGY-2   [facility] Dermatology   Pager: [phone] # Pruritic rash, preliminary biopsy review consistent with granulomatous process  - Ddx includes connective tissue disease (though GI outpatient labs negative for CARTER, CK 1 week ago). However, agree with Rheumatology that distribution of rash is consistent with that of DM  - Other ddx: granulomatous (sarcoid vs. infectious)  - For BODY: continue clobetasol 0.05% ointment BID to affected areas on BODY (Avoid face, groin, skin folds) PRN itch for up to 2 weeks at a time, with at least 1 week break before resuming as needed. Please order 5 tubes as patient has large body surface area involved.  - For FACE: start hydrocortisone 2.5% ointment BID PRN itch to affected areas for up to 2 weeks at a time  - Follow-up official pathology report for skin biopsy performed 3/18. Wound care with daily vaseline and band-aid placement  - Please send rheumatoid factor, syphilis screen (T Pallidum), quant gold, stool ova and parasites, further CTD workup per Rheumatology    Thank you for this consult. Patient was seen and reviewed with attending dermatologist Dr. Hickman  Please page [phone] with a 10-digit call-back number for further related questions.    Patient can follow up in outpatient Dermatology clinic within 1 week of discharge with Dr. Hickman at [address] [facility] [address], [place] [address]. The office number is [phone] if patient does not hear from us to coordinate appointment over the next few days.    Deanne Rivas MD  Resident Physician, PGY-2   [facility] Dermatology   Pager: [phone]

## 2024-03-21 NOTE — PROGRESS NOTE ADULT - ASSESSMENT
63 F with PMH of T2DM, HLD, thyroid nodule, palpitations, ZIYAD/ BSO, and SBO 2/2 adhesions presenting with acute worsening of chronic rash that first appeared on the forearms and has since spread up the arms to her torso, face, and posterior neck. Patient also noted to have tachycardia to 110s, elevated inflammatory markers, elevated alk phos.   Rheumatology consulted for further evaluation to r/o CTD.    #Rash - coalescing erythematous and edematous papules, pruritic  -The distribution of rash is suggestive of DM, however biopsy showing dermal granulomas; differential includes sarcoidosis, infectious  -Patient not endorsing any fevers/chills, weight loss, night sweats, joint pain or swelling, eye pain or redness, oral ulcers, vision changes, urinary changes, or other symptoms outside of spread of rash and itch/pain  #Elevated inflammatory markers- r/o underlying CTD    -Recommend checking Bebe-1 and Mi-2 antibodies, SS antibodies, vit D 25-OH and 1,25-OH, serum ACE  -F/u CARTER and RF  -Continue topical steroids for symptoms per dermatology  -Tx course will be determined after the dx is established      63 F with PMH of T2DM, HLD, thyroid nodule, palpitations, ZIYAD/ BSO, and SBO 2/2 adhesions presenting with acute worsening of chronic rash that first appeared on the forearms and has since spread up the arms to her torso, face, and posterior neck. Patient also noted to have tachycardia to 110s, elevated inflammatory markers, elevated alk phos.   Rheumatology consulted for further evaluation to r/o CTD.    # Rash - coalescing erythematous and edematous papules, pruritic  - preliminary pathology : granulomatous inflammation  - while the distribution of rash is suggestive of DM, however biopsy is not consistent with DM. Differential includes sarcoidosis, infectious  - Patient not endorsing any fevers/chills, weight loss, night sweats, joint pain or swelling, eye pain or redness, oral ulcers, vision changes, urinary changes, or other symptoms outside of spread of rash and itch/pain  # Elevated inflammatory markers- r/o underlying CTD    - Recommend checking  vit D 25-OH and 1,25-OH, serum ACE  - F/u CARTER and RF, mymarker3  - Continue topical steroids for symptoms per dermatology  - CT chest as part of evaluation for sarcoidosis  - Tx course will be determined after the dx is established     D/q rheumatology team.

## 2024-03-21 NOTE — PROGRESS NOTE ADULT - ASSESSMENT
This is a 63 year old female PMH: HLD and DM2. presents to [facility] for a progressing painful rash x 2 months.    Plan:    # Rash:  - Sed rate 120, CRP 17  - CXR negative  - Per Derm-> start clobetasol 0.05% ointment BID to affected areas on BODY (Avoid face, groin, skin folds)  - Follow-up punch biopsy from L upper arm performed 3/18. Wound care with daily vaseline and band-aid placement  - F/u CARTER, rheumatoid factor, HIV, acute hepatitis panel, SPEP, UPEP, serum light chains, urine light chains  - Derm/Rheum following    # Sinus Tachy:  - Monitor on tele    # Elevated LFTs/ Hep B/A  - Outpatient hepatology workup reviewed; ALP ~ 290, elevated . ALP is 180  - CARTER, SMA, AMA, A1AT, iron panel, TSH, Celiac panel, CK, hepatitis A/B/C serologies - all negative   - Abdominal US w/ duplex with normal liver and spleen, pending MRCP per GI for better assessment   - GI following    # HLD:  - C/w current meds    # DM2:  - HgbA1c 8.1%  - Continue to monitor blood glucose levels  - Sliding Scale    # GI ppx:  - Bowel regimen prn    # DVT ppx:  - IPC's    [facility]

## 2024-03-21 NOTE — PROGRESS NOTE ADULT - SUBJECTIVE AND OBJECTIVE BOX
INTERVAL HPI/OVERNIGHT EVENTS:    Rash on body continuing to improve in intensity, still intermittently itchy. Using topical clobetasol 1-2x per day. Requesting topical for face lesions. On discussion notes that her upper eyelids have appeared darker than normal baseline appearance over past couple months. Anxious about MR for further liver evaluation.    Denies fever/chills, joint pains, vision changes, cough, shortness of breath, abdominal pain, extremity swelling.    MEDICATIONS  (STANDING):  atorvastatin 40 milliGRAM(s) Oral at bedtime  chlorhexidine 2% Cloths 1 Application(s) Topical daily  clobetasol 0.05% Ointment 1 Application(s) Topical two times a day  dextrose 5%. 1000 milliLiter(s) (50 mL/Hr) IV Continuous <Continuous>  dextrose 5%. 1000 milliLiter(s) (100 mL/Hr) IV Continuous <Continuous>  dextrose 50% Injectable 25 Gram(s) IV Push once  dextrose 50% Injectable 12.5 Gram(s) IV Push once  dextrose 50% Injectable 25 Gram(s) IV Push once  gabapentin 300 milliGRAM(s) Oral three times a day  glucagon  Injectable 1 milliGRAM(s) IntraMuscular once  influenza   Vaccine 0.5 milliLiter(s) IntraMuscular once  insulin lispro (ADMELOG) corrective regimen sliding scale   SubCutaneous three times a day before meals  lisinopril 20 milliGRAM(s) Oral daily  LORazepam     Tablet 1 milliGRAM(s) Oral once    MEDICATIONS  (PRN):  acetaminophen     Tablet .. 650 milliGRAM(s) Oral every 6 hours PRN Mild Pain (1 - 3)  dextrose Oral Gel 15 Gram(s) Oral once PRN Blood Glucose LESS THAN 70 milliGRAM(s)/deciliter      Allergies    penicillins (Swelling)    Intolerances        REVIEW OF SYSTEMS    General: no fevers/chills, no lethargy  	  Skin/Breast: see HPI	    Ophthalmologic: no eye pain or change in vision	    ENMT: no dysphagia or change in hearing    Respiratory and Thorax: no SOB or cough    Cardiovascular: no palpitations or chest pain    Gastrointestinal: no abdominal pain or blood in stool     Genitourinary: no dysuria or frequency    Musculoskeletal: no joint pains or weakness	    Neurological: no weakness, numbness, or tingling      Vital Signs Last 24 Hrs  T(C): 36.5 (21 Mar 2024 11:05), Max: 36.8 (20 Mar 2024 20:20)  T(F): 97.7 (21 Mar 2024 11:05), Max: 98.2 (20 Mar 2024 20:20)  HR: 93 (21 Mar 2024 16:58) (86 - 118)  BP: 128/79 (21 Mar 2024 16:58) (106/63 - 161/80)  BP(mean): --  RR: 18 (21 Mar 2024 17:46) (18 - 18)  SpO2: 98% (21 Mar 2024 17:46) (97% - 98%)    Parameters below as of 21 Mar 2024 17:46  Patient On (Oxygen Delivery Method): room air          PHYSICAL EXAM:    General: Well appearing, well nourished, in no apparent distress  Lymph: No visible lymphadenopathy  Skin: The scalp/hair, head/face, conjunctivae/lids, lips/teeth/gums, neck, digits/nails, right and left axilla, right and left upper and lower extremities, chest, abdomen, back, buttocks and groin area. No bromhidrosis or hyperhidrosis.    Of note on skin exam:  Hyperpigmented patches over bilateral upper eyelids  slightly improved edematous pink papules and papules coalescing into lichenified plaques on bilateral upper extremities, scattered papules on dorsal hands, central chest and neck, abdomen, upper cutaneous lip and cheeks  Occipital scalp and posterior neck with circumscribed pink scaly plaque      LABS:                        11.3   9.32  )-----------( 349      ( 21 Mar 2024 06:50 )             35.0     CBC Full  -  ( 21 Mar 2024 06:50 )  WBC Count : 9.32 K/uL  RBC Count : 4.37 M/uL  Hemoglobin : 11.3 g/dL  Hematocrit : 35.0 %  Platelet Count - Automated : 349 K/uL  Mean Cell Volume : 80.1 fl  Mean Cell Hemoglobin : 25.9 pg  Mean Cell Hemoglobin Concentration : 32.3 gm/dL  Auto Neutrophil # : x  Auto Lymphocyte # : x  Auto Monocyte # : x  Auto Eosinophil # : x  Auto Basophil # : x  Auto Neutrophil % : x  Auto Lymphocyte % : x  Auto Monocyte % : x  Auto Eosinophil % : x  Auto Basophil % : x    03-21    139  |  105  |  15  ----------------------------<  233<H>  4.7   |  22  |  0.62    Ca    10.1      21 Mar 2024 06:48    TPro  7.2  /  Alb  x   /  TBili  x   /  DBili  x   /  AST  x   /  ALT  x   /  AlkPhos  x   03-20      XR CHEST PA LAT 2V   ORDERED BY:  SALVATORE GOMEZ     PROCEDURE DATE:  03/18/2024          INTERPRETATION:  EXAMINATION: XR CHEST PA AND LATERAL    CLINICAL INDICATION: Tachycardia.    TECHNIQUE: 2 views; Frontal and lateral views of the chest were obtained.    COMPARISON: 10/30/2023.    FINDINGS:    The heart is normal in size.  The lungs are clear.  There is no pneumothorax or pleural effusion.  No acute bony abnormality.    IMPRESSION:  No acute findings in the chest.    --- End of Report ---    Immunoglobulin Free Light Chains, Serum (03.20.24 @ 15:02)   JARRETT Kappa: 4.31 mg/dL  JARRETT Lambda: 3.16 mg/dL    Immunoglobulins Panel (03.19.24 @ 15:59)   JARRETT Kappa: 5.05 mg/dL  JARRETT Lambda: 3.71 mg/dL    Protein Electrophoresis, Serum (03.20.24 @ 15:02)   Protein Total: 7.2 g/dL    Protein Electrophoresis, Urine (03.20.24 @ 20:56)   Total Protein, Random Urine: 5 mg/dL  Total Volume - 24 Hour: 1300 mL  Interval Timed: 24 HR  Total Protein, Urine: 5 mg/dL  % Albumin, Urine: 17.6 %  % Alpha 1, Urine: 34.6 %  % Alpha 2, Urine: 20.5 %  % Beta, Urine: 15.0 %  % Gamma, Urine: 12.3 %  % M Radames, 24 Hour Urine: 0.0 %  Bence Haines/Protein, Urine: 0.0 mg/dL  Bence Haines Excretion: 0.00 mg/24hr  Immunofixation, Urine: No Monoclonal Band Identified   Reference Range: None Detected  Urine Electrophoresis Interpretation: Normal Electrophoresis Pattern  Creatinine, Urine Result: 63 mg/dL  Urine Creatinine Calculation: 0.8 g/24 h  Protein Quant Calc, Urine: 65 mg/24 hFree Kappa and Lambda Light Chains, Urine (03.19.24 @ 19:13)   [place] Free Light Chains, Urine: 36.09 mg/L  Lambda Free Light Chains, Urine: 3.90 mg/L  Kappa/Lambda Free Light Chain Ratio, Urine: 9.25

## 2024-03-21 NOTE — PROGRESS NOTE ADULT - SUBJECTIVE AND OBJECTIVE BOX
INTERVAL:  SUBJECTIVE: Patient examined bedside this AM.    OBJECTIVE:  ICU Vital Signs Last 24 Hrs  T(C): 36.5 (21 Mar 2024 11:05), Max: 36.8 (20 Mar 2024 20:20)  T(F): 97.7 (21 Mar 2024 11:05), Max: 98.2 (20 Mar 2024 20:20)  HR: 98 (21 Mar 2024 11:05) (86 - 118)  BP: 106/63 (21 Mar 2024 11:05) (106/63 - 161/80)  BP(mean): --  ABP: --  ABP(mean): --  RR: 18 (21 Mar 2024 11:05) (18 - 18)  SpO2: 97% (21 Mar 2024 11:05) (97% - 98%)    O2 Parameters below as of 21 Mar 2024 11:05  Patient On (Oxygen Delivery Method): room air              03-20 @ 07:01  -  03-21 @ 07:00  --------------------------------------------------------  IN: 240 mL / OUT: 500 mL / NET: -260 mL    03-21 @ 07:01 - 03-21 @ 13:58  --------------------------------------------------------  IN: 480 mL / OUT: 250 mL / NET: 230 mL      CAPILLARY BLOOD GLUCOSE      POCT Blood Glucose.: 292 mg/dL (21 Mar 2024 11:19)      PHYSICAL EXAM:  General: NAD, laying in bed  HEENT: PERRLA, EOMI, sclera non-icteric  Neck: JVD absent  Respiratory: Clear to ascultation bilaterally, no crackles/rales, no accessory muscle use  Cardiovascular: RRR, no murmurs/rubs/gallops  Abdomen: Soft, NT, ND  Extremities: No LE edema  Skin: No rashes or lesions   Neurological: Sensation grossly intact, strength 5/5 in all extremities  Psychiatry: AOx3, appropriate insight/judgement, appropriate affect, recent/remote memory intact    PRN Meds:  acetaminophen     Tablet .. 650 milliGRAM(s) Oral every 6 hours PRN  dextrose Oral Gel 15 Gram(s) Oral once PRN      LABS:                        11.3   9.32  )-----------( 349      ( 21 Mar 2024 06:50 )             35.0     Hgb Trend: 11.3<--, 12.3<--, 12.0<--, 12.0<--  03-21    139  |  105  |  15  ----------------------------<  233<H>  4.7   |  22  |  0.62    Ca    10.1      21 Mar 2024 06:48    TPro  7.2  /  Alb  x   /  TBili  x   /  DBili  x   /  AST  x   /  ALT  x   /  AlkPhos  x   03-20    Creatinine Trend: 0.62<--, 0.74<--, 1.17<--, 0.63<--    Urinalysis Basic - ( 21 Mar 2024 06:48 )    Color: x / Appearance: x / SG: x / pH: x  Gluc: 233 mg/dL / Ketone: x  / Bili: x / Urobili: x   Blood: x / Protein: x / Nitrite: x   Leuk Esterase: x / RBC: x / WBC x   Sq Epi: x / Non Sq Epi: x / Bacteria: x            MICROBIOLOGY:       RADIOLOGY:  [ ] Reviewed and interpreted by me    EKG: INTERVAL: APPLE. Rash improving w/ topicals.  SUBJECTIVE: Patient examined bedside this AM.    OBJECTIVE:  ICU Vital Signs Last 24 Hrs  T(C): 36.5 (21 Mar 2024 11:05), Max: 36.8 (20 Mar 2024 20:20)  T(F): 97.7 (21 Mar 2024 11:05), Max: 98.2 (20 Mar 2024 20:20)  HR: 98 (21 Mar 2024 11:05) (86 - 118)  BP: 106/63 (21 Mar 2024 11:05) (106/63 - 161/80)  BP(mean): --  ABP: --  ABP(mean): --  RR: 18 (21 Mar 2024 11:05) (18 - 18)  SpO2: 97% (21 Mar 2024 11:05) (97% - 98%)    O2 Parameters below as of 21 Mar 2024 11:05  Patient On (Oxygen Delivery Method): room air              03-20 @ 07:01 - 03-21 @ 07:00  --------------------------------------------------------  IN: 240 mL / OUT: 500 mL / NET: -260 mL    03-21 @ 07:01  -  03-21 @ 13:58  --------------------------------------------------------  IN: 480 mL / OUT: 250 mL / NET: 230 mL      CAPILLARY BLOOD GLUCOSE      POCT Blood Glucose.: 292 mg/dL (21 Mar 2024 11:19)      PHYSICAL EXAM:  General: No apparent distress  HEENT: EOMI, MMM  CVS: +S1/S2, RRR, no murmurs/rubs/gallops  Resp: CTA b/l. No crackles/wheezing  GI: Soft, NT/ND +BS  MSK: no swelling/warmth/erythema of the joints of the UE/LE  Neuro: AAOx3  Skin: pink edematous coalescing papules and plaques on the forearms, back, posterior neck; some lichenified areas    PRN Meds:  acetaminophen     Tablet .. 650 milliGRAM(s) Oral every 6 hours PRN  dextrose Oral Gel 15 Gram(s) Oral once PRN      LABS:                        11.3   9.32  )-----------( 349      ( 21 Mar 2024 06:50 )             35.0     Hgb Trend: 11.3<--, 12.3<--, 12.0<--, 12.0<--  03-21    139  |  105  |  15  ----------------------------<  233<H>  4.7   |  22  |  0.62    Ca    10.1      21 Mar 2024 06:48    TPro  7.2  /  Alb  x   /  TBili  x   /  DBili  x   /  AST  x   /  ALT  x   /  AlkPhos  x   03-20    Creatinine Trend: 0.62<--, 0.74<--, 1.17<--, 0.63<--    Urinalysis Basic - ( 21 Mar 2024 06:48 )    Color: x / Appearance: x / SG: x / pH: x  Gluc: 233 mg/dL / Ketone: x  / Bili: x / Urobili: x   Blood: x / Protein: x / Nitrite: x   Leuk Esterase: x / RBC: x / WBC x   Sq Epi: x / Non Sq Epi: x / Bacteria: x            MICROBIOLOGY:       RADIOLOGY:  [ ] Reviewed and interpreted by me    EKG: INTERVAL: APPLE. Rash improving w/ topicals.  SUBJECTIVE: Patient examined bedside this AM.    OBJECTIVE:  ICU Vital Signs Last 24 Hrs  T(C): 36.5 (21 Mar 2024 11:05), Max: 36.8 (20 Mar 2024 20:20)  T(F): 97.7 (21 Mar 2024 11:05), Max: 98.2 (20 Mar 2024 20:20)  HR: 98 (21 Mar 2024 11:05) (86 - 118)  BP: 106/63 (21 Mar 2024 11:05) (106/63 - 161/80)  BP(mean): --  ABP: --  ABP(mean): --  RR: 18 (21 Mar 2024 11:05) (18 - 18)  SpO2: 97% (21 Mar 2024 11:05) (97% - 98%)    O2 Parameters below as of 21 Mar 2024 11:05  Patient On (Oxygen Delivery Method): room air              03-20 @ 07:01 - 03-21 @ 07:00  --------------------------------------------------------  IN: 240 mL / OUT: 500 mL / NET: -260 mL    03-21 @ 07:01  -  03-21 @ 13:58  --------------------------------------------------------  IN: 480 mL / OUT: 250 mL / NET: 230 mL      CAPILLARY BLOOD GLUCOSE      POCT Blood Glucose.: 292 mg/dL (21 Mar 2024 11:19)      PHYSICAL EXAM:  General: No apparent distress  HEENT: EOMI, MMM  CVS: +S1/S2, RRR, no murmurs/rubs/gallops  Resp: CTA b/l. No crackles/wheezing  GI: Soft, NT/ND +BS  MSK: no swelling/warmth/erythema of the joints of the UE/LE  Neuro: AAOx3  Skin: pink edematous coalescing papules and plaques on the forearms, back, posterior neck; some lichenified areas    PRN Meds:  acetaminophen     Tablet .. 650 milliGRAM(s) Oral every 6 hours PRN  dextrose Oral Gel 15 Gram(s) Oral once PRN      LABS:                        11.3   9.32  )-----------( 349      ( 21 Mar 2024 06:50 )             35.0     Hgb Trend: 11.3<--, 12.3<--, 12.0<--, 12.0<--  03-21    139  |  105  |  15  ----------------------------<  233<H>  4.7   |  22  |  0.62    Ca    10.1      21 Mar 2024 06:48    TPro  7.2  /  Alb  x   /  TBili  x   /  DBili  x   /  AST  x   /  ALT  x   /  AlkPhos  x   03-20    Creatinine Trend: 0.62<--, 0.74<--, 1.17<--, 0.63<--    Urinalysis Basic - ( 21 Mar 2024 06:48 )    Color: x / Appearance: x / SG: x / pH: x  Gluc: 233 mg/dL / Ketone: x  / Bili: x / Urobili: x   Blood: x / Protein: x / Nitrite: x   Leuk Esterase: x / RBC: x / WBC x   Sq Epi: x / Non Sq Epi: x / Bacteria: x            MICROBIOLOGY:       RADIOLOGY:  [ ] Reviewed and interpreted by me        EKG:

## 2024-03-21 NOTE — PROGRESS NOTE ADULT - SUBJECTIVE AND OBJECTIVE BOX
SUBJECTIVE / OVERNIGHT EVENTS:    patient seen and examined  resting comfortably OOB in chair  itching improving however still requesting more tubes of cream    --------------------------------------------------------------------------------------------  LABS:                        11.3   9.32  )-----------( 349      ( 21 Mar 2024 06:50 )             35.0     03-21    139  |  105  |  15  ----------------------------<  233<H>  4.7   |  22  |  0.62    Ca    10.1      21 Mar 2024 06:48    TPro  7.2  /  Alb  x   /  TBili  x   /  DBili  x   /  AST  x   /  ALT  x   /  AlkPhos  x   03-20      CAPILLARY BLOOD GLUCOSE      POCT Blood Glucose.: 248 mg/dL (21 Mar 2024 07:25)  POCT Blood Glucose.: 252 mg/dL (20 Mar 2024 23:11)  POCT Blood Glucose.: 354 mg/dL (20 Mar 2024 21:21)  POCT Blood Glucose.: 284 mg/dL (20 Mar 2024 17:05)  POCT Blood Glucose.: 158 mg/dL (20 Mar 2024 11:48)    CARDIAC MARKERS ( 20 Mar 2024 06:58 )  x     / x     / 45 U/L / x     / x          Urinalysis Basic - ( 21 Mar 2024 06:48 )    Color: x / Appearance: x / SG: x / pH: x  Gluc: 233 mg/dL / Ketone: x  / Bili: x / Urobili: x   Blood: x / Protein: x / Nitrite: x   Leuk Esterase: x / RBC: x / WBC x   Sq Epi: x / Non Sq Epi: x / Bacteria: x        RADIOLOGY & ADDITIONAL TESTS:    Imaging Personally Reviewed:  [x] YES  [ ] NO    Consultant(s) Notes Reviewed:  [x] YES  [ ] NO    MEDICATIONS  (STANDING):  atorvastatin 40 milliGRAM(s) Oral at bedtime  chlorhexidine 2% Cloths 1 Application(s) Topical daily  clobetasol 0.05% Ointment 1 Application(s) Topical two times a day  dextrose 5%. 1000 milliLiter(s) (50 mL/Hr) IV Continuous <Continuous>  dextrose 5%. 1000 milliLiter(s) (100 mL/Hr) IV Continuous <Continuous>  dextrose 50% Injectable 25 Gram(s) IV Push once  dextrose 50% Injectable 12.5 Gram(s) IV Push once  dextrose 50% Injectable 25 Gram(s) IV Push once  gabapentin 300 milliGRAM(s) Oral three times a day  glucagon  Injectable 1 milliGRAM(s) IntraMuscular once  influenza   Vaccine 0.5 milliLiter(s) IntraMuscular once  insulin lispro (ADMELOG) corrective regimen sliding scale   SubCutaneous three times a day before meals  lisinopril 20 milliGRAM(s) Oral daily    MEDICATIONS  (PRN):  acetaminophen     Tablet .. 650 milliGRAM(s) Oral every 6 hours PRN Mild Pain (1 - 3)  dextrose Oral Gel 15 Gram(s) Oral once PRN Blood Glucose LESS THAN 70 milliGRAM(s)/deciliter      Care Discussed with Consultants/Other Providers [x] YES  [ ] NO    Vital Signs Last 24 Hrs  T(C): 36.4 (21 Mar 2024 04:37), Max: 36.8 (20 Mar 2024 20:20)  T(F): 97.6 (21 Mar 2024 04:37), Max: 98.2 (20 Mar 2024 20:20)  HR: 86 (21 Mar 2024 04:37) (86 - 118)  BP: 117/69 (21 Mar 2024 04:37) (112/77 - 161/80)  BP(mean): --  RR: 18 (21 Mar 2024 04:37) (18 - 18)  SpO2: 98% (21 Mar 2024 04:37) (97% - 98%)    Parameters below as of 21 Mar 2024 04:37  Patient On (Oxygen Delivery Method): room air      I&O's Summary    20 Mar 2024 07:01  -  21 Mar 2024 07:00  --------------------------------------------------------  IN: 240 mL / OUT: 500 mL / NET: -260 mL     PHYSICAL EXAM:  GENERAL: NAD, well-developed, comfortable  HEAD:  Atraumatic, Normocephalic  EYES: EOMI, PERRLA, conjunctiva and sclera clear  NECK: Supple, No JVD  CHEST/LUNG: Clear to auscultation bilaterally; No wheeze  HEART: Regular rate and rhythm; No murmurs, rubs, or gallops  ABDOMEN: Soft, Nontender, Nondistended; Bowel sounds present  NEURO: AAOx3, no focal weakness, 5/5 b/l extremity strength, b/l knee no arthritis, no effusion   EXTREMITIES:  2+ Peripheral Pulses, No clubbing, cyanosis, or edema  SKIN: + raised rash throughout entire upper body

## 2024-03-21 NOTE — PROGRESS NOTE ADULT - ATTENDING COMMENTS
Pt with recurrent rash and elevated ESR (120). Prev seen by our service with somewhat different rash- bx at that time showed dermal hypersensitivity and rash was accompanied by systemic sxs. Thought to be infectious although source never identified. 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 Today's rash may represent an id reaction to contact derm. Would also consider other etiologies such as PMLE (although denies sunlight exposure). We have biopsied again to rule out other etiologies (i.e. AI CTD, atypical BP) given ESR, but this may be unrelated. Agree with abdominal ultrasound given elevated ALP. Would check stool O&P and BP abs as well.
I personally interviewed and examined the patient. Agree with rheumatology resident's note with my edits as above. Marked improvement with topical steroids. Work-up in progress. Explained that she will need to fu as outpatient after discharge to follow on all tests that were requested.

## 2024-03-21 NOTE — PROGRESS NOTE ADULT - SUBJECTIVE AND OBJECTIVE BOX
INTERVAL HPI/OVERNIGHT EVENTS:    denies n/v/d/c, abdominal pain, melena or brbpr     MEDICATIONS  (STANDING):  atorvastatin 40 milliGRAM(s) Oral at bedtime  chlorhexidine 2% Cloths 1 Application(s) Topical daily  clobetasol 0.05% Ointment 1 Application(s) Topical two times a day  dextrose 5%. 1000 milliLiter(s) (50 mL/Hr) IV Continuous <Continuous>  dextrose 5%. 1000 milliLiter(s) (100 mL/Hr) IV Continuous <Continuous>  dextrose 50% Injectable 25 Gram(s) IV Push once  dextrose 50% Injectable 12.5 Gram(s) IV Push once  dextrose 50% Injectable 25 Gram(s) IV Push once  gabapentin 300 milliGRAM(s) Oral three times a day  glucagon  Injectable 1 milliGRAM(s) IntraMuscular once  influenza   Vaccine 0.5 milliLiter(s) IntraMuscular once  insulin lispro (ADMELOG) corrective regimen sliding scale   SubCutaneous three times a day before meals  lisinopril 20 milliGRAM(s) Oral daily    MEDICATIONS  (PRN):  acetaminophen     Tablet .. 650 milliGRAM(s) Oral every 6 hours PRN Mild Pain (1 - 3)  dextrose Oral Gel 15 Gram(s) Oral once PRN Blood Glucose LESS THAN 70 milliGRAM(s)/deciliter      Allergies    penicillins (Swelling)    Intolerances        Review of Systems:    General:  No wt loss, fevers, chills, night sweats, fatigue   Eyes:  Good vision, no reported pain  ENT:  No sore throat, pain, runny nose, dysphagia  CV:  No pain, palpitations, hypo/hypertension  Resp:  No dyspnea, cough, tachypnea, wheezing  GI:  No pain, No nausea, No vomiting, No diarrhea, No constipation, No weight loss, No fever, No pruritis, No rectal bleeding, No melena, No dysphagia  :  No pain, bleeding, incontinence, nocturia  Muscle:  No pain, weakness  Neuro:  No weakness, tingling, memory problems  Psych:  No fatigue, insomnia, mood problems, depression  Endocrine:  No polyuria, polydypsia, cold/heat intolerance  Heme:  No petechiae, ecchymosis, easy bruisability  Skin:  No rash, tattoos, scars, edema      Vital Signs Last 24 Hrs  T(C): 36.5 (21 Mar 2024 11:05), Max: 36.8 (20 Mar 2024 20:20)  T(F): 97.7 (21 Mar 2024 11:05), Max: 98.2 (20 Mar 2024 20:20)  HR: 98 (21 Mar 2024 11:05) (86 - 118)  BP: 106/63 (21 Mar 2024 11:05) (106/63 - 161/80)  BP(mean): --  RR: 18 (21 Mar 2024 11:05) (18 - 18)  SpO2: 97% (21 Mar 2024 11:05) (97% - 98%)    Parameters below as of 21 Mar 2024 11:05  Patient On (Oxygen Delivery Method): room air        PHYSICAL EXAM:    Constitutional: NAD  HEENT: EOMI, throat clear  Neck: No LAD, supple  Respiratory: CTA and P  Cardiovascular: S1 and S2, RRR, no M  Gastrointestinal: BS+, soft, NT/ND, neg HSM,  Extremities: No peripheral edema, neg clubbing, cyanosis  Vascular: 2+ peripheral pulses  Neurological: A/O   Psychiatric: Normal mood, normal affect  Skin: No rashes      LABS:                        11.3   9.32  )-----------( 349      ( 21 Mar 2024 06:50 )             35.0     03-21    139  |  105  |  15  ----------------------------<  233<H>  4.7   |  22  |  0.62    Ca    10.1      21 Mar 2024 06:48    TPro  7.2  /  Alb  x   /  TBili  x   /  DBili  x   /  AST  x   /  ALT  x   /  AlkPhos  x   03-20      Urinalysis Basic - ( 21 Mar 2024 06:48 )    Color: x / Appearance: x / SG: x / pH: x  Gluc: 233 mg/dL / Ketone: x  / Bili: x / Urobili: x   Blood: x / Protein: x / Nitrite: x   Leuk Esterase: x / RBC: x / WBC x   Sq Epi: x / Non Sq Epi: x / Bacteria: x        RADIOLOGY & ADDITIONAL TESTS:

## 2024-03-21 NOTE — PROGRESS NOTE ADULT - ASSESSMENT
Ms. Reynolds is a 63y Female with PMHx of lower back pain, Type II DM, HLD, GERD, hx of SBO due to adhesions, thyroid nodule, palpitations, ZIYAD/BSO who presented with acute worsening of diffuse upper body skin rash. She stated she first noted this rash 01/2024 begining on on her hands and eventually progressing up her arms, torso, face and posterior nape. She has seen dermatology in the past and biopsy showed signs of contact dermatitis. She stated she had improvement in the rash, particularly the facial component while she was on a 15 day course of Doxycycline BID in Feb 2024, and upon stopping, the rash has returned and is progressively worsening associated with pain, prutitis.     Per chart and dermatology pt was seen by Dermatology during [facility] admission in late October 2023, noted to have diffuse rash in the setting of fevers, hypotension, elevated inflammatory markers. Biopsy on 10/30 was consistent with dermal hypersensitivity reaction, possibly 2/2 viral process. Extensive infectious workup at that time was negative.    She was being evaluated by GI recently who found elevated ALP to 217 with isoenzymes showing 17% for macrohepatic isoenzymes and therefore was concerning for cholestatic liver disease associated rash. She was recommended for US Abd but was not able to complete it and is now admitted here to [facility]. GI requesting continuation of workup with possible MRI/MCRP. Dermatology consulted and repeated biopsy inpatient.     # Diffuse Upper body rash  -    - IgG outpatient elevated to 1662   - Dermatology repeated biopsy  - rheumatology evaluation, noted possible DM pending biopsy results   - GI evaluation per outpatient evaluation, recs noted, pending US Abd   - Recommend symptomatic treatment for now given pruritis   - f/u SPEP, JARRETT, Serum free light chains, serum immunoglobin levels, LDH, Beta-microglobulin (2.6)   - f/u biopsy results, ask to perform flow cytometry on sample if possible     # Elevated LFTs  - , progressively worsening compared to outpatient AST approx 60s last week   - Outpatient Alk phos was 191  - Coags wnl, Bilrubin normal, Hep C neg, antimitochondrial ab outpatient negative   -- Per GI outpatient Macrohepatic isoenzyme was 17%  - Outpt GI evaluated and recommending US Abd and MRI/MRCP   - GI evaluation as above, recs noted and appreciated   - f/u US Abd   - Continue to trend     # Elevated ESR  # Hx of iron deficiency   - Outpatient Ferritin 32,  sat 15%  - CARTER neg outpatient   - Rheumatology consul, recs noted     # Type II DM   - HbA1c 8.1, not well controlled       Thank you for allowing me to participate in the care of Ms. Reynolds please do not hesitate to call or text me if you have further questions or concerns.     Jessee Vela MD  [facility]   Division of Hematology/Oncology  [facility] [facility], [address]  [place] [address]  P: [phone]  F: [phone]    Attestation:    ---- Pt evaluated including face-to-face interaction in addition to chart review, reviewing treatment plan, and managing the patient’s chronic diagnoses as listed in the assessment---- Ms. Reynolds is a 63y Female with PMHx of lower back pain, Type II DM, HLD, GERD, hx of SBO due to adhesions, thyroid nodule, palpitations, ZIYAD/BSO who presented with acute worsening of diffuse upper body skin rash. She stated she first noted this rash 01/2024 begining on on her hands and eventually progressing up her arms, torso, face and posterior nape. She has seen dermatology in the past and biopsy showed signs of contact dermatitis. She stated she had improvement in the rash, particularly the facial component while she was on a 15 day course of Doxycycline BID in Feb 2024, and upon stopping, the rash has returned and is progressively worsening associated with pain, prutitis.     Per chart and dermatology pt was seen by Dermatology during [facility] admission in late October 2023, noted to have diffuse rash in the setting of fevers, hypotension, elevated inflammatory markers. Biopsy on 10/30 was consistent with dermal hypersensitivity reaction, possibly 2/2 viral process. Extensive infectious workup at that time was negative.    She was being evaluated by GI recently who found elevated ALP to 217 with isoenzymes showing 17% for macrohepatic isoenzymes and therefore was concerning for cholestatic liver disease associated rash. She was recommended for US Abd but was not able to complete it and is now admitted here to [facility]. GI requesting continuation of workup with MRI/MCRP, US Abd with normal liver or spleen. Dermatology consulted and repeated biopsy inpatient.     # Diffuse Upper body rash  -    - IgG outpatient elevated to 1662 >1668 mild elevation   - Dermatology repeated biopsy  - rheumatology evaluation, noted possible DM pending biopsy results   - GI evaluation per outpatient evaluation, recs noted, pending US Abd   - Recommend symptomatic treatment for now given pruritis   - f/u SPEP, JARRETT, Serum free light chains (R 1.36), serum immunoglobin levels (IgG 1668), LDH (209), Beta-microglobulin (2.6)   -  noted Urine FLC ratio >9, serum FLCs wnl, pending complete paraproteinemia workup   - f/u biopsy results, ask to perform flow cytometry on sample if possible     # Elevated LFTs  - , progressively worsening compared to outpatient AST approx 60s last week   - Outpatient Alk phos was 191  - Coags wnl, Bilirubin normal, Hep C neg, antimitochondrial ab outpatient negative   -- Per GI outpatient Macrohepatic isoenzyme was 17%  - Outpt GI evaluated and recommending US Abd and MRI/MRCP   - GI evaluation as above, recs noted and appreciated   - US Abd with normal liver and spleen, pending MRCP per GI  - Continue to trend     # Elevated ESR  # Hx of iron deficiency   - Outpatient Ferritin 32,  sat 15%  - CARTER neg outpatient   - Rheumatology consul, recs noted     # Type II DM   - HbA1c 8.1, not well controlled       Thank you for allowing me to participate in the care of Ms. Reynolds please do not hesitate to call or text me if you have further questions or concerns.     Jessee Vela MD  [facility]   Division of Hematology/Oncology  [facility] [facility], [address]  [place] [address]  P: [phone]  F: [phone]    Attestation:    ---- Pt evaluated including face-to-face interaction in addition to chart review, reviewing treatment plan, and managing the patient’s chronic diagnoses as listed in the assessment----

## 2024-03-22 ENCOUNTER — TRANSCRIPTION ENCOUNTER (OUTPATIENT)
Age: 64
End: 2024-03-22

## 2024-03-22 VITALS
OXYGEN SATURATION: 97 % | TEMPERATURE: 98 F | DIASTOLIC BLOOD PRESSURE: 75 MMHG | HEART RATE: 92 BPM | RESPIRATION RATE: 18 BRPM | SYSTOLIC BLOOD PRESSURE: 133 MMHG

## 2024-03-22 LAB
24R-OH-CALCIDIOL SERPL-MCNC: 38.7 NG/ML — SIGNIFICANT CHANGE UP (ref 30–80)
ANION GAP SERPL CALC-SCNC: 13 MMOL/L — SIGNIFICANT CHANGE UP (ref 5–17)
BUN SERPL-MCNC: 16 MG/DL — SIGNIFICANT CHANGE UP (ref 7–23)
CALCIUM SERPL-MCNC: 10.1 MG/DL — SIGNIFICANT CHANGE UP (ref 8.4–10.5)
CHLORIDE SERPL-SCNC: 103 MMOL/L — SIGNIFICANT CHANGE UP (ref 96–108)
CO2 SERPL-SCNC: 22 MMOL/L — SIGNIFICANT CHANGE UP (ref 22–31)
CREAT SERPL-MCNC: 0.61 MG/DL — SIGNIFICANT CHANGE UP (ref 0.5–1.3)
EGFR: 101 ML/MIN/1.73M2 — SIGNIFICANT CHANGE UP
GLUCOSE BLDC GLUCOMTR-MCNC: 288 MG/DL — HIGH (ref 70–99)
GLUCOSE BLDC GLUCOMTR-MCNC: 314 MG/DL — HIGH (ref 70–99)
GLUCOSE BLDC GLUCOMTR-MCNC: 446 MG/DL — HIGH (ref 70–99)
GLUCOSE BLDC GLUCOMTR-MCNC: 480 MG/DL — CRITICAL HIGH (ref 70–99)
GLUCOSE SERPL-MCNC: 286 MG/DL — HIGH (ref 70–99)
HCT VFR BLD CALC: 36.5 % — SIGNIFICANT CHANGE UP (ref 34.5–45)
HGB BLD-MCNC: 11.3 G/DL — LOW (ref 11.5–15.5)
MCHC RBC-ENTMCNC: 24.8 PG — LOW (ref 27–34)
MCHC RBC-ENTMCNC: 31 GM/DL — LOW (ref 32–36)
MCV RBC AUTO: 80.2 FL — SIGNIFICANT CHANGE UP (ref 80–100)
NRBC # BLD: 0 /100 WBCS — SIGNIFICANT CHANGE UP (ref 0–0)
PLATELET # BLD AUTO: 344 K/UL — SIGNIFICANT CHANGE UP (ref 150–400)
POTASSIUM SERPL-MCNC: 4.4 MMOL/L — SIGNIFICANT CHANGE UP (ref 3.5–5.3)
POTASSIUM SERPL-SCNC: 4.4 MMOL/L — SIGNIFICANT CHANGE UP (ref 3.5–5.3)
RBC # BLD: 4.55 M/UL — SIGNIFICANT CHANGE UP (ref 3.8–5.2)
RBC # FLD: 13.3 % — SIGNIFICANT CHANGE UP (ref 10.3–14.5)
RHEUMATOID FACT SERPL-ACNC: <10 IU/ML — SIGNIFICANT CHANGE UP (ref 0–13)
SODIUM SERPL-SCNC: 138 MMOL/L — SIGNIFICANT CHANGE UP (ref 135–145)
WBC # BLD: 9.16 K/UL — SIGNIFICANT CHANGE UP (ref 3.8–10.5)
WBC # FLD AUTO: 9.16 K/UL — SIGNIFICANT CHANGE UP (ref 3.8–10.5)

## 2024-03-22 PROCEDURE — 83615 LACTATE (LD) (LDH) ENZYME: CPT

## 2024-03-22 PROCEDURE — 96374 THER/PROPH/DIAG INJ IV PUSH: CPT

## 2024-03-22 PROCEDURE — 83521 IG LIGHT CHAINS FREE EACH: CPT

## 2024-03-22 PROCEDURE — 85027 COMPLETE CBC AUTOMATED: CPT

## 2024-03-22 PROCEDURE — 87641 MR-STAPH DNA AMP PROBE: CPT

## 2024-03-22 PROCEDURE — 84484 ASSAY OF TROPONIN QUANT: CPT

## 2024-03-22 PROCEDURE — A9585: CPT

## 2024-03-22 PROCEDURE — 93005 ELECTROCARDIOGRAM TRACING: CPT

## 2024-03-22 PROCEDURE — 84165 PROTEIN E-PHORESIS SERUM: CPT

## 2024-03-22 PROCEDURE — 88312 SPECIAL STAINS GROUP 1: CPT

## 2024-03-22 PROCEDURE — 82652 VIT D 1 25-DIHYDROXY: CPT

## 2024-03-22 PROCEDURE — 71260 CT THORAX DX C+: CPT | Mod: 26

## 2024-03-22 PROCEDURE — 85610 PROTHROMBIN TIME: CPT

## 2024-03-22 PROCEDURE — 82164 ANGIOTENSIN I ENZYME TEST: CPT

## 2024-03-22 PROCEDURE — 36415 COLL VENOUS BLD VENIPUNCTURE: CPT

## 2024-03-22 PROCEDURE — 84479 ASSAY OF THYROID (T3 OR T4): CPT

## 2024-03-22 PROCEDURE — 83516 IMMUNOASSAY NONANTIBODY: CPT

## 2024-03-22 PROCEDURE — 84436 ASSAY OF TOTAL THYROXINE: CPT

## 2024-03-22 PROCEDURE — 76700 US EXAM ABDOM COMPLETE: CPT

## 2024-03-22 PROCEDURE — 86480 TB TEST CELL IMMUN MEASURE: CPT

## 2024-03-22 PROCEDURE — 87389 HIV-1 AG W/HIV-1&-2 AB AG IA: CPT

## 2024-03-22 PROCEDURE — 86780 TREPONEMA PALLIDUM: CPT

## 2024-03-22 PROCEDURE — 86140 C-REACTIVE PROTEIN: CPT

## 2024-03-22 PROCEDURE — 82784 ASSAY IGA/IGD/IGG/IGM EACH: CPT

## 2024-03-22 PROCEDURE — 84443 ASSAY THYROID STIM HORMONE: CPT

## 2024-03-22 PROCEDURE — 83036 HEMOGLOBIN GLYCOSYLATED A1C: CPT

## 2024-03-22 PROCEDURE — 74177 CT ABD & PELVIS W/CONTRAST: CPT | Mod: MC

## 2024-03-22 PROCEDURE — 87177 OVA AND PARASITES SMEARS: CPT

## 2024-03-22 PROCEDURE — 74183 MRI ABD W/O CNTR FLWD CNTR: CPT | Mod: MC

## 2024-03-22 PROCEDURE — 82248 BILIRUBIN DIRECT: CPT

## 2024-03-22 PROCEDURE — 99285 EMERGENCY DEPT VISIT HI MDM: CPT

## 2024-03-22 PROCEDURE — 93975 VASCULAR STUDY: CPT

## 2024-03-22 PROCEDURE — 86334 IMMUNOFIX E-PHORESIS SERUM: CPT

## 2024-03-22 PROCEDURE — 85025 COMPLETE CBC W/AUTO DIFF WBC: CPT

## 2024-03-22 PROCEDURE — 84155 ASSAY OF PROTEIN SERUM: CPT

## 2024-03-22 PROCEDURE — 86038 ANTINUCLEAR ANTIBODIES: CPT

## 2024-03-22 PROCEDURE — 86235 NUCLEAR ANTIGEN ANTIBODY: CPT

## 2024-03-22 PROCEDURE — 86431 RHEUMATOID FACTOR QUANT: CPT

## 2024-03-22 PROCEDURE — 85652 RBC SED RATE AUTOMATED: CPT

## 2024-03-22 PROCEDURE — 71046 X-RAY EXAM CHEST 2 VIEWS: CPT

## 2024-03-22 PROCEDURE — 80053 COMPREHEN METABOLIC PANEL: CPT

## 2024-03-22 PROCEDURE — 82962 GLUCOSE BLOOD TEST: CPT

## 2024-03-22 PROCEDURE — 96361 HYDRATE IV INFUSION ADD-ON: CPT

## 2024-03-22 PROCEDURE — 80074 ACUTE HEPATITIS PANEL: CPT

## 2024-03-22 PROCEDURE — 82232 ASSAY OF BETA-2 PROTEIN: CPT

## 2024-03-22 PROCEDURE — 71260 CT THORAX DX C+: CPT | Mod: MC

## 2024-03-22 PROCEDURE — 74177 CT ABD & PELVIS W/CONTRAST: CPT | Mod: 26

## 2024-03-22 PROCEDURE — 88342 IMHCHEM/IMCYTCHM 1ST ANTB: CPT

## 2024-03-22 PROCEDURE — 85730 THROMBOPLASTIN TIME PARTIAL: CPT

## 2024-03-22 PROCEDURE — 80048 BASIC METABOLIC PNL TOTAL CA: CPT

## 2024-03-22 PROCEDURE — 82550 ASSAY OF CK (CPK): CPT

## 2024-03-22 PROCEDURE — 87640 STAPH A DNA AMP PROBE: CPT

## 2024-03-22 PROCEDURE — 84166 PROTEIN E-PHORESIS/URINE/CSF: CPT

## 2024-03-22 PROCEDURE — 82306 VITAMIN D 25 HYDROXY: CPT

## 2024-03-22 PROCEDURE — 88305 TISSUE EXAM BY PATHOLOGIST: CPT

## 2024-03-22 RX ORDER — HYDROCORTISONE 1 %
1 OINTMENT (GRAM) TOPICAL
Qty: 1 | Refills: 0
Start: 2024-03-22 | End: 2024-04-20

## 2024-03-22 RX ADMIN — GABAPENTIN 300 MILLIGRAM(S): 400 CAPSULE ORAL at 05:27

## 2024-03-22 RX ADMIN — Medication 4: at 17:21

## 2024-03-22 RX ADMIN — Medication 3: at 07:53

## 2024-03-22 RX ADMIN — Medication 1 APPLICATION(S): at 17:23

## 2024-03-22 RX ADMIN — CHLORHEXIDINE GLUCONATE 1 APPLICATION(S): 213 SOLUTION TOPICAL at 13:11

## 2024-03-22 RX ADMIN — Medication 1 APPLICATION(S): at 05:27

## 2024-03-22 RX ADMIN — GABAPENTIN 300 MILLIGRAM(S): 400 CAPSULE ORAL at 13:11

## 2024-03-22 RX ADMIN — LISINOPRIL 20 MILLIGRAM(S): 2.5 TABLET ORAL at 05:26

## 2024-03-22 RX ADMIN — Medication 6: at 11:25

## 2024-03-22 NOTE — DISCHARGE NOTE PROVIDER - NSDCHOSPICE_GEN_A_CORE
----- Message from KEYANNA Martin sent at 6/14/2023  1:08 PM EDT -----  Please notify patient of normal stress test.    
Left detailed vm.    
No

## 2024-03-22 NOTE — DISCHARGE NOTE PROVIDER - NSDCMRMEDTOKEN_GEN_ALL_CORE_FT
gabapentin 300 mg oral capsule: 1 cap(s) orally 3 times a day  glyburide-metformin 2.5 mg-500 mg oral tablet: 2 tab(s) orally 2 times a day  ramipril 5 mg oral tablet: 1 tab(s) orally 2 times a day  semaglutide 0.25 mg/0.5 mL (0.25 mg dose) subcutaneous solution: 0.25 milligram(s) subcutaneously once a week  simvastatin 40 mg oral tablet: 1 tab(s) orally once a day   clobetasol 0.05% topical ointment: Apply topically to affected area 2 times a day  gabapentin 300 mg oral capsule: 1 cap(s) orally 3 times a day  glyburide-metformin 2.5 mg-500 mg oral tablet: 2 tab(s) orally 2 times a day  hydrocortisone 2.5% topical ointment: Apply topically to affected area 2 times a day as needed for  itching  ramipril 5 mg oral tablet: 1 tab(s) orally 2 times a day  semaglutide 0.25 mg/0.5 mL (0.25 mg dose) subcutaneous solution: 0.25 milligram(s) subcutaneously once a week  simvastatin 40 mg oral tablet: 1 tab(s) orally once a day

## 2024-03-22 NOTE — DISCHARGE NOTE PROVIDER - NSDCFUADDAPPT_GEN_ALL_CORE_FT
Patient can follow up in outpatient Dermatology clinic within 1 week of discharge with Dr. Hickman at [address] [place] [address], [place] [address]. The office number is [phone] if patient does not hear from us to coordinate appointment over the next few days    APPTS ARE READY TO BE MADE: [x ] YES    Best Family or Patient Contact (if needed):    Additional Information about above appointments (if needed):    1:   2:   3:     Other comments or requests:    Patient can follow up in outpatient Dermatology clinic within 1 week of discharge with Dr. Hickman at [address] [facility] [address] [facility], [place] [address]. The office number is [phone] if patient does not hear from us to coordinate appointment over the next few days    APPTS ARE READY TO BE MADE: [x ] YES    Best Family or Patient Contact (if needed):    Additional Information about above appointments (if needed):    1:   2:   3:     Other comments or requests:     Prior to outreaching the patient, it was visible that the patient has secured a follow up appointment which was not scheduled by our team. Patient is scheduled with Dr. Mello 4/2/24 3:30pm [address] [place] [address].    Prior to outreaching the patient, it was visible that the patient has secured a follow up appointment which was not scheduled by our team. Patient is scheduled with Vick 3/28/24 11:00am [address] [place] [address]

## 2024-03-22 NOTE — DISCHARGE NOTE NURSING/CASE MANAGEMENT/SOCIAL WORK - NSDCFUADDAPPT_GEN_ALL_CORE_FT
Patient can follow up in outpatient Dermatology clinic within 1 week of discharge with Dr. Hickman at [address] [place] [address], [place] [address]. The office number is [phone] if patient does not hear from us to coordinate appointment over the next few days    APPTS ARE READY TO BE MADE: [x ] YES    Best Family or Patient Contact (if needed):    Additional Information about above appointments (if needed):    1:   2:   3:     Other comments or requests:

## 2024-03-22 NOTE — PROGRESS NOTE ADULT - ASSESSMENT
This is a 63 year old female PMH: HLD and DM2. presents to [facility] for a progressing painful rash x 2 months.    Plan:    # Rash:  - Sed rate 120, CRP 17  - CXR negative  - Per Derm-> start clobetasol 0.05% ointment BID to affected areas on BODY (Avoid face, groin, skin folds)  - Preliminary biopsy review consistent with granulomatous process  - Per Derm please send: rheumatoid factor, syphilis screen (T Pallidum), quant gold, stool ova and parasites, further CTD workup per Rheumatology  - Derm/Rheum following    # Sinus Tachy:  - Monitor on tele    # Elevated LFTs/ Hep B/A  - Outpatient hepatology workup reviewed; ALP ~ 290, elevated . ALP is 180  - CARTER, SMA, AMA, A1AT, iron panel, TSH, Celiac panel, CK, hepatitis A/B/C serologies - all negative   - Abdominal US w/ duplex with normal liver and spleen, MRCP completed, results pending   - GI following    # HLD:  - C/w current meds    # DM2:  - HgbA1c 8.1%  - Continue to monitor blood glucose levels  - Sliding Scale    # GI ppx:  - Bowel regimen prn    # DVT ppx:  - IPC's    [facility]

## 2024-03-22 NOTE — PROGRESS NOTE ADULT - PROVIDER SPECIALTY LIST ADULT
Internal Medicine
Gastroenterology
Gastroenterology
Heme/Onc
Internal Medicine
Internal Medicine
Rheumatology
Gastroenterology
Internal Medicine
Dermatology
Dermatology
Heme/Onc
Heme/Onc

## 2024-03-22 NOTE — PROGRESS NOTE ADULT - ASSESSMENT
Ms. Reynolds is a 63y Female with PMHx of lower back pain, Type II DM, HLD, GERD, hx of SBO due to adhesions, thyroid nodule, palpitations, ZIYAD/BSO who presented with acute worsening of diffuse upper body skin rash. She stated she first noted this rash 01/2024 begining on on her hands and eventually progressing up her arms, torso, face and posterior nape. She has seen dermatology in the past and biopsy showed signs of contact dermatitis. She stated she had improvement in the rash, particularly the facial component while she was on a 15 day course of Doxycycline BID in Feb 2024, and upon stopping, the rash has returned and is progressively worsening associated with pain, prutitis.     Per chart and dermatology pt was seen by Dermatology during [facility] admission in late October 2023, noted to have diffuse rash in the setting of fevers, hypotension, elevated inflammatory markers. Biopsy on 10/30 was consistent with dermal hypersensitivity reaction, possibly 2/2 viral process. Extensive infectious workup at that time was negative.    She was being evaluated by GI recently who found elevated ALP to 217 with isoenzymes showing 17% for macrohepatic isoenzymes and therefore was concerning for cholestatic liver disease associated rash. She was recommended for US Abd but was not able to complete it and is now admitted here to [facility]. GI requesting continuation of workup with MRI/MCRP, US Abd with normal liver or spleen. Dermatology consulted and repeated biopsy inpatient.     # Diffuse Upper body rash  -    - IgG outpatient elevated to 1662 >1668 mild elevation   - Dermatology repeated biopsy  - rheumatology evaluation, noted possible DM pending biopsy results   - GI evaluation per outpatient evaluation, recs noted, pending US Abd   - Recommend symptomatic treatment for now given pruritis   - f/u SPEP, JARRETT, Serum free light chains (R 1.36), serum immunoglobin levels (IgG 1668), LDH (209), Beta-microglobulin (2.6)   -  noted Urine FLC ratio >9, serum FLCs wnl, pending complete paraproteinemia workup   - f/u biopsy results, ask to perform flow cytometry on sample if possible     # Elevated LFTs  - , progressively worsening compared to outpatient AST approx 60s last week   - Outpatient Alk phos was 191  - Coags wnl, Bilirubin normal, Hep C neg, antimitochondrial ab outpatient negative   -- Per GI outpatient Macrohepatic isoenzyme was 17%  - Outpt GI evaluated and recommending US Abd and MRI/MRCP   - GI evaluation as above, recs noted and appreciated   - US Abd with normal liver and spleen, pending MRCP per GI  - Continue to trend     # Elevated ESR  # Hx of iron deficiency   - Outpatient Ferritin 32,  sat 15%  - CARTER neg outpatient   - Rheumatology consul, recs noted     # Type II DM   - HbA1c 8.1, not well controlled       Thank you for allowing me to participate in the care of Ms. Reynolds please do not hesitate to call or text me if you have further questions or concerns.     Jessee Vela MD  [facility]   Division of Hematology/Oncology  [facility], [address]  [place] [address]  P: [phone]  F: [phone]    Attestation:    ---- Pt evaluated including face-to-face interaction in addition to chart review, reviewing treatment plan, and managing the patient’s chronic diagnoses as listed in the assessment---- Ms. Reynolds is a 63y Female with PMHx of lower back pain, Type II DM, HLD, GERD, hx of SBO due to adhesions, thyroid nodule, palpitations, ZIYAD/BSO who presented with acute worsening of diffuse upper body skin rash. She stated she first noted this rash 01/2024 begining on on her hands and eventually progressing up her arms, torso, face and posterior nape. She has seen dermatology in the past and biopsy showed signs of contact dermatitis. She stated she had improvement in the rash, particularly the facial component while she was on a 15 day course of Doxycycline BID in Feb 2024, and upon stopping, the rash has returned and is progressively worsening associated with pain, prutitis.     Per chart and dermatology pt was seen by Dermatology during [facility] admission in late October 2023, noted to have diffuse rash in the setting of fevers, hypotension, elevated inflammatory markers. Biopsy on 10/30 was consistent with dermal hypersensitivity reaction, possibly 2/2 viral process. Extensive infectious workup at that time was negative.    She was being evaluated by GI recently who found elevated ALP to 217 with isoenzymes showing 17% for macrohepatic isoenzymes and therefore was concerning for cholestatic liver disease associated rash. She was recommended for US Abd but was not able to complete it and is now admitted here to [facility]. GI requesting continuation of workup with MRI/MCRP, US Abd with normal liver or spleen. Noted prelim skin biopsy with granulomatous finding, noted Dermatology and Rheumatology recs, not consistent with DM, pending workup for CTD, so far pt responding to topical therapy       # Diffuse Upper body rash  -    - IgG outpatient elevated to 1662 >1668 mild elevation   - Dermatology repeated biopsy  - rheumatology evaluation, noted possible CTD vs less likely DM pending final biopsy results   - GI evaluation per outpatient evaluation, recs noted, pending US Abd   - Recommend symptomatic treatment for now given pruritis   - SPEP and JARRETT without monoclonal disease, Serum free light chains (R 1.36), serum immunoglobin levels (IgG 1668), LDH (209), Beta-microglobulin (2.6)   -  noted Urine FLC ratio >9, serum FLCs wnl, complete paraproteinemia workup as above so far negative   - f/u biopsy results    # Elevated LFTs  - , progressively worsening compared to outpatient AST approx 60s last week   - Outpatient Alk phos was 191  - Coags wnl, Bilirubin normal, Hep C neg, antimitochondrial ab outpatient negative   -- Per GI outpatient Macrohepatic isoenzyme was 17%  - Outpt GI evaluated and recommending US Abd and MRI/MRCP   - GI evaluation as above, recs noted and appreciated   - US Abd with normal liver and spleen, pending MRCP per GI  - Continue to trend     # Elevated ESR  # Hx of iron deficiency   - Outpatient Ferritin 32,  sat 15%  - CARTER neg outpatient   - Rheumatology consult, recs noted     # Type II DM   - HbA1c 8.1, not well controlled       Thank you for allowing me to participate in the care of Ms. Reynolds please do not hesitate to call or text me if you have further questions or concerns.     Jessee Vela MD  [facility]   Division of Hematology/Oncology  [address] [place] [address], [address]  [place] [address]  P: [phone]  F: [phone]    Attestation:    ---- Pt evaluated including face-to-face interaction in addition to chart review, reviewing treatment plan, and managing the patient’s chronic diagnoses as listed in the assessment----

## 2024-03-22 NOTE — PROGRESS NOTE ADULT - SUBJECTIVE AND OBJECTIVE BOX
[facility] HEMATOLOGY/ON[facility]OGY INPATIENT PROGRESS NOTE     Interval Hx:   03-22-24: Ms. Reynolds was seen at bedside today.    Meds:   MEDICATIONS  (STANDING):  atorvastatin 40 milliGRAM(s) Oral at bedtime  chlorhexidine 2% Cloths 1 Application(s) Topical daily  clobetasol 0.05% Ointment 1 Application(s) Topical two times a day  dextrose 5%. 1000 milliLiter(s) (50 mL/Hr) IV Continuous <Continuous>  dextrose 5%. 1000 milliLiter(s) (100 mL/Hr) IV Continuous <Continuous>  dextrose 50% Injectable 25 Gram(s) IV Push once  dextrose 50% Injectable 12.5 Gram(s) IV Push once  dextrose 50% Injectable 25 Gram(s) IV Push once  gabapentin 300 milliGRAM(s) Oral three times a day  glucagon  Injectable 1 milliGRAM(s) IntraMuscular once  influenza   Vaccine 0.5 milliLiter(s) IntraMuscular once  insulin lispro (ADMELOG) corrective regimen sliding scale   SubCutaneous three times a day before meals  lisinopril 20 milliGRAM(s) Oral daily  LORazepam     Tablet 1 milliGRAM(s) Oral once    MEDICATIONS  (PRN):  acetaminophen     Tablet .. 650 milliGRAM(s) Oral every 6 hours PRN Mild Pain (1 - 3)  dextrose Oral Gel 15 Gram(s) Oral once PRN Blood Glucose LESS THAN 70 milliGRAM(s)/deciliter    Vital Signs Last 24 Hrs  T(C): 36.4 (22 Mar 2024 04:32), Max: 36.7 (21 Mar 2024 20:20)  T(F): 97.5 (22 Mar 2024 04:32), Max: 98.1 (21 Mar 2024 20:20)  HR: 104 (22 Mar 2024 04:32) (93 - 104)  BP: 94/62 (22 Mar 2024 04:32) (94/62 - 128/79)  BP(mean): --  RR: 18 (22 Mar 2024 04:32) (18 - 18)  SpO2: 98% (22 Mar 2024 04:32) (97% - 98%)    Parameters below as of 22 Mar 2024 04:32  Patient On (Oxygen Delivery Method): room air    Physical Exam:  Gen: mild distress/upset due to rash   HEENT: EOMI, MMM, L eye is injected   Chest: Equal chest rise, speaking full sentences   Cardiac: RR  Abd: Nondistended   Ext: No edema   Neuro: AAOx3, normal/depressed mood and affect   Integument: diffuse maculopapular rash confluent over distal upper extremities with darkening of skin, edematous pink papules over proximal upper extremities extending to plaque like over top of back and nape of neck with dry skin     Labs:                        11.3   9.32  )-----------( 349      ( 21 Mar 2024 06:50 )             35.0     CBC Full  -  ( 21 Mar 2024 06:50 )  WBC Count : 9.32 K/uL  RBC Count : 4.37 M/uL  Hemoglobin : 11.3 g/dL  Hematocrit : 35.0 %  Platelet Count - Automated : 349 K/uL  Mean Cell Volume : 80.1 fl  Mean Cell Hemoglobin : 25.9 pg  Mean Cell Hemoglobin Concentration : 32.3 gm/dL    03-21    139  |  105  |  15  ----------------------------<  233<H>  4.7   |  22  |  0.62    Ca    10.1      21 Mar 2024 06:48    TPro  7.2  /  Alb  x   /  TBili  x   /  DBili  x   /  AST  x   /  ALT  x   /  AlkPhos  x   03-20       [facility] HEMATOLOGY/ON[facility]OGY INPATIENT PROGRESS NOTE     Interval Hx:   03-22-24: Ms. Reynolds was seen at bedside today, noted prelim skin biopsy with granulomatous finding, noted Dermatology and Rheumatology recs, not consistent with DM, pending workup for CTD, so far pt responding to topical therapy, serum paraproteinemia workup so far negative     Meds:   MEDICATIONS  (STANDING):  atorvastatin 40 milliGRAM(s) Oral at bedtime  chlorhexidine 2% Cloths 1 Application(s) Topical daily  clobetasol 0.05% Ointment 1 Application(s) Topical two times a day  dextrose 5%. 1000 milliLiter(s) (50 mL/Hr) IV Continuous <Continuous>  dextrose 5%. 1000 milliLiter(s) (100 mL/Hr) IV Continuous <Continuous>  dextrose 50% Injectable 25 Gram(s) IV Push once  dextrose 50% Injectable 12.5 Gram(s) IV Push once  dextrose 50% Injectable 25 Gram(s) IV Push once  gabapentin 300 milliGRAM(s) Oral three times a day  glucagon  Injectable 1 milliGRAM(s) IntraMuscular once  influenza   Vaccine 0.5 milliLiter(s) IntraMuscular once  insulin lispro (ADMELOG) corrective regimen sliding scale   SubCutaneous three times a day before meals  lisinopril 20 milliGRAM(s) Oral daily  LORazepam     Tablet 1 milliGRAM(s) Oral once    MEDICATIONS  (PRN):  acetaminophen     Tablet .. 650 milliGRAM(s) Oral every 6 hours PRN Mild Pain (1 - 3)  dextrose Oral Gel 15 Gram(s) Oral once PRN Blood Glucose LESS THAN 70 milliGRAM(s)/deciliter    Vital Signs Last 24 Hrs  T(C): 36.4 (22 Mar 2024 04:32), Max: 36.7 (21 Mar 2024 20:20)  T(F): 97.5 (22 Mar 2024 04:32), Max: 98.1 (21 Mar 2024 20:20)  HR: 104 (22 Mar 2024 04:32) (93 - 104)  BP: 94/62 (22 Mar 2024 04:32) (94/62 - 128/79)  BP(mean): --  RR: 18 (22 Mar 2024 04:32) (18 - 18)  SpO2: 98% (22 Mar 2024 04:32) (97% - 98%)    Parameters below as of 22 Mar 2024 04:32  Patient On (Oxygen Delivery Method): room air    Physical Exam:  Gen: NAD  HEENT: EOMI, MMM, L eye is injected   Chest: Equal chest rise, speaking full sentences   Cardiac: RR  Abd: Nondistended   Ext: No edema   Neuro: AAOx3, normal/depressed mood and affect   Integument: diffuse maculopapular rash confluent over distal upper extremities with darkening of skin, edematous pink papules over proximal upper extremities extending to plaque like over top of back and nape of neck with dry skin, improved     Labs:                        11.3   9.32  )-----------( 349      ( 21 Mar 2024 06:50 )             35.0     CBC Full  -  ( 21 Mar 2024 06:50 )  WBC Count : 9.32 K/uL  RBC Count : 4.37 M/uL  Hemoglobin : 11.3 g/dL  Hematocrit : 35.0 %  Platelet Count - Automated : 349 K/uL  Mean Cell Volume : 80.1 fl  Mean Cell Hemoglobin : 25.9 pg  Mean Cell Hemoglobin Concentration : 32.3 gm/dL    03-21    139  |  105  |  15  ----------------------------<  233<H>  4.7   |  22  |  0.62    Ca    10.1      21 Mar 2024 06:48    TPro  7.2  /  Alb  x   /  TBili  x   /  DBili  x   /  AST  x   /  ALT  x   /  AlkPhos  x   03-20

## 2024-03-22 NOTE — DISCHARGE NOTE PROVIDER - HOSPITAL COURSE
HPI:  This is a 63 year old female PMH: HLD and DM2. presents to [facility] for a progressing painful rash x 2 months. Rash began on her BL wrists and was seen and treated for contact dermatitis, however the rash has since worsened and spread to her BL arms and trunk back abd and face. Denies any known exacerbating or alleviating factors. Associated symptoms are tachycardia and fatigue. She reports seeing an allergist and dermatologist however symptoms persisted and worsening Admitted for further evaluation. (18 Mar 2024 08:34)    Hospital Course: Pt presents to [facility] for a progressing painful rash x 2 months. Sed rate 120, CRP 17.  CXR negative. Per Derm-> start clobetasol 0.05% ointment BID to affected areas on BODY (Avoid face, groin, skin folds). Preliminary biopsy review consistent with granulomatous process. Follow-up official pathology report for skin biopsy performed 3/18. Wound care with daily vaseline and band-aid placement. GI also consulted for  abnormal ALP. table ~ 182, AST/ALT/TBL wnl CARTER, SMA, AMA, A1AT, iron panel, TSH, Celiac panel, CK, hepatitis A/B/C serologies - all negative, abdominal US grossly unremarkable. MRCP -> Normal liver morphology. No significant steatosis. A 1.2 cm right hepatic lobe hemangioma. No suspicious liver lesion.  Normal biliary tree without ductal dilatation. Pancreatic divisum. A 0.8 cm sidebranch IPMN within the pancreatic  head. Per GI _____________***. Rheum also following rec -CT chest as part of evaluation for sarcoidosis. ->No definite CT findings of infection, malignancy, or sarcoidosis within the chest, abdomen, or pelvis, as clinically questioned. Nonspecific left upper lobe pulmonary nodule measuring 3 mm.  Bilateral thyroid nodules measuring up to 1.8 cm.   Pt medically stable for discharge home.       Important Medication Changes and Reason:    Active or Pending Issues Requiring Follow-up:    Advanced Directives:   [x ] Full code  [ ] DNR  [ ] Hospice    Discharge Diagnoses:  Rash   Transaminitis   DM  HLD         HPI:  This is a 63 year old female PMH: HLD and DM2. presents to [facility] for a progressing painful rash x 2 months. Rash began on her BL wrists and was seen and treated for contact dermatitis, however the rash has since worsened and spread to her BL arms and trunk back abd and face. Denies any known exacerbating or alleviating factors. Associated symptoms are tachycardia and fatigue. She reports seeing an allergist and dermatologist however symptoms persisted and worsening Admitted for further evaluation. (18 Mar 2024 08:34)    Hospital Course: Pt presents to [facility] for a progressing painful rash x 2 months. Sed rate 120, CRP 17.  CXR negative. Per Derm-> start clobetasol 0.05% ointment BID to affected areas on BODY (Avoid face, groin, skin folds). Preliminary biopsy review consistent with granulomatous process. Follow-up official pathology report for skin biopsy performed 3/18. Wound care with daily vaseline and band-aid placement. GI also consulted for  abnormal ALP. table ~ 182, AST/ALT/TBL wnl CARTER, SMA, AMA, A1AT, iron panel, TSH, Celiac panel, CK, hepatitis A/B/C serologies - all negative, abdominal US grossly unremarkable. MRCP -> Normal liver morphology. No significant steatosis. A 1.2 cm right hepatic lobe hemangioma. No suspicious liver lesion.  Normal biliary tree without ductal dilatation. Pancreatic divisum. A 0.8 cm sidebranch IPMN within the pancreatic  head. Per GI repeat MRI in 6 months to monitor pancreatic cyst.. Rheum also following rec -CT chest as part of evaluation for sarcoidosis. ->No definite CT findings of infection, malignancy, or sarcoidosis within the chest, abdomen, or pelvis, as clinically questioned. Nonspecific left upper lobe pulmonary nodule measuring 3 mm.  Bilateral thyroid nodules measuring up to 1.8 cm.   Pt medically stable for discharge home.       Important Medication Changes and Reason:    Active or Pending Issues Requiring Follow-up:    Advanced Directives:   [x ] Full code  [ ] DNR  [ ] Hospice    Discharge Diagnoses:  Rash   Transaminitis   DM  HLD

## 2024-03-22 NOTE — PROGRESS NOTE ADULT - ASSESSMENT
The patient is a  63F with a PMH of DM, presenting for a rash. GI consulted for abnormal ALP.    1. Rash s/p biopsy   path appreciated   per derm, rheum     2. Abnormal ALP  stable ~ 182, AST/ALT/TBL wnl   CARTER, SMA, AMA, A1AT, iron panel, TSH, Celiac panel, CK, hepatitis A/B/C serologies - all negative   abdominal US grossly unremarkable   pending MRCP      I had a prolonged conversation with the patient regarding the hospital course, differential diagnosis, results of diagnostic tests this far, and therapeutic modalities available. Plan of care discussed with the patient after the evaluation. Patient expresses a clear understanding of the plan of care. Fifty minutes spent on the total encounter, of which more than fifty percent of the encounter was spent on counseling and/or coordinating care by the attending physician. Advanced care planning forms were discussed. Code status including forceful chest compressions, defibrillation and intubation were discussed. The risks benefits and alternatives to pertinent gastrointestinal procedures and interventions were discussed in detail and all questions were answered. Duration: 15 Minutes.      Hank Storm D.O.  Gastroenterology and Hepatology  [address] [place] [address], [address]  [place], [place]  Office: [phone]

## 2024-03-22 NOTE — PROGRESS NOTE ADULT - SUBJECTIVE AND OBJECTIVE BOX
Chief Complaint:  Patient is a 63y old  Female who presents with a chief complaint of Painful Rash (22 Mar 2024 05:13)      Date of service 03-22-24 @ 10:14      Interval Events:   no acute GI events     Hospital Medications:  acetaminophen     Tablet .. 650 milliGRAM(s) Oral every 6 hours PRN  atorvastatin 40 milliGRAM(s) Oral at bedtime  chlorhexidine 2% Cloths 1 Application(s) Topical daily  clobetasol 0.05% Ointment 1 Application(s) Topical two times a day  dextrose 5%. 1000 milliLiter(s) IV Continuous <Continuous>  dextrose 5%. 1000 milliLiter(s) IV Continuous <Continuous>  dextrose 50% Injectable 25 Gram(s) IV Push once  dextrose 50% Injectable 12.5 Gram(s) IV Push once  dextrose 50% Injectable 25 Gram(s) IV Push once  dextrose Oral Gel 15 Gram(s) Oral once PRN  gabapentin 300 milliGRAM(s) Oral three times a day  glucagon  Injectable 1 milliGRAM(s) IntraMuscular once  influenza   Vaccine 0.5 milliLiter(s) IntraMuscular once  insulin lispro (ADMELOG) corrective regimen sliding scale   SubCutaneous three times a day before meals  lisinopril 20 milliGRAM(s) Oral daily  LORazepam     Tablet 1 milliGRAM(s) Oral once        Review of Systems:  General:  No wt loss, fevers, chills, night sweats, fatigue,   Eyes:  Good vision, no reported pain  ENT:  No sore throat, pain, runny nose, dysphagia  CV:  No pain, palpitations, hypo/hypertension  Resp:  No dyspnea, cough, tachypnea, wheezing  GI:  See HPI  :  No pain, bleeding, incontinence, nocturia  Muscle:  No pain, weakness  Neuro:  No weakness, tingling, memory problems  Psych:  No fatigue, insomnia, mood problems, depression  Endocrine:  No polyuria, polydipsia, cold/heat intolerance  Heme:  No petechiae, ecchymosis, easy bruisability  Integumentary:  No rash, edema    PHYSICAL EXAM:   Vital Signs:  Vital Signs Last 24 Hrs  T(C): 36.4 (22 Mar 2024 04:32), Max: 36.7 (21 Mar 2024 20:20)  T(F): 97.5 (22 Mar 2024 04:32), Max: 98.1 (21 Mar 2024 20:20)  HR: 103 (22 Mar 2024 07:01) (93 - 104)  BP: 135/76 (22 Mar 2024 07:01) (94/62 - 135/76)  BP(mean): --  RR: 18 (22 Mar 2024 04:32) (18 - 18)  SpO2: 98% (22 Mar 2024 04:32) (97% - 98%)    Parameters below as of 22 Mar 2024 04:32  Patient On (Oxygen Delivery Method): room air      Daily     Daily       PHYSICAL EXAM:     GENERAL:  Appears stated age, well-groomed, well-nourished, no distress  HEENT:  NC/AT,  conjunctivae anicteric, clear and pink,   NECK: supple, trachea midline  CHEST:  Full & symmetric excursion, no increased effort, breath sounds clear  HEART:  Regular rhythm, no JVD  ABDOMEN:  Soft, non-tender, non-distended, normoactive bowel sounds,  no masses , no hepatosplenomegaly  EXTREMITIES:  no cyanosis,clubbing or edema  SKIN:  No rash, erythema, or, ecchymoses, no jaundice  NEURO:  Alert, non-focal, no asterixis  PSYCH: Appropriate affect, oriented to place and time  RECTAL: Deferred      LABS Personally reviewed by me:                        11.3   9.16  )-----------( 344      ( 22 Mar 2024 06:52 )             36.5     Mean Cell Volume: 80.2 fl (03-22-24 @ 06:52)    03-22    138  |  103  |  16  ----------------------------<  286<H>  4.4   |  22  |  0.61    Ca    10.1      22 Mar 2024 06:53    TPro  7.2  /  Alb  x   /  TBili  x   /  DBili  x   /  AST  x   /  ALT  x   /  AlkPhos  x   03-20    LIVER FUNCTIONS - ( 20 Mar 2024 15:02 )  Alb: x     / Pro: 7.2 g/dL / ALK PHOS: x     / ALT: x     / AST: x     / GGT: x             Urinalysis Basic - ( 22 Mar 2024 06:53 )    Color: x / Appearance: x / SG: x / pH: x  Gluc: 286 mg/dL / Ketone: x  / Bili: x / Urobili: x   Blood: x / Protein: x / Nitrite: x   Leuk Esterase: x / RBC: x / WBC x   Sq Epi: x / Non Sq Epi: x / Bacteria: x                              11.3   9.16  )-----------( 344      ( 22 Mar 2024 06:52 )             36.5                         11.3   9.32  )-----------( 349      ( 21 Mar 2024 06:50 )             35.0                         12.3   9.01  )-----------( 376      ( 20 Mar 2024 07:04 )             39.2                         12.0   8.21  )-----------( 336      ( 19 Mar 2024 15:55 )             38.1       Imaging personally reviewed by me:

## 2024-03-22 NOTE — DISCHARGE NOTE PROVIDER - NSDCFUSCHEDAPPT_GEN_ALL_CORE_FT
Olivia Hickman  [facility]  DERM [address]  Scheduled Appointment: 03/28/2024    Jesenia Mello  [facility]  [place] [id number] [facility]  Scheduled Appointment: 04/02/2024

## 2024-03-22 NOTE — DISCHARGE NOTE PROVIDER - NSDCCPCAREPLAN_GEN_ALL_CORE_FT
PRINCIPAL DISCHARGE DIAGNOSIS  Diagnosis: Body rash  Assessment and Plan of Treatment: You were seen by Derm-> Preliminary biopsy review consistent with granulomatous process. Follow-up official pathology report for skin biopsy performed 3/18.  For BODY: continue clobetasol 0.05% ointment BID to affected areas on BODY (Avoid face, groin, skin folds) PRN itch for up to 2 weeks at a time, with at least 1 week break before resuming as needed. Please order 5 tubes as patient has large body surface area involved.  - For FACE: start hydrocortisone 2.5% ointment BID PRN itch to affected areas for up to 2 weeks at a time   Wound care with daily vaseline and band-aid placement        SECONDARY DISCHARGE DIAGNOSES  Diagnosis: Transaminitis  Assessment and Plan of Treatment: GI also consulted for  abnormal ALP. table ~ 182, AST/ALT/TBL wnl CARTER, SMA, AMA, A1AT, iron panel, TSH, Celiac panel, CK, hepatitis A/B/C serologies - all negative, abdominal US grossly unremarkable. MRCP -> Normal liver morphology.   Recommended______________________*******    Diagnosis: Abnormal chest CT  Assessment and Plan of Treatment: Bilateral thyroid nodules measuring up to 1.8 cm.   Please follow up outpt for Thyroid US.  Nonspecific left upper lobe pulmonary nodule measuring 3 mm.  Please follow up with your PCP     PRINCIPAL DISCHARGE DIAGNOSIS  Diagnosis: Body rash  Assessment and Plan of Treatment: You were seen by Derm-> Preliminary biopsy review consistent with granulomatous process. Follow-up official pathology report for skin biopsy performed 3/18.  For BODY: continue clobetasol 0.05% ointment BID to affected areas on BODY (Avoid face, groin, skin folds) PRN itch for up to 2 weeks at a time, with at least 1 week break before resuming as needed. Please order 5 tubes as patient has large body surface area involved.  - For FACE: start hydrocortisone 2.5% ointment BID PRN itch to affected areas for up to 2 weeks at a time   Wound care with daily vaseline and band-aid placement        SECONDARY DISCHARGE DIAGNOSES  Diagnosis: Transaminitis  Assessment and Plan of Treatment: GI also consulted for  abnormal ALP. table ~ 182, AST/ALT/TBL wnl CARTER, SMA, AMA, A1AT, iron panel, TSH, Celiac panel, CK, hepatitis A/B/C serologies - all negative, abdominal US grossly unremarkable. MRCP -> Normal liver morphology, pancreatic cyst.   **Repeat MRI in 6 months to monitor pancreatic cyst.***      Diagnosis: Abnormal chest CT  Assessment and Plan of Treatment: Bilateral thyroid nodules measuring up to 1.8 cm.   Please follow up outpt for Thyroid US.  Nonspecific left upper lobe pulmonary nodule measuring 3 mm.  Please follow up with your PCP

## 2024-03-22 NOTE — DISCHARGE NOTE PROVIDER - CARE PROVIDERS DIRECT ADDRESSES
[email or web address]

## 2024-03-22 NOTE — DISCHARGE NOTE NURSING/CASE MANAGEMENT/SOCIAL WORK - PATIENT PORTAL LINK FT
You can access the FollowMyHealth Patient Portal offered by [facility] by registering at the following website: [email or web address]. By joining [employer]’s FollowMyHealth portal, you will also be able to view your health information using other applications (apps) compatible with our system.

## 2024-03-22 NOTE — PROGRESS NOTE ADULT - REASON FOR ADMISSION
Painful Rash

## 2024-03-22 NOTE — DISCHARGE NOTE PROVIDER - CARE PROVIDER_API CALL
Olivia Hickman  Dermatology  [address], [address]  [place] [address]  Phone: [phone]  Fax: [phone]  Follow Up Time:     Jesenia Mello  Rheumatology  [address]  [place] [address]  Phone: [phone]  Fax: [phone]  Follow Up Time: 1 week

## 2024-03-22 NOTE — PROGRESS NOTE ADULT - SUBJECTIVE AND OBJECTIVE BOX
SUBJECTIVE / OVERNIGHT EVENTS:          --------------------------------------------------------------------------------------------  LABS:                        11.3   9.16  )-----------( 344      ( 22 Mar 2024 06:52 )             36.5     03-22    138  |  103  |  16  ----------------------------<  286<H>  4.4   |  22  |  0.61    Ca    10.1      22 Mar 2024 06:53    TPro  7.2  /  Alb  x   /  TBili  x   /  DBili  x   /  AST  x   /  ALT  x   /  AlkPhos  x   03-20      CAPILLARY BLOOD GLUCOSE      POCT Blood Glucose.: 288 mg/dL (22 Mar 2024 07:23)  POCT Blood Glucose.: 293 mg/dL (21 Mar 2024 21:11)  POCT Blood Glucose.: 325 mg/dL (21 Mar 2024 17:14)  POCT Blood Glucose.: 292 mg/dL (21 Mar 2024 11:19)        Urinalysis Basic - ( 22 Mar 2024 06:53 )    Color: x / Appearance: x / SG: x / pH: x  Gluc: 286 mg/dL / Ketone: x  / Bili: x / Urobili: x   Blood: x / Protein: x / Nitrite: x   Leuk Esterase: x / RBC: x / WBC x   Sq Epi: x / Non Sq Epi: x / Bacteria: x        RADIOLOGY & ADDITIONAL TESTS:    Imaging Personally Reviewed:  [x] YES  [ ] NO    Consultant(s) Notes Reviewed:  [x] YES  [ ] NO    MEDICATIONS  (STANDING):  atorvastatin 40 milliGRAM(s) Oral at bedtime  chlorhexidine 2% Cloths 1 Application(s) Topical daily  clobetasol 0.05% Ointment 1 Application(s) Topical two times a day  dextrose 5%. 1000 milliLiter(s) (100 mL/Hr) IV Continuous <Continuous>  dextrose 5%. 1000 milliLiter(s) (50 mL/Hr) IV Continuous <Continuous>  dextrose 50% Injectable 25 Gram(s) IV Push once  dextrose 50% Injectable 12.5 Gram(s) IV Push once  dextrose 50% Injectable 25 Gram(s) IV Push once  gabapentin 300 milliGRAM(s) Oral three times a day  glucagon  Injectable 1 milliGRAM(s) IntraMuscular once  influenza   Vaccine 0.5 milliLiter(s) IntraMuscular once  insulin lispro (ADMELOG) corrective regimen sliding scale   SubCutaneous three times a day before meals  lisinopril 20 milliGRAM(s) Oral daily  LORazepam     Tablet 1 milliGRAM(s) Oral once    MEDICATIONS  (PRN):  acetaminophen     Tablet .. 650 milliGRAM(s) Oral every 6 hours PRN Mild Pain (1 - 3)  dextrose Oral Gel 15 Gram(s) Oral once PRN Blood Glucose LESS THAN 70 milliGRAM(s)/deciliter      Care Discussed with Consultants/Other Providers [x] YES  [ ] NO    Vital Signs Last 24 Hrs  T(C): 36.4 (22 Mar 2024 04:32), Max: 36.7 (21 Mar 2024 20:20)  T(F): 97.5 (22 Mar 2024 04:32), Max: 98.1 (21 Mar 2024 20:20)  HR: 103 (22 Mar 2024 07:01) (93 - 104)  BP: 135/76 (22 Mar 2024 07:01) (94/62 - 135/76)  BP(mean): --  RR: 18 (22 Mar 2024 04:32) (18 - 18)  SpO2: 98% (22 Mar 2024 04:32) (97% - 98%)    Parameters below as of 22 Mar 2024 04:32  Patient On (Oxygen Delivery Method): room air      I&O's Summary    21 Mar 2024 07:01  -  22 Mar 2024 07:00  --------------------------------------------------------  IN: 480 mL / OUT: 250 mL / NET: 230 mL     PHYSICAL EXAM:  GENERAL: NAD, well-developed, comfortable  HEAD:  Atraumatic, Normocephalic  EYES: EOMI, PERRLA, conjunctiva and sclera clear  NECK: Supple, No JVD  CHEST/LUNG: Clear to auscultation bilaterally; No wheeze  HEART: Regular rate and rhythm; No murmurs, rubs, or gallops  ABDOMEN: Soft, Nontender, Nondistended; Bowel sounds present  NEURO: AAOx3, no focal weakness, 5/5 b/l extremity strength, b/l knee no arthritis, no effusion   EXTREMITIES:  2+ Peripheral Pulses, No clubbing, cyanosis, or edema  SKIN: + raised rash throughout entire upper body     SUBJECTIVE / OVERNIGHT EVENTS:    patient seen and examined  resting comfortably OOB in chair  rash improving  MRCP results pending     --------------------------------------------------------------------------------------------  LABS:                        11.3   9.16  )-----------( 344      ( 22 Mar 2024 06:52 )             36.5     03-22    138  |  103  |  16  ----------------------------<  286<H>  4.4   |  22  |  0.61    Ca    10.1      22 Mar 2024 06:53    TPro  7.2  /  Alb  x   /  TBili  x   /  DBili  x   /  AST  x   /  ALT  x   /  AlkPhos  x   03-20      CAPILLARY BLOOD GLUCOSE      POCT Blood Glucose.: 288 mg/dL (22 Mar 2024 07:23)  POCT Blood Glucose.: 293 mg/dL (21 Mar 2024 21:11)  POCT Blood Glucose.: 325 mg/dL (21 Mar 2024 17:14)  POCT Blood Glucose.: 292 mg/dL (21 Mar 2024 11:19)        Urinalysis Basic - ( 22 Mar 2024 06:53 )    Color: x / Appearance: x / SG: x / pH: x  Gluc: 286 mg/dL / Ketone: x  / Bili: x / Urobili: x   Blood: x / Protein: x / Nitrite: x   Leuk Esterase: x / RBC: x / WBC x   Sq Epi: x / Non Sq Epi: x / Bacteria: x        RADIOLOGY & ADDITIONAL TESTS:    Imaging Personally Reviewed:  [x] YES  [ ] NO    Consultant(s) Notes Reviewed:  [x] YES  [ ] NO    MEDICATIONS  (STANDING):  atorvastatin 40 milliGRAM(s) Oral at bedtime  chlorhexidine 2% Cloths 1 Application(s) Topical daily  clobetasol 0.05% Ointment 1 Application(s) Topical two times a day  dextrose 5%. 1000 milliLiter(s) (100 mL/Hr) IV Continuous <Continuous>  dextrose 5%. 1000 milliLiter(s) (50 mL/Hr) IV Continuous <Continuous>  dextrose 50% Injectable 25 Gram(s) IV Push once  dextrose 50% Injectable 12.5 Gram(s) IV Push once  dextrose 50% Injectable 25 Gram(s) IV Push once  gabapentin 300 milliGRAM(s) Oral three times a day  glucagon  Injectable 1 milliGRAM(s) IntraMuscular once  influenza   Vaccine 0.5 milliLiter(s) IntraMuscular once  insulin lispro (ADMELOG) corrective regimen sliding scale   SubCutaneous three times a day before meals  lisinopril 20 milliGRAM(s) Oral daily  LORazepam     Tablet 1 milliGRAM(s) Oral once    MEDICATIONS  (PRN):  acetaminophen     Tablet .. 650 milliGRAM(s) Oral every 6 hours PRN Mild Pain (1 - 3)  dextrose Oral Gel 15 Gram(s) Oral once PRN Blood Glucose LESS THAN 70 milliGRAM(s)/deciliter      Care Discussed with Consultants/Other Providers [x] YES  [ ] NO    Vital Signs Last 24 Hrs  T(C): 36.4 (22 Mar 2024 04:32), Max: 36.7 (21 Mar 2024 20:20)  T(F): 97.5 (22 Mar 2024 04:32), Max: 98.1 (21 Mar 2024 20:20)  HR: 103 (22 Mar 2024 07:01) (93 - 104)  BP: 135/76 (22 Mar 2024 07:01) (94/62 - 135/76)  BP(mean): --  RR: 18 (22 Mar 2024 04:32) (18 - 18)  SpO2: 98% (22 Mar 2024 04:32) (97% - 98%)    Parameters below as of 22 Mar 2024 04:32  Patient On (Oxygen Delivery Method): room air      I&O's Summary    21 Mar 2024 07:01  -  22 Mar 2024 07:00  --------------------------------------------------------  IN: 480 mL / OUT: 250 mL / NET: 230 mL     PHYSICAL EXAM:  GENERAL: NAD, well-developed, comfortable  HEAD:  Atraumatic, Normocephalic  EYES: EOMI, PERRLA, conjunctiva and sclera clear  NECK: Supple, No JVD  CHEST/LUNG: Clear to auscultation bilaterally; No wheeze  HEART: Regular rate and rhythm; No murmurs, rubs, or gallops  ABDOMEN: Soft, Nontender, Nondistended; Bowel sounds present  NEURO: AAOx3, no focal weakness, 5/5 b/l extremity strength, b/l knee no arthritis, no effusion   EXTREMITIES:  2+ Peripheral Pulses, No clubbing, cyanosis, or edema  SKIN: + raised rash throughout entire upper body

## 2024-03-23 LAB
% ALBUMIN: 47 % — SIGNIFICANT CHANGE UP
% ALBUMIN: 47.3 % — SIGNIFICANT CHANGE UP
% ALPHA 1: 4.9 % — SIGNIFICANT CHANGE UP
% ALPHA 1: 5.2 % — SIGNIFICANT CHANGE UP
% ALPHA 2: 13.9 % — SIGNIFICANT CHANGE UP
% ALPHA 2: 14.6 % — SIGNIFICANT CHANGE UP
% BETA: 13.6 % — SIGNIFICANT CHANGE UP
% BETA: 14.1 % — SIGNIFICANT CHANGE UP
% GAMMA: 19.6 % — SIGNIFICANT CHANGE UP
% GAMMA: 19.8 % — SIGNIFICANT CHANGE UP
ALBUMIN SERPL ELPH-MCNC: 3.4 G/DL — LOW (ref 3.6–5.5)
ALBUMIN SERPL ELPH-MCNC: 3.9 G/DL — SIGNIFICANT CHANGE UP (ref 3.6–5.5)
ALBUMIN/GLOB SERPL ELPH: 0.9 RATIO — SIGNIFICANT CHANGE UP
ALBUMIN/GLOB SERPL ELPH: 0.9 RATIO — SIGNIFICANT CHANGE UP
ALPHA1 GLOB SERPL ELPH-MCNC: 0.4 G/DL — SIGNIFICANT CHANGE UP (ref 0.1–0.4)
ALPHA1 GLOB SERPL ELPH-MCNC: 0.4 G/DL — SIGNIFICANT CHANGE UP (ref 0.1–0.4)
ALPHA2 GLOB SERPL ELPH-MCNC: 1 G/DL — SIGNIFICANT CHANGE UP (ref 0.5–1)
ALPHA2 GLOB SERPL ELPH-MCNC: 1.2 G/DL — HIGH (ref 0.5–1)
B-GLOBULIN SERPL ELPH-MCNC: 1 G/DL — SIGNIFICANT CHANGE UP (ref 0.5–1)
B-GLOBULIN SERPL ELPH-MCNC: 1.1 G/DL — HIGH (ref 0.5–1)
CULTURE RESULTS: SIGNIFICANT CHANGE UP
DSDNA AB FLD-ACNC: <0.2 AI — SIGNIFICANT CHANGE UP
ENA JO1 AB SER-ACNC: <0.2 AI — SIGNIFICANT CHANGE UP
ENA SS-A AB FLD IA-ACNC: <0.2 AI — SIGNIFICANT CHANGE UP
GAMMA GLOBULIN: 1.4 G/DL — SIGNIFICANT CHANGE UP (ref 0.6–1.6)
GAMMA GLOBULIN: 1.6 G/DL — SIGNIFICANT CHANGE UP (ref 0.6–1.6)
INTERPRETATION SERPL IFE-IMP: SIGNIFICANT CHANGE UP
PROT PATTERN SERPL ELPH-IMP: SIGNIFICANT CHANGE UP
PROT PATTERN SERPL ELPH-IMP: SIGNIFICANT CHANGE UP
PROT SERPL-MCNC: 7.2 G/DL — SIGNIFICANT CHANGE UP (ref 6–8.3)
PROT SERPL-MCNC: 8.2 G/DL — SIGNIFICANT CHANGE UP (ref 6–8.3)
PROT SERPL-MCNC: 8.2 G/DL — SIGNIFICANT CHANGE UP (ref 6–8.3)
SPECIMEN SOURCE: SIGNIFICANT CHANGE UP
T PALLIDUM AB TITR SER: NEGATIVE — SIGNIFICANT CHANGE UP
VIT D25+D1,25 OH+D1,25 PNL SERPL-MCNC: 71.4 PG/ML — SIGNIFICANT CHANGE UP (ref 19.9–79.3)

## 2024-03-25 LAB
ACE SERPL-CCNC: 5 U/L — LOW (ref 14–82)
ANA PAT FLD IF-IMP: ABNORMAL
ANA TITR SER: ABNORMAL

## 2024-03-27 ENCOUNTER — NON-APPOINTMENT (OUTPATIENT)
Age: 64
End: 2024-03-27

## 2024-03-27 NOTE — CHART NOTE - NSCHARTNOTEFT_GEN_A_CORE
Patient was outreached but did not answer. A voicemail was left for the patient to return our call [phone] and [phone]

## 2024-03-28 ENCOUNTER — LABORATORY RESULT (OUTPATIENT)
Age: 64
End: 2024-03-28

## 2024-03-28 ENCOUNTER — APPOINTMENT (OUTPATIENT)
Dept: DERMATOLOGY | Facility: CLINIC | Age: 64
End: 2024-03-28
Payer: COMMERCIAL

## 2024-03-28 DIAGNOSIS — D48.5 NEOPLASM OF UNCERTAIN BEHAVIOR OF SKIN: ICD-10-CM

## 2024-03-28 DIAGNOSIS — L71.9 ROSACEA, UNSPECIFIED: ICD-10-CM

## 2024-03-28 LAB
GAMMA INTERFERON BACKGROUND BLD IA-ACNC: 0.06 IU/ML — SIGNIFICANT CHANGE UP
M TB IFN-G BLD-IMP: NEGATIVE — SIGNIFICANT CHANGE UP
M TB IFN-G CD4+ BCKGRND COR BLD-ACNC: 0 IU/ML — SIGNIFICANT CHANGE UP
M TB IFN-G CD4+CD8+ BCKGRND COR BLD-ACNC: 0 IU/ML — SIGNIFICANT CHANGE UP
QUANT TB PLUS MITOGEN MINUS NIL: 1.16 IU/ML — SIGNIFICANT CHANGE UP
SURGICAL PATHOLOGY STUDY: SIGNIFICANT CHANGE UP

## 2024-03-28 PROCEDURE — 11105 PUNCH BX SKIN EA SEP/ADDL: CPT

## 2024-03-28 PROCEDURE — 99214 OFFICE O/P EST MOD 30 MIN: CPT | Mod: 25

## 2024-03-28 PROCEDURE — 11104 PUNCH BX SKIN SINGLE LESION: CPT

## 2024-03-28 RX ORDER — METRONIDAZOLE 7.5 MG/G
0.75 CREAM TOPICAL
Qty: 1 | Refills: 2 | Status: ACTIVE | COMMUNITY
Start: 2024-03-28 | End: 1900-01-01

## 2024-04-01 ENCOUNTER — NON-APPOINTMENT (OUTPATIENT)
Age: 64
End: 2024-04-01

## 2024-04-02 ENCOUNTER — APPOINTMENT (OUTPATIENT)
Dept: RHEUMATOLOGY | Facility: CLINIC | Age: 64
End: 2024-04-02
Payer: COMMERCIAL

## 2024-04-02 VITALS
SYSTOLIC BLOOD PRESSURE: 130 MMHG | RESPIRATION RATE: 16 BRPM | DIASTOLIC BLOOD PRESSURE: 84 MMHG | OXYGEN SATURATION: 96 % | BODY MASS INDEX: 21.71 KG/M2 | WEIGHT: 118 LBS | HEIGHT: 62 IN | HEART RATE: 102 BPM

## 2024-04-02 DIAGNOSIS — R70.0 ELEVATED ERYTHROCYTE SEDIMENTATION RATE: ICD-10-CM

## 2024-04-02 PROCEDURE — G2211 COMPLEX E/M VISIT ADD ON: CPT

## 2024-04-02 PROCEDURE — 99214 OFFICE O/P EST MOD 30 MIN: CPT

## 2024-04-03 NOTE — PHYSICAL EXAM
[General Appearance - In No Acute Distress] : in no acute distress [General Appearance - Alert] : alert [Sclera] : the sclera and conjunctiva were normal [Nasal Cavity] : the nasal mucosa and septum were normal [Respiration, Rhythm And Depth] : normal respiratory rhythm and effort [Auscultation Breath Sounds / Voice Sounds] : lungs were clear to auscultation bilaterally [Heart Rate And Rhythm] : heart rate was normal and rhythm regular [Heart Sounds] : normal S1 and S2 [Abdomen Soft] : soft [Musculoskeletal - Swelling] : no joint swelling seen [FreeTextEntry1] : raised erythematous rash on dorsum of hands [No Focal Deficits] : no focal deficits [Oriented To Time, Place, And Person] : oriented to person, place, and time [Impaired Insight] : insight and judgment were intact

## 2024-04-03 NOTE — ASSESSMENT
[FreeTextEntry1] : Granulomatous dermatitis  - Coalescing erythematous rash  - pathology: granulomatous inflammation - distribution of rash is suggestive of DM, however biopsy is not consistent with DM.  - Improved with topical steroids # CARTER positive 1:80 speckled Negative: Sjogren's, Bebe-1, RF, ACE, Vitamin D 1,25, CK # Elevated inflammatory markers # CT chest with no definite CT findings of infection, malignancy, or sarcoidosis # Paraproteinemia  = complete serology = fu repeat skin bx results = has pending heme-onc appointment    RTO 3-4 weeks to discuss results of complete evaluation.

## 2024-04-03 NOTE — HISTORY OF PRESENT ILLNESS
[FreeTextEntry1] : 63 F with PMH of T2DM, HLD, thyroid nodule, palpitations, ZIYAD/ BSO, and SBO 2/2 adhesions presenting with acute worsening of chronic rash that first appeared on the forearms and has since spread up the arms to her torso, face, and posterior neck. Patient also noted to have tachycardia to 110s, elevated inflammatory markers, elevated alk phos. Rheumatology consulted for further evaluation to r/o CTD.  # Rash - coalescing erythematous and edematous papules, pruritic - preliminary pathology: granulomatous inflammation - while the distribution of rash is suggestive of DM, however biopsy is not consistent with DM. Differential includes sarcoidosis, infectious - Patient not endorsing any fevers/chills, weight loss, night sweats, joint pain or swelling, eye pain or redness, oral ulcers, vision changes, urinary changes, or other symptoms outside of spread of rash and itch/pain -Improved with topical steroids # Elevated inflammatory markers- r/o underlying CTD CARTER positive 1:80 speckled.  Negative: Sjogren's, Bebe-1, RF, ACE, Vitamin D 1,25, CK MI-2 pending CT chest with no definite CT findings of infection, malignancy, or sarcoidosis within the chest, abdomen, or pelvis, as clinically questioned.

## 2024-04-09 LAB — MI2 AB SER QL: NEGATIVE — SIGNIFICANT CHANGE UP

## 2024-04-11 ENCOUNTER — APPOINTMENT (OUTPATIENT)
Dept: DERMATOLOGY | Facility: CLINIC | Age: 64
End: 2024-04-11
Payer: COMMERCIAL

## 2024-04-11 DIAGNOSIS — L30.9 DERMATITIS, UNSPECIFIED: ICD-10-CM

## 2024-04-11 LAB — DERMATOLOGY BIOPSY: NORMAL

## 2024-04-11 PROCEDURE — 99215 OFFICE O/P EST HI 40 MIN: CPT

## 2024-04-11 RX ORDER — MINOCYCLINE HYDROCHLORIDE 100 MG/1
100 CAPSULE ORAL DAILY
Qty: 60 | Refills: 0 | Status: ACTIVE | COMMUNITY
Start: 2024-04-11 | End: 1900-01-01

## 2024-04-17 LAB
ALBUMIN SERPL ELPH-MCNC: 4.3 G/DL
ALP BLD-CCNC: 107 U/L
ALT SERPL-CCNC: 22 U/L
ANA PAT FLD IF-IMP: ABNORMAL
ANA SER IF-ACNC: ABNORMAL
ANION GAP SERPL CALC-SCNC: 14 MMOL/L
AST SERPL-CCNC: 15 U/L
BASOPHILS # BLD AUTO: 0.04 K/UL
BASOPHILS NFR BLD AUTO: 0.3 %
BILIRUB SERPL-MCNC: 0.2 MG/DL
BUN SERPL-MCNC: 15 MG/DL
C3 SERPL-MCNC: 215 MG/DL
C4 SERPL-MCNC: 65 MG/DL
CALCIUM SERPL-MCNC: 10.5 MG/DL
CHLORIDE SERPL-SCNC: 99 MMOL/L
CK SERPL-CCNC: 60 U/L
CO2 SERPL-SCNC: 25 MMOL/L
CREAT SERPL-MCNC: 0.65 MG/DL
CRP SERPL-MCNC: 16 MG/L
DSDNA AB SER-ACNC: <1 IU/ML
EGFR: 99 ML/MIN/1.73M2
ENA RNP AB SER IA-ACNC: <0.2 AL
ENA SM AB SER IA-ACNC: <0.2 AL
EOSINOPHIL # BLD AUTO: 0.19 K/UL
EOSINOPHIL NFR BLD AUTO: 1.6 %
ERYTHROCYTE [SEDIMENTATION RATE] IN BLOOD BY WESTERGREN METHOD: 29 MM/HR
FERRITIN SERPL-MCNC: 57 NG/ML
GLUCOSE SERPL-MCNC: 158 MG/DL
HCT VFR BLD CALC: 41.5 %
HGB BLD-MCNC: 13 G/DL
HISTONE AB SER QL: 0.6 UNITS
IMM GRANULOCYTES NFR BLD AUTO: 0.4 %
LYMPHOCYTES # BLD AUTO: 2.57 K/UL
LYMPHOCYTES NFR BLD AUTO: 22.3 %
MAN DIFF?: NORMAL
MCHC RBC-ENTMCNC: 25.5 PG
MCHC RBC-ENTMCNC: 31.3 GM/DL
MCV RBC AUTO: 81.4 FL
MONOCYTES # BLD AUTO: 0.72 K/UL
MONOCYTES NFR BLD AUTO: 6.2 %
NEUTROPHILS # BLD AUTO: 7.97 K/UL
NEUTROPHILS NFR BLD AUTO: 69.2 %
PLATELET # BLD AUTO: 393 K/UL
POTASSIUM SERPL-SCNC: 5.4 MMOL/L
PROT SERPL-MCNC: 7.9 G/DL
RBC # BLD: 5.1 M/UL
RBC # FLD: 14.5 %
RIBOSOMAL P AB SER IA-ACNC: <0.2 AL
SODIUM SERPL-SCNC: 138 MMOL/L
WBC # FLD AUTO: 11.54 K/UL

## 2024-04-18 LAB
EJ AB SER QL: NEGATIVE
ENA JO1 AB SER IA-ACNC: <20 UNITS
ENA PM/SCL AB SER-ACNC: <20 UNITS
ENA SM+RNP AB SER IA-ACNC: <20 UNITS
ENA SS-A IGG SER QL: <20 UNITS
FIBRILLARIN AB SER QL: NEGATIVE
KU AB SER QL: NEGATIVE
MDA-5 (P140)(CADM-140): <20 UNITS
MI2 AB SER QL: NEGATIVE
NXP-2 (P140): <20 UNITS
OJ AB SER QL: NEGATIVE
PL12 AB SER QL: NEGATIVE
PL7 AB SER QL: NEGATIVE
SRP AB SERPL QL: NEGATIVE
TIF GAMMA (P155/140): <20 UNITS
U2 SNRNP AB SER QL: NEGATIVE

## 2024-04-22 ENCOUNTER — NON-APPOINTMENT (OUTPATIENT)
Age: 64
End: 2024-04-22

## 2024-04-24 LAB
DEPRECATED KAPPA LC FREE/LAMBDA SER: 1.31 RATIO
IGA SER QL IEP: 239 MG/DL
IGG SER QL IEP: 1534 MG/DL
IGM SER QL IEP: 62 MG/DL
KAPPA LC CSF-MCNC: 3.26 MG/DL
KAPPA LC SERPL-MCNC: 4.28 MG/DL

## 2024-04-29 LAB — NBT BLD QL: ABNORMAL

## 2024-05-02 ENCOUNTER — APPOINTMENT (OUTPATIENT)
Dept: DERMATOLOGY | Facility: CLINIC | Age: 64
End: 2024-05-02
Payer: COMMERCIAL

## 2024-05-02 LAB — G6PD SER-CCNC: 16 U/G HGB

## 2024-05-02 PROCEDURE — 11104 PUNCH BX SKIN SINGLE LESION: CPT

## 2024-05-02 PROCEDURE — 99214 OFFICE O/P EST MOD 30 MIN: CPT | Mod: 25

## 2024-05-02 RX ORDER — RAMIPRIL 5 MG/1
5 CAPSULE ORAL
Refills: 0 | Status: ACTIVE | COMMUNITY

## 2024-05-02 RX ORDER — SEMAGLUTIDE 1.34 MG/ML
4 INJECTION, SOLUTION SUBCUTANEOUS
Refills: 0 | Status: ACTIVE | COMMUNITY

## 2024-05-02 RX ORDER — SIMVASTATIN 40 MG/1
40 TABLET, FILM COATED ORAL
Refills: 0 | Status: ACTIVE | COMMUNITY

## 2024-05-02 RX ORDER — GABAPENTIN 300 MG/1
300 CAPSULE ORAL 3 TIMES DAILY
Refills: 0 | Status: ACTIVE | COMMUNITY

## 2024-05-02 RX ORDER — TACROLIMUS 1 MG/G
0.1 OINTMENT TOPICAL
Qty: 1 | Refills: 2 | Status: ACTIVE | COMMUNITY
Start: 2024-05-02 | End: 1900-01-01

## 2024-05-02 RX ORDER — GLYBURIDE AND METFORMIN HYDROCHLORIDE 2.5; 5 MG/1; MG/1
2.5-5 TABLET, FILM COATED ORAL TWICE DAILY
Refills: 0 | Status: ACTIVE | COMMUNITY

## 2024-05-03 ENCOUNTER — APPOINTMENT (OUTPATIENT)
Dept: RHEUMATOLOGY | Facility: CLINIC | Age: 64
End: 2024-05-03
Payer: COMMERCIAL

## 2024-05-03 VITALS
DIASTOLIC BLOOD PRESSURE: 91 MMHG | BODY MASS INDEX: 21.53 KG/M2 | HEART RATE: 121 BPM | WEIGHT: 117 LBS | HEIGHT: 62 IN | OXYGEN SATURATION: 98 % | SYSTOLIC BLOOD PRESSURE: 139 MMHG

## 2024-05-03 DIAGNOSIS — D89.2 HYPERGAMMAGLOBULINEMIA, UNSPECIFIED: ICD-10-CM

## 2024-05-03 PROCEDURE — 99214 OFFICE O/P EST MOD 30 MIN: CPT

## 2024-05-03 PROCEDURE — G2211 COMPLEX E/M VISIT ADD ON: CPT | Mod: NC,1L

## 2024-05-04 PROBLEM — D89.2 PARAPROTEINEMIA: Status: ACTIVE | Noted: 2024-04-02

## 2024-05-04 RX ORDER — HYDROXYCHLOROQUINE SULFATE 200 MG/1
200 TABLET, FILM COATED ORAL
Qty: 60 | Refills: 3 | Status: ACTIVE | COMMUNITY
Start: 2024-05-04 | End: 1900-01-01

## 2024-05-04 NOTE — ASSESSMENT
[FreeTextEntry1] : Granulomatous dermatitis  - extensive rash with no improvement on topical tx, now on MInocycline # CARTER positive 1:80 speckled Negative: Sjogren's, Bebe-1, RF, ACE, Vitamin D 1,25, CK # Elevated inflammatory markers # CT chest with no definite CT findings of infection, malignancy, or sarcoidosis # Paraproteinemia  = discussed labs = start Plaquenil. I discussed the addition of Plaquenil (hydroxychloroquine), which can be very effective for the skin lesions associated with many rheumatologic diseases, such as lupus, dermatomyositis, etc.  The main precaution necessary for Plaquenil is a baseline ophthalmologic exam and follow-up exams once a year.  This is due to the relation to chloroquine which can cause changes in color vision and limitation in peripheral vision due to pigment deposition in the posterior retina.  This is a very rare complication with Plaquenil itself.  We also monitor for changes in cell counts and the possibility of skin pigmentation changes = plan for bone marrow with heme-onc    RTO 3-4 weeks to discuss results of complete evaluation.

## 2024-05-04 NOTE — HISTORY OF PRESENT ILLNESS
[FreeTextEntry1] :   # Granulomatous dermatitis  - presented as coalescing erythematous and edematous papules, pruritic - in distribution of rash is suggestive of DM, however biopsy is not consistent with DM.  - pathology: granulomatous inflammation - No fevers/chills, weight loss, night sweats, joint pain or swelling, eye pain or redness, oral ulcers, vision changes, urinary changes, or other symptoms outside of spread of rash and itch/pain  # Elevated inflammatory markers CARTER positive 1:80 speckled.  Negative: Sjogren's, Bebe-1, RF, ACE, Vitamin D 1,25, CK MI-2 pending CT chest with no definite CT findings of infection, malignancy, or sarcoidosis within the chest, abdomen, or pelvis, as clinically questioned.    PMH:  T2DM, HLD, thyroid nodule, palpitations, ZIYAD/ BSO, and SBO 2/2 adhesions  Tazorac Pregnancy And Lactation Text: This medication is not safe during pregnancy. It is unknown if this medication is excreted in breast milk.

## 2024-05-04 NOTE — PHYSICAL EXAM
[General Appearance - Alert] : alert [General Appearance - In No Acute Distress] : in no acute distress [Sclera] : the sclera and conjunctiva were normal [Nasal Cavity] : the nasal mucosa and septum were normal [Respiration, Rhythm And Depth] : normal respiratory rhythm and effort [Auscultation Breath Sounds / Voice Sounds] : lungs were clear to auscultation bilaterally [Heart Rate And Rhythm] : heart rate was normal and rhythm regular [Heart Sounds] : normal S1 and S2 [Abdomen Soft] : soft [FreeTextEntry1] : raised erythematous rash on dorsum of hands [No Focal Deficits] : no focal deficits [Oriented To Time, Place, And Person] : oriented to person, place, and time [Impaired Insight] : insight and judgment were intact [Musculoskeletal - Swelling] : no joint swelling seen

## 2024-05-06 NOTE — HISTORY OF PRESENT ILLNESS
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 Foster Bhatti- [phone])   Derm history from 3/18 hospitalization:  Dermatology consulted on 3/18 for evaluation of progressive rash on upper body (waist up). Started as small patch on L forearm and over weeks progressed to involve R arm. At one point had rash on face as well, which improved significantly with doxycycline 100mg x 1 mo. Pt notes that when rash began to progress, she sought outpatient evaluation at [facility] dermatology and had a biopsy which showed spongiotic dermatitis; she was told she had a contact reaction and to change her skin products to fragrance free (now using All free and clear detergent, dove soap, and aquaphor and eucerin to moisturize); has not seen improvement in rash since changing these products. Lives with sister and has cat at home; sister is not itchy. Has tried several prescription topical steroids including Hydrocortisone 2.5% cream, fluocinonide ointment, and triamcinolone 01.% ointment, none of which have stopped progression of the rash. Has also been trying Triamcinolone on the face bumps.  Presented for evaluation due to continued progression which worsened approx 1 week ago.   Dyes her hair every few weeks-months, uses same dye product consistently. Underwent patch testing at an outside allergy practice, recalls being told she has sensitivity to [place] and unsure of the other 2 allergens. No new medications over past few months. Does not take supplements.  No recent travel. Denies spending much time outdoors over past few weeks/months.   Denies fever/chills, weight loss, night sweats, swollen lymph nodes, shortness of breath, chest pain, abdominal pain, n/v/d, leg swelling.   Of note, pt was seen by Dermatology during [facility] admission in late October 2023, noted to have diffuse rash in the setting of fevers, hypotension, elevated inflammatory markers. Biopsy on 10/30 was consistent with dermal hypersensitivity reaction, possibly 2/2 viral process. Extensive infectious workup at that time was negative.   Hx from Nov 2023: Hospital f/u. Rash w/ fever , GI sxs and mild leukocytosis-- infectious etiology suspected however workup negative. ESR >80. Bx of rash c/w dermal hypersensitivity.  Rash now resolved w/ dark spots. Still sometimes itchy. Fevers also resolved but has been having arrythmias, wearing heart monitor. Also scheduled to see ID soon.   IMAGING:  CT 3/2024  *  No definite CT findings of infection, malignancy, or sarcoidosis within the chest, abdomen, or pelvis, as clinically questioned. *  Nonspecific left upper lobe pulmonary nodule measuring 3 mm. Please see the Fleischner guidelines below. *  Bilateral thyroid nodules measuring up to 1.8 cm. Recommend thyroid ultrasound for further evaluation. *  The known subcentimeter cystic lesion in the pancreatic head is better appreciated on the previous MRI.  Single nodule less than 6 mm does not require routine follow-up. Certain patients at high risk with suspicious nodule morphology, upper lobe location or both may warrant 12-month follow-up. -- ?Reference: Guidelines for Management of Incidental Pulmonary Nodules Detected on CT Images: From the Fleischner Society 2017?

## 2024-05-06 NOTE — ASSESSMENT
[FreeTextEntry1] : # Granulomatous dermatitis NOS - ddx: granulomatous (sarcoid vs. PNGD vs infectious but thus far no growth) - Notable labs ESR elevated at 120-->29, ACE 5 (low)  - Notable damon with rheum (4/2024): CARTER 1:80 speckled - SPEP and JARRETT without monoclonal disease, Serum free light chains (R 1.36), serum immunoglobin levels (IgG 1668), LDH (209), Beta-microglobulin (2.6)  -  Urine FLC ratio >9, serum FLCs wnl, complete paraproteinemia workup as above so far negative  - NORMAL/NEG labs: RF, syphilis screen (T Pallidum), quant gold, C3/C4, dsDNA, MALLIKA, histone, ribosomal, CGD w/u, myomarker panel BIOPSIES  - BX 11/2023: Left upper arm: Dermal hypersensitivity reaction, see comment.There is mild acanthosis, spongiosis, dermal edema, and moderate superficial and deep dermal perivascular lymphocytic infiltrate with extravasated erythrocytes.  These findings can be seen in an urticarial tissue reaction, or dermal hypersensitivity reaction to an infection drug or systemic disorder.  There is no evidence for a neutrophilic dermatosis, vasculitis or connective tissue disease. DIF negative, Fibrinogen:  Patchy staining of connective tissue fibers in the dermis, non-diagnostic  and there is no evidence for  an autoimmune bullous disorder, lupus erythematosus or vasculitis  - bx from 3/18: Lichenoid and granulomatous dermatitis. PAS, Mele and AFB stains, and gram stain- negative.  T. pallidum immuno stain is negative for spirochetes. If infectious causes have been excluded then the differential diagnosis includes unusual drug eruption.  - BX 3/28: Superficial dermal and perifollicular granulomatous inflammation,  this could represent granulomatous rosacea. However, the involvement of head and neck and forearms is most unusual, thus the differential diagnosis includes sarcoidosis. PAS stain is negative for hyphae.   AFB stain is negative for acid fast microorganisms.  - BX DIF 3/28 Negative aside from Fibrinogen: patchy connective tissue fibers staining, non-diagnostic and there is no evidence of an autoimmune bullous disorder, lupus erythematosus or vasculitis.  PLAN  - agree with BM biopsy, although no monoclonal abnornality noted on serum testing, need to make sure nothing intrinsic driving skin rash. HEME-->Dr. Jessee Vela - can c/w minocycline 100mg BID for now. SED including but not limited to photosensitivity and GI upset.  - consider plaquenil as well as MTX (will hold off pending BM bx) - HOLD PO steroids for now however may consider for symptomatic relief and as bridge to MTX - repeat biopsy as below - For BODY: continue clobetasol 0.05% ointment BID to affected areas on BODY (Avoid face, groin, skin folds) PRN itch for up to 2 weeks at a time, with at least 1 week break before resuming as needed.  - FACE: C/W metrocream to face BID And tacrolimus 0.1% to face BID. SED including black box warning.    #NUB, left thumb ddx sarcoid vs MRH vs PNGD vs ganrulomatous dx NOS Biopsy by 4mm Punch.  The risks/benefits/alternatives of skin biopsy were explained to the patient, which include and are not limited to bleeding, infection, scarring or discoloration of skin, and recurrence of lesion. Patient expressed understanding of these risks and provided consent to the procedure. Time out with verification of patient and lesion site was performed. Site was prepped with rubbing alcohol, lidocaine with epinephrine was injected for anesthesia, and biopsy was performed. Specimen sent to path. Procedure was without complication and well tolerated. Wound care was discussed.  #LUE swelling r/o phlebitis vs DVT LUE u/s ordered

## 2024-05-06 NOTE — PHYSICAL EXAM
[FreeTextEntry3] : edematous pink papules and papules coalescing into lichenified plaques on bilateral dorsal hands , arms, central chest, cheeks  Occipital scalp and posterior neck with circumscribed pink scaly plaque -

## 2024-05-09 ENCOUNTER — NON-APPOINTMENT (OUTPATIENT)
Age: 64
End: 2024-05-09

## 2024-05-13 ENCOUNTER — APPOINTMENT (OUTPATIENT)
Dept: DERMATOLOGY | Facility: CLINIC | Age: 64
End: 2024-05-13
Payer: COMMERCIAL

## 2024-05-13 DIAGNOSIS — M79.89 OTHER SPECIFIED SOFT TISSUE DISORDERS: ICD-10-CM

## 2024-05-13 DIAGNOSIS — L30.8 OTHER SPECIFIED DERMATITIS: ICD-10-CM

## 2024-05-13 PROCEDURE — 99215 OFFICE O/P EST HI 40 MIN: CPT

## 2024-05-13 RX ORDER — TACROLIMUS 1 MG/G
0.1 OINTMENT TOPICAL
Qty: 1 | Refills: 2 | Status: ACTIVE | COMMUNITY
Start: 2024-03-28 | End: 1900-01-01

## 2024-05-14 ENCOUNTER — APPOINTMENT (OUTPATIENT)
Dept: ULTRASOUND IMAGING | Facility: IMAGING CENTER | Age: 64
End: 2024-05-14

## 2024-05-16 ENCOUNTER — NON-APPOINTMENT (OUTPATIENT)
Age: 64
End: 2024-05-16

## 2024-05-16 PROCEDURE — 88291 CYTO/MOLECULAR REPORT: CPT

## 2024-05-16 NOTE — HISTORY OF PRESENT ILLNESS
[FreeTextEntry1] : hospital f/u [de-identified] : MAGDA RODRIGUEZ is a 63 year old F here for evaluation of below  # Granulomatous dermatitis, had prior been on minocycline 100mg BID since  4/11/2024 since stopped, had been flaring worse on arms, chest, hand lesions persist, also with facial lesions at  5/2/24. Was very upset and [id number] over her skin condition, pending BM biopsy with hematology. 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  - Sees ophtho (Dr. Foster Bhatti- [phone])   Derm history from 3/18 hospitalization:  Dermatology consulted on 3/18 for evaluation of progressive rash on upper body (waist up). Started as small patch on L forearm and over weeks progressed to involve R arm. At one point had rash on face as well, which improved significantly with doxycycline 100mg x 1 mo. Pt notes that when rash began to progress, she sought outpatient evaluation at [facility] dermatology and had a biopsy which showed spongiotic dermatitis; she was told she had a contact reaction and to change her skin products to fragrance free (now using All free and clear detergent, dove soap, and aquaphor and eucerin to moisturize); has not seen improvement in rash since changing these products. Lives with sister and has cat at home; sister is not itchy. Has tried several prescription topical steroids including Hydrocortisone 2.5% cream, fluocinonide ointment, and triamcinolone 01.% ointment, none of which have stopped progression of the rash. Has also been trying Triamcinolone on the face bumps.  Presented for evaluation due to continued progression which worsened approx 1 week ago.   Dyes her hair every few weeks-months, uses same dye product consistently. Underwent patch testing at an outside allergy practice, recalls being told she has sensitivity to [place] and unsure of the other 2 allergens. No new medications over past few months. Does not take supplements.  No recent travel. Denies spending much time outdoors over past few weeks/months.   Denies fever/chills, weight loss, night sweats, swollen lymph nodes, shortness of breath, chest pain, abdominal pain, n/v/d, leg swelling.   Of note, pt was seen by Dermatology during [facility] admission in late October 2023, noted to have diffuse rash in the setting of fevers, hypotension, elevated inflammatory markers. Biopsy on 10/30 was consistent with dermal hypersensitivity reaction, possibly 2/2 viral process. Extensive infectious workup at that time was negative.   Hx from Nov 2023: Hospital f/u. Rash w/ fever , GI sxs and mild leukocytosis-- infectious etiology suspected however workup negative. ESR >80. Bx of rash c/w dermal hypersensitivity.  Rash now resolved w/ dark spots. Still sometimes itchy. Fevers also resolved but has been having arrythmias, wearing heart monitor. Also scheduled to see ID soon.   IMAGING:  CT 3/2024  *  No definite CT findings of infection, malignancy, or sarcoidosis within the chest, abdomen, or pelvis, as clinically questioned. *  Nonspecific left upper lobe pulmonary nodule measuring 3 mm. Please see the Fleischner guidelines below. *  Bilateral thyroid nodules measuring up to 1.8 cm. Recommend thyroid ultrasound for further evaluation. *  The known subcentimeter cystic lesion in the pancreatic head is better appreciated on the previous MRI.  Single nodule less than 6 mm does not require routine follow-up. Certain patients at high risk with suspicious nodule morphology, upper lobe location or both may warrant 12-month follow-up. -- ?Reference: Guidelines for Management of Incidental Pulmonary Nodules Detected on CT Images: From the Fleischner Society 2017?

## 2024-05-16 NOTE — ASSESSMENT
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 PNGD vs infectious but thus far no growth) - Notable labs ESR elevated at 120-->29, ACE 5 (low)  - Notable damon with rheum (4/2024): CARTER 1:80 speckled - SPEP and JARRETT without monoclonal disease, Serum free light chains (R 1.36), serum immunoglobin levels (IgG 1668), LDH (209), Beta-microglobulin (2.6)  -  Urine FLC ratio >9, serum FLCs wnl, complete paraproteinemia workup as above so far negative  - NORMAL/NEG labs: RF, syphilis screen (T Pallidum), quant gold, C3/C4, dsDNA, MALLIKA, histone, ribosomal, CGD w/u, myomarker panel BIOPSIES  - BX 5/2/24: Diffuse and palisading sarcoidal granulomas with central nerobiosis. Note: There are overlapping features of sarcoidosis and granuloma annulare in these sections. PAS and GMS stains are negative for hyphae. Mele and AFB stains are negative for acid fast microorganisms. - BX 11/2023: Left upper arm: Dermal hypersensitivity reaction, see comment.There is mild acanthosis, spongiosis, dermal edema, and moderate superficial and deep dermal perivascular lymphocytic infiltrate with extravasated erythrocytes.  These findings can be seen in an urticarial tissue reaction, or dermal hypersensitivity reaction to an infection drug or systemic disorder.  There is no evidence for a neutrophilic dermatosis, vasculitis or connective tissue disease. DIF negative, Fibrinogen:  Patchy staining of connective tissue fibers in the dermis, non-diagnostic  and there is no evidence for  an autoimmune bullous disorder, lupus erythematosus or vasculitis  - bx from 3/18: Lichenoid and granulomatous dermatitis. PAS, Mele and AFB stains, and gram stain- negative.  T. pallidum immuno stain is negative for spirochetes. If infectious causes have been excluded then the differential diagnosis includes unusual drug eruption.  - BX 3/28: Superficial dermal and perifollicular granulomatous inflammation,  this could represent granulomatous rosacea. However, the involvement of head and neck and forearms is most unusual, thus the differential diagnosis includes sarcoidosis. PAS stain is negative for hyphae.   AFB stain is negative for acid fast microorganisms.  - BX DIF 3/28 Negative aside from Fibrinogen: patchy connective tissue fibers staining, non-diagnostic and there is no evidence of an autoimmune bullous disorder, lupus erythematosus or vasculitis.  PLAN  - agree with BM biopsy, although no monoclonal abnornality noted on serum testing, need to make sure nothing intrinsic driving skin rash. HEME-->Dr. Jessee Vela - can cont to hold minocycline 100mg BID for now.  - consider plaquenil as well as MTX (will hold off pending BM bx).  - We will CONT HOLD PO steroids for now however may consider for symptomatic relief and as bridge to MTX. However, we discussed there is low threshold to initiate given the degree of emotional impact this condition has been on her. - We encouraged her to speak with psych or pyschologist given passive SI as we expressed our concern for her mental health. Reassurance provided that systemic agents as above will offer further improvement and we will follow her closely. She agreed to talk therapy for which we will help coordinate.  - For BODY: continue clobetasol 0.05% ointment BID to affected areas on BODY (Avoid face, groin, skin folds) PRN itch for up to 2 weeks at a time, with at least 1 week break before resuming as needed.  - FACE: C/W metrocream to face BID And tacrolimus 0.1% to face BID. SED including black box warning.  - ALSO provided opzelura 1% topical cream for which she can use for face & body.  - PLAN to coordinate in close follow-up pending BM biopsy so we can initiate systemic agent, plaquenil or MMF.   #high risk med mgmt - cbc,cmp, qg and hep serologies noted MTX: We have discussed possible adverse effects of this medication, including but not limited to fatigue, GI upset, hair loss, oral ulcers, low blood counts, inflammation of the liver, fibrosis of the liver or lungs, infections, cancers and hypersensitivity reaction. We have discussed that alcohol consumption must be eliminated while on this medication. We have discussed that pregnancy is non-viable while on this medication and 3mo after completing course, and that patient must take adequate measures to eliminate risk of conceiving or participation in conceiving while on this medication. Patient expressed understanding of these risks.       #LUE swelling, prior ordered LUE US but has since resolved, advised OK to hold this test for now. Instructed to reschedule it recurs  RTC pending discussion with other care teams pending BM biopsy.

## 2024-05-17 ENCOUNTER — NON-APPOINTMENT (OUTPATIENT)
Age: 64
End: 2024-05-17

## 2024-05-17 ENCOUNTER — OUTPATIENT (OUTPATIENT)
Dept: OUTPATIENT SERVICES | Facility: HOSPITAL | Age: 64
LOS: 1 days | End: 2024-05-17
Payer: COMMERCIAL

## 2024-05-17 DIAGNOSIS — R21 RASH AND OTHER NONSPECIFIC SKIN ERUPTION: ICD-10-CM

## 2024-05-17 LAB — GLUCOSE BLDC GLUCOMTR-MCNC: 138 MG/DL — HIGH (ref 70–99)

## 2024-05-17 PROCEDURE — 88342 IMHCHEM/IMCYTCHM 1ST ANTB: CPT | Mod: 26

## 2024-05-17 PROCEDURE — 85097 BONE MARROW INTERPRETATION: CPT

## 2024-05-17 PROCEDURE — 88291 CYTO/MOLECULAR REPORT: CPT

## 2024-05-17 PROCEDURE — 88305 TISSUE EXAM BY PATHOLOGIST: CPT | Mod: 26

## 2024-05-17 PROCEDURE — 38222 DX BONE MARROW BX & ASPIR: CPT | Mod: RT

## 2024-05-17 PROCEDURE — 77012 CT SCAN FOR NEEDLE BIOPSY: CPT | Mod: 26

## 2024-05-17 PROCEDURE — 88313 SPECIAL STAINS GROUP 2: CPT | Mod: 26

## 2024-05-17 PROCEDURE — 88341 IMHCHEM/IMCYTCHM EA ADD ANTB: CPT | Mod: 26

## 2024-05-17 PROCEDURE — 88189 FLOWCYTOMETRY/READ 16 & >: CPT | Mod: 1L,59

## 2024-05-17 RX ORDER — SEMAGLUTIDE 0.68 MG/ML
0.25 INJECTION, SOLUTION SUBCUTANEOUS
Refills: 0 | DISCHARGE

## 2024-05-17 RX ORDER — SIMVASTATIN 20 MG/1
1 TABLET, FILM COATED ORAL
Refills: 0 | DISCHARGE

## 2024-05-17 RX ORDER — GLYBURIDE-METFORMIN HYDROCHLORIDE 1.25; 25 MG/1; MG/1
2 TABLET ORAL
Refills: 0 | DISCHARGE

## 2024-05-17 RX ORDER — GABAPENTIN 400 MG/1
1 CAPSULE ORAL
Refills: 0 | DISCHARGE

## 2024-05-17 RX ORDER — RAMIPRIL 5 MG
1 CAPSULE ORAL
Refills: 0 | DISCHARGE

## 2024-05-17 NOTE — PRE PROCEDURE NOTE - PRE PROCEDURE EVALUATION
Interventional Radiology    HPI: 63y Female with HTN p/w painful rash found to have transaminitis concern for connective tissue disorder, referred to IR for bone marrow biopsy.     Allergies: penicillins (Swelling)    Medications (Abx/Cardiac/Anticoagulation/Blood Products)      Data:    Exam  General: No acute distress. Diffuse skin rash.   Chest: Non labored breathing  Abdomen: Non-distended  Extremities: No swelling, warm    Imaging:   - relevant imaging reviewed     Plan:     -- Plan for bone marrow biopsy  -- Relevant imaging and labs were reviewed.   -- Risks, benefits, and alternatives were explained to the patient and informed consent was obtained.

## 2024-05-17 NOTE — PROCEDURE NOTE - PROCEDURE FINDINGS AND DETAILS
Successful CT guided bone marrow biopsy. Aspirate and core taken and sent for path.   Pt tolerated the procedure well without immediate complication.

## 2024-05-20 LAB — DERMATOLOGY BIOPSY: NORMAL

## 2024-05-21 DIAGNOSIS — R21 RASH AND OTHER NONSPECIFIC SKIN ERUPTION: ICD-10-CM

## 2024-05-21 DIAGNOSIS — R74.01 ELEVATION OF LEVELS OF LIVER TRANSAMINASE LEVELS: ICD-10-CM

## 2024-05-21 LAB
BCR/ABL BY RT - PCR QUANTITATIVE: SIGNIFICANT CHANGE UP
DNA PLOIDY SPEC FC-IMP: SIGNIFICANT CHANGE UP
DNA PLOIDY SPEC FC-IMP: SIGNIFICANT CHANGE UP
JAK2 P.V617F BLD/T QL: SIGNIFICANT CHANGE UP

## 2024-05-22 LAB
BCL2IGH IGH RESULT: NEGATIVE — SIGNIFICANT CHANGE UP
BCL2IGH PATHOLOGIST INTERPRETATION: SIGNIFICANT CHANGE UP
CHROM ANALY INTERPHASE BLD FISH-IMP: SIGNIFICANT CHANGE UP
CLINICAL INFO: SIGNIFICANT CHANGE UP
SPECIMEN SOURCE.: SIGNIFICANT CHANGE UP
SPECIMEN SOURCE: SIGNIFICANT CHANGE UP

## 2024-05-23 LAB — TM INTERPRETATION: SIGNIFICANT CHANGE UP

## 2024-05-24 LAB — ONKOSIGHT MYELOID SEQUENCE: (no result)

## 2024-05-30 LAB — HEMATOPATHOLOGY REPORT: SIGNIFICANT CHANGE UP

## 2024-05-31 LAB — CHROM ANALY OVERALL INTERP SPEC-IMP: SIGNIFICANT CHANGE UP

## 2024-06-03 ENCOUNTER — APPOINTMENT (OUTPATIENT)
Dept: DERMATOLOGY | Facility: CLINIC | Age: 64
End: 2024-06-03
Payer: COMMERCIAL

## 2024-06-03 DIAGNOSIS — D86.9 SARCOIDOSIS, UNSPECIFIED: ICD-10-CM

## 2024-06-03 PROCEDURE — 99215 OFFICE O/P EST HI 40 MIN: CPT

## 2024-06-03 RX ORDER — CLOBETASOL PROPIONATE 0.5 MG/G
0.05 OINTMENT TOPICAL
Qty: 1 | Refills: 3 | Status: ACTIVE | COMMUNITY
Start: 2024-04-09 | End: 1900-01-01

## 2024-06-03 RX ORDER — FOLIC ACID 1 MG/1
1 TABLET ORAL DAILY
Qty: 1 | Refills: 3 | Status: ACTIVE | COMMUNITY
Start: 2024-06-03 | End: 1900-01-01

## 2024-06-03 RX ORDER — PREDNISONE 10 MG/1
10 TABLET ORAL
Qty: 60 | Refills: 1 | Status: ACTIVE | COMMUNITY
Start: 2024-06-03 | End: 1900-01-01

## 2024-06-05 ENCOUNTER — APPOINTMENT (OUTPATIENT)
Dept: RHEUMATOLOGY | Facility: CLINIC | Age: 64
End: 2024-06-05

## 2024-06-08 NOTE — HISTORY OF PRESENT ILLNESS
[FreeTextEntry1] : hospital f/u [de-identified] : MAGDA RODRIGUEZ is a 63 year old F here for evaluation of below  # Granulomatous dermatitis, had prior been on minocycline 100mg BID since  4/11/2024 since stopped, had been flaring worse on arms, chest, hand lesions persist, also with facial lesions at LV 5/2/24. Was very upset and depressed over her skin condition.  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 6/3/2024: stable lesions on arms, slightly improved on face, and progressive lesions on anterior neck. Using clobetasol, metronidazole, and opzelura. BM biopsy with hematology WNL. Started hydroxychloroquine for 2 weeks and stopped as she was not able to tolerate it (GI upset).   - Sees ophtho (Dr. Foster Bhatti- [phone])   Derm history from 3/18 hospitalization:  Dermatology consulted on 3/18 for evaluation of progressive rash on upper body (waist up). Started as small patch on L forearm and over weeks progressed to involve R arm. At one point had rash on face as well, which improved significantly with doxycycline 100mg x 1 mo. Pt notes that when rash began to progress, she sought outpatient evaluation at [facility] dermatology and had a biopsy which showed spongiotic dermatitis; she was told she had a contact reaction and to change her skin products to fragrance free (now using All free and clear detergent, dove soap, and aquaphor and eucerin to moisturize); has not seen improvement in rash since changing these products. Lives with sister and has cat at home; sister is not itchy. Has tried several prescription topical steroids including Hydrocortisone 2.5% cream, fluocinonide ointment, and triamcinolone 01.% ointment, none of which have stopped progression of the rash. Has also been trying Triamcinolone on the face bumps.  Presented for evaluation due to continued progression which worsened approx 1 week ago.   Dyes her hair every few weeks-months, uses same dye product consistently. Underwent patch testing at an outside allergy practice, recalls being told she has sensitivity to [place] and unsure of the other 2 allergens. No new medications over past few months. Does not take supplements.  No recent travel. Denies spending much time outdoors over past few weeks/months.   Denies fever/chills, weight loss, night sweats, swollen lymph nodes, shortness of breath, chest pain, abdominal pain, n/v/d, leg swelling.   Of note, pt was seen by Dermatology during [facility] admission in late October 2023, noted to have diffuse rash in the setting of fevers, hypotension, elevated inflammatory markers. Biopsy on 10/30 was consistent with dermal hypersensitivity reaction, possibly 2/2 viral process. Extensive infectious workup at that time was negative.   Hx from Nov 2023: Hospital f/u. Rash w/ fever , GI sxs and mild leukocytosis-- infectious etiology suspected however workup negative. ESR >80. Bx of rash c/w dermal hypersensitivity.  Rash now resolved w/ dark spots. Still sometimes itchy. Fevers also resolved but has been having arrythmias, wearing heart monitor. Also scheduled to see ID soon.   IMAGING:  CT 3/2024  *  No definite CT findings of infection, malignancy, or sarcoidosis within the chest, abdomen, or pelvis, as clinically questioned. *  Nonspecific left upper lobe pulmonary nodule measuring 3 mm. Please see the Fleischner guidelines below. *  Bilateral thyroid nodules measuring up to 1.8 cm. Recommend thyroid ultrasound for further evaluation. *  The known subcentimeter cystic lesion in the pancreatic head is better appreciated on the previous MRI.  Single nodule less than 6 mm does not require routine follow-up. Certain patients at high risk with suspicious nodule morphology, upper lobe location or both may warrant 12-month follow-up. -- ?Reference: Guidelines for Management of Incidental Pulmonary Nodules Detected on CT Images: From the Fleischner Society 2017?

## 2024-06-08 NOTE — ASSESSMENT
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 PNGD vs infectious but thus far no growth) - Notable labs ESR elevated at 120-->29, ACE 5 (low)  - Notable damon with rheum (4/2024): CARTER 1:80 speckled - SPEP and JARRETT without monoclonal disease, Serum free light chains (R 1.36), serum immunoglobin levels (IgG 1668), LDH (209), Beta-microglobulin (2.6)  -  Urine FLC ratio >9, serum FLCs wnl, complete paraproteinemia workup as above so far negative  - NORMAL/NEG labs: RF, syphilis screen (T Pallidum), quant gold, C3/C4, dsDNA, MALLIKA, histone, ribosomal, CGD w/u, myomarker panel - Bone marrow biopsy within normal limits per pt [facility]EME-->Dr. Jessee Vela - unable to tolerate Plaquenil (GI upset)  - s/p minocycline ~ 1-2 mos BIOPSIES  - BX 5/2/24: Diffuse and palisading sarcoidal granulomas with central nerobiosis. Note: There are overlapping features of sarcoidosis and granuloma annulare in these sections. PAS and GMS stains are negative for hyphae. Mele and AFB stains are negative for acid fast microorganisms. - BX 11/2023: Left upper arm: Dermal hypersensitivity reaction, see comment.There is mild acanthosis, spongiosis, dermal edema, and moderate superficial and deep dermal perivascular lymphocytic infiltrate with extravasated erythrocytes.  These findings can be seen in an urticarial tissue reaction, or dermal hypersensitivity reaction to an infection drug or systemic disorder.  There is no evidence for a neutrophilic dermatosis, vasculitis or connective tissue disease. DIF negative, Fibrinogen:  Patchy staining of connective tissue fibers in the dermis, non-diagnostic  and there is no evidence for  an autoimmune bullous disorder, lupus erythematosus or vasculitis  - bx from 3/18: Lichenoid and granulomatous dermatitis. PAS, Mele and AFB stains, and gram stain- negative.  T. pallidum immuno stain is negative for spirochetes. If infectious causes have been excluded then the differential diagnosis includes unusual drug eruption.  - BX 3/28: Superficial dermal and perifollicular granulomatous inflammation,  this could represent granulomatous rosacea. However, the involvement of head and neck and forearms is most unusual, thus the differential diagnosis includes sarcoidosis. PAS stain is negative for hyphae.   AFB stain is negative for acid fast microorganisms.  - BX DIF 3/28 Negative aside from Fibrinogen: patchy connective tissue fibers staining, non-diagnostic and there is no evidence of an autoimmune bullous disorder, lupus erythematosus or vasculitis.  PLAN  - We encouraged her to speak with psych or pyschologist given passive SI as we expressed our concern for her mental health. Reassurance provided that systemic agents as above will offer further improvement and we will follow her closely. She agreed to talk therapy for which we will help coordinate.  - For BODY: continue clobetasol 0.05% ointment BID to affected areas on BODY (Avoid face, groin, skin folds) PRN itch for up to 2 weeks at a time, with at least 1 week break before resuming as needed.  - FACE: C/W metrocream to face BID And tacrolimus 0.1% to face BID. SED including black box warning.  - ALSO provided opzelura 1% topical cream for which she can use for face & body.  - START Methotrexate 15 mg once weekly with Folic acid 1mg daily. We have discussed possible adverse effects of this medication, including but not limited to fatigue, GI upset, hair loss, oral ulcers, low blood counts, inflammation of the liver, fibrosis of the liver or lungs, infections, cancers and hypersensitivity reaction. We have discussed that alcohol consumption must be eliminated while on this medication. We have discussed that pregnancy is non-viable while on this medication and 3mo after completing course, and that patient must take adequate measures to eliminate risk of conceiving or participation in conceiving while on this medication. Patient expressed understanding of these risks.  - START prednisone 20mg qAM for 3 weeks  - r/b/a systemic steroids were discussed including but not limited to HPA axis suppression, immunosuppression, mood changes, risk of bone fracture, GI ulcer, weight gain, fluid retention, hyperglycemia and glaucoma  #high risk med mgmt - cbc,cmp, qg and hep serologies noted in [place]  - obtain CBC/CMP in 2 weeks   #LUE swelling, prior ordered LUE US but has since resolved, advised OK to hold this test for now. Instructed to reschedule it recurs  RTC 3 weeks   >45 min

## 2024-06-08 NOTE — PHYSICAL EXAM
[FreeTextEntry3] : edematous pink papules and papules coalescing into lichenified plaques on bilateral dorsal hands , arms, central chest, anterior neck, cheeks noted improvement in LUE swelling from previous visit

## 2024-06-13 ENCOUNTER — RX RENEWAL (OUTPATIENT)
Age: 64
End: 2024-06-13

## 2024-06-14 RX ORDER — METHOTREXATE 2.5 MG/1
2.5 TABLET ORAL
Qty: 77 | Refills: 0 | Status: ACTIVE | COMMUNITY
Start: 2024-06-03 | End: 1900-01-01

## 2024-06-23 LAB
ALBUMIN SERPL ELPH-MCNC: 4 G/DL
ALP BLD-CCNC: 82 U/L
ALT SERPL-CCNC: 20 U/L
ANION GAP SERPL CALC-SCNC: 13 MMOL/L
AST SERPL-CCNC: 10 U/L
BASOPHILS # BLD AUTO: 0.03 K/UL
BASOPHILS NFR BLD AUTO: 0.2 %
BILIRUB SERPL-MCNC: 0.2 MG/DL
BUN SERPL-MCNC: 13 MG/DL
CALCIUM SERPL-MCNC: 9.4 MG/DL
CHLORIDE SERPL-SCNC: 101 MMOL/L
CO2 SERPL-SCNC: 26 MMOL/L
CREAT SERPL-MCNC: 0.73 MG/DL
EGFR: 93 ML/MIN/1.73M2
EOSINOPHIL # BLD AUTO: 0.13 K/UL
EOSINOPHIL NFR BLD AUTO: 1 %
GLUCOSE SERPL-MCNC: 122 MG/DL
HCT VFR BLD CALC: 35.4 %
HGB BLD-MCNC: 10.8 G/DL
IMM GRANULOCYTES NFR BLD AUTO: 1.2 %
LYMPHOCYTES # BLD AUTO: 4.94 K/UL
LYMPHOCYTES NFR BLD AUTO: 36.3 %
MAN DIFF?: NORMAL
MCHC RBC-ENTMCNC: 25.2 PG
MCHC RBC-ENTMCNC: 30.5 GM/DL
MCV RBC AUTO: 82.5 FL
MONOCYTES # BLD AUTO: 0.89 K/UL
MONOCYTES NFR BLD AUTO: 6.5 %
NEUTROPHILS # BLD AUTO: 7.45 K/UL
NEUTROPHILS NFR BLD AUTO: 54.8 %
PLATELET # BLD AUTO: 310 K/UL
POTASSIUM SERPL-SCNC: 4.4 MMOL/L
PROT SERPL-MCNC: 6.8 G/DL
RBC # BLD: 4.29 M/UL
RBC # FLD: 15.9 %
SODIUM SERPL-SCNC: 141 MMOL/L
WBC # FLD AUTO: 13.6 K/UL

## 2024-06-25 ENCOUNTER — APPOINTMENT (OUTPATIENT)
Dept: DERMATOLOGY | Facility: CLINIC | Age: 64
End: 2024-06-25
Payer: COMMERCIAL

## 2024-06-25 DIAGNOSIS — Z79.899 OTHER LONG TERM (CURRENT) DRUG THERAPY: ICD-10-CM

## 2024-06-25 PROCEDURE — 99215 OFFICE O/P EST HI 40 MIN: CPT

## 2024-06-25 RX ORDER — PREDNISONE 10 MG/1
10 TABLET ORAL
Qty: 38 | Refills: 0 | Status: ACTIVE | COMMUNITY
Start: 2024-06-25 | End: 1900-01-01

## 2024-07-01 LAB
ALBUMIN SERPL ELPH-MCNC: 4 G/DL
ALP BLD-CCNC: 63 U/L
ALT SERPL-CCNC: 16 U/L
ANION GAP SERPL CALC-SCNC: 13 MMOL/L
AST SERPL-CCNC: 11 U/L
BASOPHILS # BLD AUTO: 0.02 K/UL
BASOPHILS NFR BLD AUTO: 0.2 %
BILIRUB SERPL-MCNC: 0.2 MG/DL
BUN SERPL-MCNC: 15 MG/DL
CALCIUM SERPL-MCNC: 9.4 MG/DL
CHLORIDE SERPL-SCNC: 101 MMOL/L
CO2 SERPL-SCNC: 25 MMOL/L
CREAT SERPL-MCNC: 0.77 MG/DL
EGFR: 87 ML/MIN/1.73M2
EOSINOPHIL # BLD AUTO: 0.17 K/UL
EOSINOPHIL NFR BLD AUTO: 1.7 %
GLUCOSE SERPL-MCNC: 121 MG/DL
HCT VFR BLD CALC: 36.8 %
HGB BLD-MCNC: 11.1 G/DL
IMM GRANULOCYTES NFR BLD AUTO: 0.7 %
LYMPHOCYTES # BLD AUTO: 4.3 K/UL
LYMPHOCYTES NFR BLD AUTO: 42.2 %
MAN DIFF?: NORMAL
MCHC RBC-ENTMCNC: 25.6 PG
MCHC RBC-ENTMCNC: 30.2 GM/DL
MCV RBC AUTO: 84.8 FL
MONOCYTES # BLD AUTO: 0.75 K/UL
MONOCYTES NFR BLD AUTO: 7.4 %
NEUTROPHILS # BLD AUTO: 4.89 K/UL
NEUTROPHILS NFR BLD AUTO: 47.8 %
PLATELET # BLD AUTO: 284 K/UL
POTASSIUM SERPL-SCNC: 4.6 MMOL/L
PROT SERPL-MCNC: 6.6 G/DL
RBC # BLD: 4.34 M/UL
RBC # FLD: 17.5 %
SODIUM SERPL-SCNC: 140 MMOL/L
WBC # FLD AUTO: 10.2 K/UL

## 2024-07-03 ENCOUNTER — NON-APPOINTMENT (OUTPATIENT)
Age: 64
End: 2024-07-03

## 2024-07-15 ENCOUNTER — NON-APPOINTMENT (OUTPATIENT)
Age: 64
End: 2024-07-15

## 2024-07-16 ENCOUNTER — APPOINTMENT (OUTPATIENT)
Dept: DERMATOLOGY | Facility: CLINIC | Age: 64
End: 2024-07-16

## 2024-07-30 ENCOUNTER — APPOINTMENT (OUTPATIENT)
Dept: DERMATOLOGY | Facility: CLINIC | Age: 64
End: 2024-07-30
Payer: COMMERCIAL

## 2024-07-30 PROCEDURE — G2211 COMPLEX E/M VISIT ADD ON: CPT | Mod: NC

## 2024-07-30 PROCEDURE — 99214 OFFICE O/P EST MOD 30 MIN: CPT

## 2024-07-30 RX ORDER — PREDNISONE 5 MG/1
5 TABLET ORAL
Qty: 20 | Refills: 0 | Status: ACTIVE | COMMUNITY
Start: 2024-07-30 | End: 1900-01-01

## 2024-08-04 NOTE — HISTORY OF PRESENT ILLNESS
[FreeTextEntry1] : hospital f/u [de-identified] : MAGDA RODRIGUEZ is a 63 year old F here for evaluation of below  # Granulomatous dermatitis NOS, ddx includes sarcoid vs GA,  LV 6/25/2024: Tapering down on prednisone, most recent dose 10mg, also taking MTX 15mg weekly, rash greatly improved  had prior been on had been flaring worse on arms, chest, hand lesions persist, also with facial lesions at LV 5/2/24. Was very upset and depressed over her skin condition.  5/2/24 bx  Diffuse and palisading sarcoidal granulomas with central nerobiosis. 6/3/2024: stable lesions on arms, slightly improved on face, and progressive lesions on anterior neck. Using clobetasol, metronidazole, and opzelura. BM biopsy with hematology WNL. Started hydroxychloroquine for 2 weeks and stopped as she was not able to tolerate it (GI upset).   - Sees ophtho (Dr. Foster Bhatti- [phone])   Derm history from 3/18 hospitalization:  Dermatology consulted on 3/18 for evaluation of progressive rash on upper body (waist up). Started as small patch on L forearm and over weeks progressed to involve R arm. At one point had rash on face as well, which improved significantly with doxycycline 100mg x 1 mo. Pt notes that when rash began to progress, she sought outpatient evaluation at [facility] and had a biopsy which showed spongiotic dermatitis; she was told she had a contact reaction and to change her skin products to fragrance free (now using All free and clear detergent, dove soap, and aquaphor and eucerin to moisturize); has not seen improvement in rash since changing these products. Lives with sister and has cat at home; sister is not itchy. Has tried several prescription topical steroids including Hydrocortisone 2.5% cream, fluocinonide ointment, and triamcinolone 01.% ointment, none of which have stopped progression of the rash. Has also been trying Triamcinolone on the face bumps.  Presented for evaluation due to continued progression which worsened approx 1 week ago.   Dyes her hair every few weeks-months, uses same dye product consistently. Underwent patch testing at an outside allergy practice, recalls being told she has sensitivity to [place] and unsure of the other 2 allergens. No new medications over past few months. Does not take supplements.  No recent travel. Denies spending much time outdoors over past few weeks/months.   Denies fever/chills, weight loss, night sweats, swollen lymph nodes, shortness of breath, chest pain, abdominal pain, n/v/d, leg swelling.   Of note, pt was seen by Dermatology during [facility] admission in late October 2023, noted to have diffuse rash in the setting of fevers, hypotension, elevated inflammatory markers. Biopsy on 10/30 was consistent with dermal hypersensitivity reaction, possibly 2/2 viral process. Extensive infectious workup at that time was negative.   Hx from Nov 2023: Hospital f/u. Rash w/ fever , GI sxs and mild leukocytosis-- infectious etiology suspected however workup negative. ESR >80. Bx of rash c/w dermal hypersensitivity.  Rash now resolved w/ dark spots. Still sometimes itchy. Fevers also resolved but has been having arrythmias, wearing heart monitor. Also scheduled to see ID soon.   IMAGING:  CT 3/2024  *  No definite CT findings of infection, malignancy, or sarcoidosis within the chest, abdomen, or pelvis, as clinically questioned. *  Nonspecific left upper lobe pulmonary nodule measuring 3 mm. Please see the Fleischner guidelines below. *  Bilateral thyroid nodules measuring up to 1.8 cm. Recommend thyroid ultrasound for further evaluation. *  The known subcentimeter cystic lesion in the pancreatic head is better appreciated on the previous MRI.  Single nodule less than 6 mm does not require routine follow-up. Certain patients at high risk with suspicious nodule morphology, upper lobe location or both may warrant 12-month follow-up. -- ?Reference: Guidelines for Management of Incidental Pulmonary Nodules Detected on CT Images: From the Fleischner Society 2017?

## 2024-08-04 NOTE — ASSESSMENT
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 -  Urine FLC ratio >9, serum FLCs wnl, complete paraproteinemia workup as above so far negative  - Bone marrow biopsy within normal limits per pt [facility]EM[facility]-->Dr. Jessee López - NORMAL/NEG labs: RF, syphilis screen (T Pallidum), quant gold, C3/C4, dsDNA, MALLIKA, histone, ribosomal, CGD w/u, myomarker panel - unable to tolerate Plaquenil (GI upset)  - failed minocycline x 2 mos - on pred since 6/4/2024 (started 20mg daily) - On MTX 15mg weekly since 6/4/2024 BIOPSIES  - BX 5/2/24: Diffuse and palisading sarcoidal granulomas with central nerobiosis. Note: There are overlapping features of sarcoidosis and granuloma annulare in these sections. PAS and GMS stains are negative for hyphae. Mele and AFB stains are negative for acid fast microorganisms. - BX 11/2023: Left upper arm: Dermal hypersensitivity reaction, see comment.There is mild acanthosis, spongiosis, dermal edema, and moderate superficial and deep dermal perivascular lymphocytic infiltrate with extravasated erythrocytes.  These findings can be seen in an urticarial tissue reaction, or dermal hypersensitivity reaction to an infection drug or systemic disorder.  There is no evidence for a neutrophilic dermatosis, vasculitis or connective tissue disease. DIF negative, Fibrinogen:  Patchy staining of connective tissue fibers in the dermis, non-diagnostic  and there is no evidence for  an autoimmune bullous disorder, lupus erythematosus or vasculitis  - bx from 3/18: Lichenoid and granulomatous dermatitis. PAS, Mele and AFB stains, and gram stain- negative.  T. pallidum immuno stain is negative for spirochetes. If infectious causes have been excluded then the differential diagnosis includes unusual drug eruption.  - BX 3/28: Superficial dermal and perifollicular granulomatous inflammation,  this could represent granulomatous rosacea. However, the involvement of head and neck and forearms is most unusual, thus the differential diagnosis includes sarcoidosis. PAS stain is negative for hyphae.   AFB stain is negative for acid fast microorganisms.  - BX DIF 3/28 Negative aside from Fibrinogen: patchy connective tissue fibers staining, non-diagnostic and there is no evidence of an autoimmune bullous disorder, lupus erythematosus or vasculitis.  PLAN   - For BODY: continue clobetasol 0.05% ointment BID to affected areas on BODY (Avoid face, groin, skin folds) PRN itch for up to 2 weeks at a time, with at least 1 week break before resuming as needed.  - FACE: C/W metrocream to face BID And tacrolimus 0.1% to face BID. SED including black box warning.  - ALSO provided opzelura 1% topical cream for which she can use for face & body.  - c/w Methotrexate 15 mg once weekly with Folic acid 1mg daily.  - DECREASE prednisone 10mg-->5mg x 2 weeks, 2.5mg x 2 weeks.  - r/b/a systemic steroids were discussed including but not limited to HPA axis suppression, immunosuppression, mood changes, risk of bone fracture, GI ulcer, weight gain, fluid retention, hyperglycemia and glaucoma  #high risk med mgmt - qg and hep serologies noted in [place]  -  CBC/CMP 7/1/2024 reviewed normocytic  anemia Hb 11.1--> pt says repeat labs w/ dr lópez hemoglobin improved   RTC 4 weeks

## 2024-08-04 NOTE — HISTORY OF PRESENT ILLNESS
[FreeTextEntry1] : hospital f/u [de-identified] : MAGDA RODRIGUEZ is a 63 year old F here for evaluation of below  # Granulomatous dermatitis NOS, ddx includes sarcoid vs GA,  LV 6/25/2024: Tapering down on prednisone, most recent dose 10mg, also taking MTX 15mg weekly, rash greatly improved  had prior been on had been flaring worse on arms, chest, hand lesions persist, also with facial lesions at LV 5/2/24. Was very upset and depressed over her skin condition.  5/2/24 bx  Diffuse and palisading sarcoidal granulomas with central nerobiosis. 6/3/2024: stable lesions on arms, slightly improved on face, and progressive lesions on anterior neck. Using clobetasol, metronidazole, and opzelura. BM biopsy with hematology WNL. Started hydroxychloroquine for 2 weeks and stopped as she was not able to tolerate it (GI upset).   - Sees ophtho (Dr. Foster Bhatti- [phone])   Derm history from 3/18 hospitalization:  Dermatology consulted on 3/18 for evaluation of progressive rash on upper body (waist up). Started as small patch on L forearm and over weeks progressed to involve R arm. At one point had rash on face as well, which improved significantly with doxycycline 100mg x 1 mo. Pt notes that when rash began to progress, she sought outpatient evaluation at [facility] and had a biopsy which showed spongiotic dermatitis; she was told she had a contact reaction and to change her skin products to fragrance free (now using All free and clear detergent, dove soap, and aquaphor and eucerin to moisturize); has not seen improvement in rash since changing these products. Lives with sister and has cat at home; sister is not itchy. Has tried several prescription topical steroids including Hydrocortisone 2.5% cream, fluocinonide ointment, and triamcinolone 01.% ointment, none of which have stopped progression of the rash. Has also been trying Triamcinolone on the face bumps.  Presented for evaluation due to continued progression which worsened approx 1 week ago.   Dyes her hair every few weeks-months, uses same dye product consistently. Underwent patch testing at an outside allergy practice, recalls being told she has sensitivity to [place] and unsure of the other 2 allergens. No new medications over past few months. Does not take supplements.  No recent travel. Denies spending much time outdoors over past few weeks/months.   Denies fever/chills, weight loss, night sweats, swollen lymph nodes, shortness of breath, chest pain, abdominal pain, n/v/d, leg swelling.   Of note, pt was seen by Dermatology during [facility] admission in late October 2023, noted to have diffuse rash in the setting of fevers, hypotension, elevated inflammatory markers. Biopsy on 10/30 was consistent with dermal hypersensitivity reaction, possibly 2/2 viral process. Extensive infectious workup at that time was negative.   Hx from Nov 2023: Hospital f/u. Rash w/ fever , GI sxs and mild leukocytosis-- infectious etiology suspected however workup negative. ESR >80. Bx of rash c/w dermal hypersensitivity.  Rash now resolved w/ dark spots. Still sometimes itchy. Fevers also resolved but has been having arrythmias, wearing heart monitor. Also scheduled to see ID soon.   IMAGING:  CT 3/2024  *  No definite CT findings of infection, malignancy, or sarcoidosis within the chest, abdomen, or pelvis, as clinically questioned. *  Nonspecific left upper lobe pulmonary nodule measuring 3 mm. Please see the Fleischner guidelines below. *  Bilateral thyroid nodules measuring up to 1.8 cm. Recommend thyroid ultrasound for further evaluation. *  The known subcentimeter cystic lesion in the pancreatic head is better appreciated on the previous MRI.  Single nodule less than 6 mm does not require routine follow-up. Certain patients at high risk with suspicious nodule morphology, upper lobe location or both may warrant 12-month follow-up. -- ?Reference: Guidelines for Management of Incidental Pulmonary Nodules Detected on CT Images: From the Fleischner Society 2017?

## 2024-08-19 NOTE — PATIENT PROFILE ADULT - NSPROHMSYMPCOND_GEN_A_NUR
"Daily Note     Today's date: 2024  Patient name: Alexa Hawley  : 1959  MRN: [id number]  Referring provider: Wai Baez, *  Dx:   Encounter Diagnosis     ICD-10-CM    1. Incontinence of feces, unspecified fecal incontinence type  R15.9                      Subjective: Patient notes she is doing very well. Was able to travel to [place] without issue. Increased confidence in ability to hold urine and stool. Reduced episodes of UI/FI. Wearing lesser pads and no bladder protection at night.        Objective: See treatment diary below      Assessment: Tolerated treatment well. Patient exhibited good technique with therapeutic exercises Independent in HEP. Improved symptoms with reduced leakage and increased control. At this time, patient has achieved their maximum functional benefit from skilled physical therapy services and will be discharged to their HEP.  Patient is in agreement with the plan of care.  As a result, patient is discharged from physical therapy. PT goals met.       Plan: Discharge PT to self care/ HEP     POC expires Unit limit Auth Expiration date PT/OT + Visit Limit?   24 4 NA BOMN                           Visit/Unit Tracking  AUTH Status:  Date 6/17 6/24 7/8 7/15 7/22 7/29 8/19        No AUTH/ $40 copay Used 1 2 3 4 5 6 7         Remaining                 PFDI done                    Precautions: DM HTN hysterectomy, head injury with seizure, gastric sleeve      Manuals 6/17 6/24 7/8 7/15 7/22 7/29 8/19      Transverse plane pelvic   10' 10' 10' 10' 10' 10'                                Ther Ex             CRK Eval pt ed                         LE stretch  10'  good nt nt nt  nt      Diaphragmatic breathing  2'  HEP 2' 2' 2'        Pfm relaxation/drop  3'  HEP 2'                       CRK for coordination  3\"/10\"/2x5 10x 10x 5\"/10\"  10x 5\"/10\"/10x 5\"/10\"/10x      Ball with [facility] and exhale  3\"/10x  HEP 3\"/  10x 3-5\"/10x 3-5\"/  10x 10x 10x      TB with ER and exhale  " "10x  BTB  HEP 10x 10x 10x  BTB 10x 10x      TB with ER and PF[facility]  10x  BTB  HEP 10x 10x 10x 10x 10x      Segmental bridge   10x   HEP 10x 2x10 10x 10x      clamshell    10x  HEP 10x 10x 10x      AS slow contraction   3-5\"/5\"  5x  HEP 5\"/10x 10x 10x       AS quick contraction   10x  HEP   10x                    Sit to stand with [facility]   nv 10x  HEP  10x AH                   Ther Activity             PFM education   PP            Bladder health HO  HEP            Squatty potty PP            Urge suppression   NV  PP  review                      Gait Training                                       Modalities                                                        " diabetes

## 2024-08-27 ENCOUNTER — APPOINTMENT (OUTPATIENT)
Dept: DERMATOLOGY | Facility: CLINIC | Age: 64
End: 2024-08-27
Payer: COMMERCIAL

## 2024-08-27 VITALS — WEIGHT: 122 LBS | BODY MASS INDEX: 22.45 KG/M2 | HEIGHT: 62 IN

## 2024-08-27 DIAGNOSIS — D86.9 SARCOIDOSIS, UNSPECIFIED: ICD-10-CM

## 2024-08-27 DIAGNOSIS — Z79.899 OTHER LONG TERM (CURRENT) DRUG THERAPY: ICD-10-CM

## 2024-08-27 DIAGNOSIS — L30.8 OTHER SPECIFIED DERMATITIS: ICD-10-CM

## 2024-08-27 PROCEDURE — G2211 COMPLEX E/M VISIT ADD ON: CPT | Mod: NC

## 2024-08-27 PROCEDURE — 99215 OFFICE O/P EST HI 40 MIN: CPT

## 2024-08-27 RX ORDER — PREDNISONE 2.5 MG/1
2.5 TABLET ORAL
Qty: 120 | Refills: 0 | Status: ACTIVE | COMMUNITY
Start: 2024-08-27 | End: 1900-01-01

## 2024-08-27 RX ORDER — METHOTREXATE 2.5 MG/1
2.5 TABLET ORAL
Qty: 32 | Refills: 1 | Status: ACTIVE | COMMUNITY
Start: 2024-08-27 | End: 1900-01-01

## 2024-09-02 NOTE — ASSESSMENT
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 -  Urine FLC ratio >9, serum FLCs wnl, complete paraproteinemia workup as above so far negative  - Bone marrow biopsy within normal limits per pt [facility]EM[facility]-->Dr. Jessee López - NORMAL/NEG labs: RF, syphilis screen (T Pallidum), quant gold, C3/C4, dsDNA, MALLIKA, histone, ribosomal, CGD w/u, myomarker panel - unable to tolerate Plaquenil (GI upset)  - failed minocycline x 2 mos - on pred since 6/4/2024 (started 20mg daily; currently Pred 5mg) - On MTX 15mg weekly since 6/4/2024; increased to MTX 20mg weekly as of 8/27/2024 BIOPSIES  - BX 5/2/24: Diffuse and palisading sarcoidal granulomas with central nerobiosis. Note: There are overlapping features of sarcoidosis and granuloma annulare in these sections. PAS and GMS stains are negative for hyphae. Mele and AFB stains are negative for acid fast microorganisms. - BX 11/2023: Left upper arm: Dermal hypersensitivity reaction, see comment.There is mild acanthosis, spongiosis, dermal edema, and moderate superficial and deep dermal perivascular lymphocytic infiltrate with extravasated erythrocytes.  These findings can be seen in an urticarial tissue reaction, or dermal hypersensitivity reaction to an infection drug or systemic disorder.  There is no evidence for a neutrophilic dermatosis, vasculitis or connective tissue disease. DIF negative, Fibrinogen:  Patchy staining of connective tissue fibers in the dermis, non-diagnostic  and there is no evidence for  an autoimmune bullous disorder, lupus erythematosus or vasculitis  - bx from 3/18: Lichenoid and granulomatous dermatitis. PAS, Mele and AFB stains, and gram stain- negative.  T. pallidum immuno stain is negative for spirochetes. If infectious causes have been excluded then the differential diagnosis includes unusual drug eruption.  - BX 3/28: Superficial dermal and perifollicular granulomatous inflammation,  this could represent granulomatous rosacea. However, the involvement of head and neck and forearms is most unusual, thus the differential diagnosis includes sarcoidosis. PAS stain is negative for hyphae.   AFB stain is negative for acid fast microorganisms.  - BX DIF 3/28 Negative aside from Fibrinogen: patchy connective tissue fibers staining, non-diagnostic and there is no evidence of an autoimmune bullous disorder, lupus erythematosus or vasculitis.  PLAN   - For BODY: continue clobetasol 0.05% ointment BID to affected areas on BODY (Avoid face, groin, skin folds) PRN itch for up to 2 weeks at a time, with at least 1 week break before resuming as needed.  - FACE: C/W metrocream to face BID And tacrolimus 0.1% to face BID. SED including black box warning.  - Previously provided opzelura 1% topical cream for which she can use for face & body; Hold at this time - Increase Methotrexate to 20mg (from 15mg) once weekly with Folic acid 1mg daily.  - Increase prednisone to 7.5mg daily (initially planned to decrease Pred 10mg-->5mg x 2 weeks, 2.5mg x 2 weeks, currently on 5mg, plan to increase to 7.5mg) - r/b/a systemic steroids were discussed including but not limited to HPA axis suppression, immunosuppression, mood changes, risk of bone fracture, GI ulcer, weight gain, fluid retention, hyperglycemia and glaucoma - Discussed the risks and benefits of transitioning treatment to Xeljanz and Rinvoq -- Ordered Xeljanz 5mg PO BID; SED -- pending PA process  #high risk med mgmt - qg and hep serologies noted in [facility]  -  CBC/CMP 7/1/2024 reviewed normocytic  anemia Hb 11.1--> pt says repeat labs w/ dr lópez hemoglobin improved - Ordered CBC/CMP today, instructed patient to obtain labwork at least 3 weeks after appointment - MTX: We have discussed possible adverse effects of this medication, including but not limited to fatigue, GI upset, hair loss, oral ulcers, low blood counts, inflammation of the liver, fibrosis of the liver or lungs, infections, cancers and hypersensitivity reaction. We have discussed that alcohol consumption must be eliminated while on this medication. We have discussed that pregnancy is non-viable while on this medication and 3mo after completing course, and that patient must take adequate measures to eliminate risk of conceiving or participation in conceiving while on this medication. Patient expressed understanding of these risks.  - XELJANZ: r/b/a discussed including but not limited to immunosuprression, HLS, increased rosk of CV events and clot, LFT elevation and rash     RTC 4 weeks

## 2024-09-02 NOTE — HISTORY OF PRESENT ILLNESS
[FreeTextEntry1] : RPV: granulomatous dermatitis / hospital follow up [de-identified] : MAGDA RODRIGUEZ is a 63 year old F here for evaluation of below  # Granulomatous dermatitis NOS, ddx includes sarcoid vs GA,  8/27/2024: Patient with flare of mildly itchy, painful rash on arms while on Pred 5mg, MTX 15mg weekly. Endorses using Clobetasol PRN for current flare with little additional improvement. 6/25/2024: Tapering down on prednisone, most recent dose 10mg, also taking MTX 15mg weekly, rash greatly improved 6/3/2024: stable lesions on arms, slightly improved on face, and progressive lesions on anterior neck. Using clobetasol, metronidazole, and opzelura. BM biopsy with hematology WNL. Started hydroxychloroquine for 2 weeks and stopped as she was not able to tolerate it (GI upset).  5/2/2024: had prior been on had been flaring worse on arms, chest, hand lesions persist, also with facial lesions at LV 5/2/24. Was very upset and depressed over her skin condition. 5/2/2024 BIOPSY w/ Diffuse and palisading sarcoidal granulomas with central nerobiosis.  - Sees ophtho (Dr. Foster Bhatti- [phone])   Derm history from 3/18 hospitalization:  Dermatology consulted on 3/18 for evaluation of progressive rash on upper body (waist up). Started as small patch on L forearm and over weeks progressed to involve R arm. At one point had rash on face as well, which improved significantly with doxycycline 100mg x 1 mo. Pt notes that when rash began to progress, she sought outpatient evaluation at [facility] and had a biopsy which showed spongiotic dermatitis; she was told she had a contact reaction and to change her skin products to fragrance free (now using All free and clear detergent, dove soap, and aquaphor and eucerin to moisturize); has not seen improvement in rash since changing these products. Lives with sister and has cat at home; sister is not itchy. Has tried several prescription topical steroids including Hydrocortisone 2.5% cream, fluocinonide ointment, and triamcinolone 01.% ointment, none of which have stopped progression of the rash. Has also been trying Triamcinolone on the face bumps.  Presented for evaluation due to continued progression which worsened approx 1 week ago.  Dyes her hair every few weeks-months, uses same dye product consistently. Underwent patch testing at an outside allergy practice, recalls being told she has sensitivity to [place] and unsure of the other 2 allergens. No new medications over past few months. Does not take supplements.  No recent travel. Denies spending much time outdoors over past few weeks/months.   Denies fever/chills, weight loss, night sweats, swollen lymph nodes, shortness of breath, chest pain, abdominal pain, n/v/d, leg swelling.   Of note, pt was seen by Dermatology during [facility] admission in late October 2023, noted to have diffuse rash in the setting of fevers, hypotension, elevated inflammatory markers. Biopsy on 10/30 was consistent with dermal hypersensitivity reaction, possibly 2/2 viral process. Extensive infectious workup at that time was negative.  Hx from Nov 2023: Hospital f/u. Rash w/ fever , GI sxs and mild leukocytosis-- infectious etiology suspected however workup negative. ESR >80. Bx of rash c/w dermal hypersensitivity.  Rash now resolved w/ dark spots. Still sometimes itchy. Fevers also resolved but has been having arrythmias, wearing heart monitor. Also scheduled to see ID soon.   IMAGING:  CT 3/2024  *  No definite CT findings of infection, malignancy, or sarcoidosis within the chest, abdomen, or pelvis, as clinically questioned. *  Nonspecific left upper lobe pulmonary nodule measuring 3 mm. Please see the Fleischner guidelines below. *  Bilateral thyroid nodules measuring up to 1.8 cm. Recommend thyroid ultrasound for further evaluation. *  The known subcentimeter cystic lesion in the pancreatic head is better appreciated on the previous MRI.  Single nodule less than 6 mm does not require routine follow-up. Certain patients at high risk with suspicious nodule morphology, upper lobe location or both may warrant 12-month follow-up. -- ?Reference: Guidelines for Management of Incidental Pulmonary Nodules Detected on CT Images: From the Fleischner Society 2017?

## 2024-09-02 NOTE — HISTORY OF PRESENT ILLNESS
[FreeTextEntry1] : RPV: granulomatous dermatitis / hospital follow up [de-identified] : MAGDA RODRIGUEZ is a 63 year old F here for evaluation of below  # Granulomatous dermatitis NOS, ddx includes sarcoid vs GA,  8/27/2024: Patient with flare of mildly itchy, painful rash on arms while on Pred 5mg, MTX 15mg weekly. Endorses using Clobetasol PRN for current flare with little additional improvement. 6/25/2024: Tapering down on prednisone, most recent dose 10mg, also taking MTX 15mg weekly, rash greatly improved 6/3/2024: stable lesions on arms, slightly improved on face, and progressive lesions on anterior neck. Using clobetasol, metronidazole, and opzelura. BM biopsy with hematology WNL. Started hydroxychloroquine for 2 weeks and stopped as she was not able to tolerate it (GI upset).  5/2/2024: had prior been on had been flaring worse on arms, chest, hand lesions persist, also with facial lesions at LV 5/2/24. Was very upset and depressed over her skin condition. 5/2/2024 BIOPSY w/ Diffuse and palisading sarcoidal granulomas with central nerobiosis.  - Sees ophtho (Dr. Foster Bhatti- [phone])   Derm history from 3/18 hospitalization:  Dermatology consulted on 3/18 for evaluation of progressive rash on upper body (waist up). Started as small patch on L forearm and over weeks progressed to involve R arm. At one point had rash on face as well, which improved significantly with doxycycline 100mg x 1 mo. Pt notes that when rash began to progress, she sought outpatient evaluation at [facility] and had a biopsy which showed spongiotic dermatitis; she was told she had a contact reaction and to change her skin products to fragrance free (now using All free and clear detergent, dove soap, and aquaphor and eucerin to moisturize); has not seen improvement in rash since changing these products. Lives with sister and has cat at home; sister is not itchy. Has tried several prescription topical steroids including Hydrocortisone 2.5% cream, fluocinonide ointment, and triamcinolone 01.% ointment, none of which have stopped progression of the rash. Has also been trying Triamcinolone on the face bumps.  Presented for evaluation due to continued progression which worsened approx 1 week ago.  Dyes her hair every few weeks-months, uses same dye product consistently. Underwent patch testing at an outside allergy practice, recalls being told she has sensitivity to [place] and unsure of the other 2 allergens. No new medications over past few months. Does not take supplements.  No recent travel. Denies spending much time outdoors over past few weeks/months.   Denies fever/chills, weight loss, night sweats, swollen lymph nodes, shortness of breath, chest pain, abdominal pain, n/v/d, leg swelling.   Of note, pt was seen by Dermatology during [facility] admission in late October 2023, noted to have diffuse rash in the setting of fevers, hypotension, elevated inflammatory markers. Biopsy on 10/30 was consistent with dermal hypersensitivity reaction, possibly 2/2 viral process. Extensive infectious workup at that time was negative.  Hx from Nov 2023: Hospital f/u. Rash w/ fever , GI sxs and mild leukocytosis-- infectious etiology suspected however workup negative. ESR >80. Bx of rash c/w dermal hypersensitivity.  Rash now resolved w/ dark spots. Still sometimes itchy. Fevers also resolved but has been having arrythmias, wearing heart monitor. Also scheduled to see ID soon.   IMAGING:  CT 3/2024  *  No definite CT findings of infection, malignancy, or sarcoidosis within the chest, abdomen, or pelvis, as clinically questioned. *  Nonspecific left upper lobe pulmonary nodule measuring 3 mm. Please see the Fleischner guidelines below. *  Bilateral thyroid nodules measuring up to 1.8 cm. Recommend thyroid ultrasound for further evaluation. *  The known subcentimeter cystic lesion in the pancreatic head is better appreciated on the previous MRI.  Single nodule less than 6 mm does not require routine follow-up. Certain patients at high risk with suspicious nodule morphology, upper lobe location or both may warrant 12-month follow-up. -- ?Reference: Guidelines for Management of Incidental Pulmonary Nodules Detected on CT Images: From the Fleischner Society 2017?

## 2024-09-02 NOTE — PHYSICAL EXAM
[FreeTextEntry3] : Focused skin exam performed  The relevant portions of the exam were performed today  AAOx3, NAD, well-appearing / pleasant Focused examination within normal limits with the exception of: - edematous pink papules and papules coalescing into lichenified plaques on bilateral dorsal hands, arms

## 2024-09-23 ENCOUNTER — NON-APPOINTMENT (OUTPATIENT)
Age: 64
End: 2024-09-23

## 2024-09-24 ENCOUNTER — APPOINTMENT (OUTPATIENT)
Dept: DERMATOLOGY | Facility: CLINIC | Age: 64
End: 2024-09-24
Payer: COMMERCIAL

## 2024-09-24 VITALS — BODY MASS INDEX: 22.45 KG/M2 | WEIGHT: 122 LBS | HEIGHT: 62 IN

## 2024-09-24 PROCEDURE — G2211 COMPLEX E/M VISIT ADD ON: CPT | Mod: NC

## 2024-09-24 PROCEDURE — 99214 OFFICE O/P EST MOD 30 MIN: CPT

## 2024-09-24 RX ORDER — TACROLIMUS 1 MG/G
0.1 OINTMENT TOPICAL
Qty: 1 | Refills: 2 | Status: ACTIVE | COMMUNITY
Start: 2024-09-24 | End: 1900-01-01

## 2024-09-24 NOTE — HISTORY OF PRESENT ILLNESS
[de-identified] : MAGDA RODRIGUEZ is a 63 year old F here for evaluation of below  # Granulomatous dermatitis NOS, ddx includes sarcoid vs GA,  9/24/2024: On prednisone 7mg daily, MTX dose increased 15-->20mg at LV 8/27/2024 and skin mostly clear, very happy, feels good, using clobetasol daily, Hgb is low (10.9) but stable 8/27/2024: Patient with flare of mildly itchy, painful rash on arms while on Pred 5mg, MTX 15mg weekly. Endorses using Clobetasol PRN for current flare with little additional improvement. 6/25/2024: Tapering down on prednisone, most recent dose 10mg, also taking MTX 15mg weekly, rash greatly improved 6/3/2024: stable lesions on arms, slightly improved on face, and progressive lesions on anterior neck. Using clobetasol, metronidazole, and opzelura. BM biopsy with hematology WNL. Started hydroxychloroquine for 2 weeks and stopped as she was not able to tolerate it (GI upset).  5/2/2024: had prior been on had been flaring worse on arms, chest, hand lesions persist, also with facial lesions at LV 5/2/24. Was very upset and depressed over her skin condition. 5/2/2024 BIOPSY w/ Diffuse and palisading sarcoidal granulomas with central nerobiosis.  - Sees [facility] (Dr. Foster Bhatti- [phone])   Derm history from 3/18 hospitalization:  Dermatology consulted on 3/18 for evaluation of progressive rash on upper body (waist up). Started as small patch on L forearm and over weeks progressed to involve R arm. At one point had rash on face as well, which improved significantly with doxycycline 100mg x 1 mo. Pt notes that when rash began to progress, she sought outpatient evaluation at [facility] and had a biopsy which showed spongiotic dermatitis; she was told she had a contact reaction and to change her skin products to fragrance free (now using All free and clear detergent, dove soap, and aquaphor and eucerin to moisturize); has not seen improvement in rash since changing these products. Lives with sister and has cat at home; sister is not itchy. Has tried several prescription topical steroids including Hydrocortisone 2.5% cream, fluocinonide ointment, and triamcinolone 01.% ointment, none of which have stopped progression of the rash. Has also been trying Triamcinolone on the face bumps.  Presented for evaluation due to continued progression which worsened approx 1 week ago.  Dyes her hair every few weeks-months, uses same dye product consistently. Underwent patch testing at an outside allergy practice, recalls being told she has sensitivity to [place] and unsure of the other 2 allergens. No new medications over past few months. Does not take supplements.  No recent travel. Denies spending much time outdoors over past few weeks/months.   Denies fever/chills, weight loss, night sweats, swollen lymph nodes, shortness of breath, chest pain, abdominal pain, n/v/d, leg swelling.   Of note, pt was seen by Dermatology during [facility] admission in late October 2023, noted to have diffuse rash in the setting of fevers, hypotension, elevated inflammatory markers. Biopsy on 10/30 was consistent with dermal hypersensitivity reaction, possibly 2/2 viral process. Extensive infectious workup at that time was negative.  Hx from Nov 2023: Hospital f/u. Rash w/ fever , GI sxs and mild leukocytosis-- infectious etiology suspected however workup negative. ESR >80. Bx of rash c/w dermal hypersensitivity.  Rash now resolved w/ dark spots. Still sometimes itchy. Fevers also resolved but has been having arrythmias, wearing heart monitor. Also scheduled to see ID soon.   IMAGING:  CT 3/2024  *  No definite CT findings of infection, malignancy, or sarcoidosis within the chest, abdomen, or pelvis, as clinically questioned. *  Nonspecific left upper lobe pulmonary nodule measuring 3 mm. Please see the Fleischner guidelines below. *  Bilateral thyroid nodules measuring up to 1.8 cm. Recommend thyroid ultrasound for further evaluation. *  The known subcentimeter cystic lesion in the pancreatic head is better appreciated on the previous MRI.  Single nodule less than 6 mm does not require routine follow-up. Certain patients at high risk with suspicious nodule morphology, upper lobe location or both may warrant 12-month follow-up. -- ?Reference: Guidelines for Management of Incidental Pulmonary Nodules Detected on CT Images: From the Fleischner Society 2017?  [FreeTextEntry1] : RPV: granulomatous dermatitis / hospital follow up

## 2024-09-24 NOTE — PHYSICAL EXAM
[FreeTextEntry3] : Focused skin exam performed  The relevant portions of the exam were performed today  AAOx3, NAD, well-appearing / pleasant Focused examination within normal limits with the exception of: - edematous pink papules and papules coalescing into lichenified plaques on bilateral dorsal hands, arms--- improving

## 2024-09-26 ENCOUNTER — APPOINTMENT (OUTPATIENT)
Dept: DERMATOLOGY | Facility: CLINIC | Age: 64
End: 2024-09-26

## 2024-09-28 ENCOUNTER — RX RENEWAL (OUTPATIENT)
Age: 64
End: 2024-09-28

## 2024-09-28 RX ORDER — METHOTREXATE 2.5 MG/1
2.5 TABLET ORAL
Qty: 128 | Refills: 0 | Status: ACTIVE | COMMUNITY
Start: 2024-09-24 | End: 1900-01-01

## 2024-10-29 ENCOUNTER — APPOINTMENT (OUTPATIENT)
Dept: DERMATOLOGY | Facility: CLINIC | Age: 64
End: 2024-10-29
Payer: COMMERCIAL

## 2024-10-29 VITALS — BODY MASS INDEX: 22.82 KG/M2 | HEIGHT: 62 IN | WEIGHT: 124 LBS

## 2024-10-29 PROCEDURE — G2211 COMPLEX E/M VISIT ADD ON: CPT | Mod: NC

## 2024-10-29 PROCEDURE — 99214 OFFICE O/P EST MOD 30 MIN: CPT

## 2024-10-29 RX ORDER — TRIAMCINOLONE ACETONIDE 1 MG/G
0.1 OINTMENT TOPICAL
Qty: 1 | Refills: 0 | Status: ACTIVE | COMMUNITY
Start: 2024-10-29 | End: 1900-01-01

## 2024-11-13 ENCOUNTER — NON-APPOINTMENT (OUTPATIENT)
Age: 64
End: 2024-11-13

## 2024-12-17 ENCOUNTER — APPOINTMENT (OUTPATIENT)
Dept: DERMATOLOGY | Facility: CLINIC | Age: 64
End: 2024-12-17
Payer: COMMERCIAL

## 2024-12-17 DIAGNOSIS — L81.0 POSTINFLAMMATORY HYPERPIGMENTATION: ICD-10-CM

## 2024-12-17 DIAGNOSIS — L30.8 OTHER SPECIFIED DERMATITIS: ICD-10-CM

## 2024-12-17 PROCEDURE — 99214 OFFICE O/P EST MOD 30 MIN: CPT

## 2024-12-17 PROCEDURE — G2211 COMPLEX E/M VISIT ADD ON: CPT | Mod: NC

## 2024-12-31 ENCOUNTER — RX RENEWAL (OUTPATIENT)
Age: 64
End: 2024-12-31

## 2025-01-28 ENCOUNTER — APPOINTMENT (OUTPATIENT)
Dept: DERMATOLOGY | Facility: CLINIC | Age: 65
End: 2025-01-28
Payer: COMMERCIAL

## 2025-01-28 ENCOUNTER — NON-APPOINTMENT (OUTPATIENT)
Age: 65
End: 2025-01-28

## 2025-01-28 VITALS — WEIGHT: 120 LBS | HEIGHT: 62 IN | BODY MASS INDEX: 22.08 KG/M2

## 2025-01-28 PROCEDURE — 99214 OFFICE O/P EST MOD 30 MIN: CPT

## 2025-01-28 PROCEDURE — G2211 COMPLEX E/M VISIT ADD ON: CPT | Mod: NC

## 2025-01-28 RX ORDER — METHOTREXATE 2.5 MG/1
2.5 TABLET ORAL
Qty: 16 | Refills: 3 | Status: ACTIVE | COMMUNITY
Start: 2025-01-28 | End: 1900-01-01

## 2025-02-04 LAB
ALBUMIN SERPL ELPH-MCNC: 4.1 G/DL
ALP BLD-CCNC: 81 U/L
ALT SERPL-CCNC: 23 U/L
ANION GAP SERPL CALC-SCNC: 15 MMOL/L
AST SERPL-CCNC: 18 U/L
BASOPHILS # BLD AUTO: 0.03 K/UL
BASOPHILS NFR BLD AUTO: 0.4 %
BILIRUB SERPL-MCNC: 0.2 MG/DL
BUN SERPL-MCNC: 16 MG/DL
CALCIUM SERPL-MCNC: 9.3 MG/DL
CHLORIDE SERPL-SCNC: 104 MMOL/L
CO2 SERPL-SCNC: 23 MMOL/L
CREAT SERPL-MCNC: 0.69 MG/DL
EGFR: 97 ML/MIN/1.73M2
EOSINOPHIL # BLD AUTO: 0.24 K/UL
EOSINOPHIL NFR BLD AUTO: 2.9 %
GLUCOSE SERPL-MCNC: 167 MG/DL
HCT VFR BLD CALC: 38.1 %
HGB BLD-MCNC: 11.8 G/DL
IMM GRANULOCYTES NFR BLD AUTO: 0.4 %
LYMPHOCYTES # BLD AUTO: 2.55 K/UL
LYMPHOCYTES NFR BLD AUTO: 30.3 %
MAN DIFF?: NORMAL
MCHC RBC-ENTMCNC: 27.3 PG
MCHC RBC-ENTMCNC: 31 G/DL
MCV RBC AUTO: 88.2 FL
MONOCYTES # BLD AUTO: 0.71 K/UL
MONOCYTES NFR BLD AUTO: 8.4 %
NEUTROPHILS # BLD AUTO: 4.86 K/UL
NEUTROPHILS NFR BLD AUTO: 57.6 %
PLATELET # BLD AUTO: 311 K/UL
POTASSIUM SERPL-SCNC: 5 MMOL/L
PROT SERPL-MCNC: 7.3 G/DL
RBC # BLD: 4.32 M/UL
RBC # FLD: 14.7 %
SODIUM SERPL-SCNC: 142 MMOL/L
WBC # FLD AUTO: 8.42 K/UL

## 2025-04-01 ENCOUNTER — APPOINTMENT (OUTPATIENT)
Dept: DERMATOLOGY | Facility: CLINIC | Age: 65
End: 2025-04-01
Payer: COMMERCIAL

## 2025-04-01 DIAGNOSIS — Z79.899 OTHER LONG TERM (CURRENT) DRUG THERAPY: ICD-10-CM

## 2025-04-01 DIAGNOSIS — L30.9 DERMATITIS, UNSPECIFIED: ICD-10-CM

## 2025-04-01 PROCEDURE — G2211 COMPLEX E/M VISIT ADD ON: CPT | Mod: NC

## 2025-04-01 PROCEDURE — 99214 OFFICE O/P EST MOD 30 MIN: CPT

## 2025-06-11 ENCOUNTER — APPOINTMENT (OUTPATIENT)
Dept: MRI IMAGING | Facility: IMAGING CENTER | Age: 65
End: 2025-06-11
Payer: COMMERCIAL

## 2025-06-11 ENCOUNTER — OUTPATIENT (OUTPATIENT)
Dept: OUTPATIENT SERVICES | Facility: HOSPITAL | Age: 65
LOS: 1 days | End: 2025-06-11
Payer: COMMERCIAL

## 2025-06-11 DIAGNOSIS — D86.3 SARCOIDOSIS OF SKIN: ICD-10-CM

## 2025-06-11 DIAGNOSIS — Z00.8 ENCOUNTER FOR OTHER GENERAL EXAMINATION: ICD-10-CM

## 2025-06-11 PROCEDURE — 75561 CARDIAC MRI FOR MORPH W/DYE: CPT | Mod: 26

## 2025-06-11 PROCEDURE — 75565 CARD MRI VELOC FLOW MAPPING: CPT | Mod: 26

## 2025-06-11 PROCEDURE — 75561 CARDIAC MRI FOR MORPH W/DYE: CPT

## 2025-06-11 PROCEDURE — 75565 CARD MRI VELOC FLOW MAPPING: CPT

## 2025-06-11 PROCEDURE — A9585: CPT

## 2025-06-19 ENCOUNTER — APPOINTMENT (OUTPATIENT)
Dept: MRI IMAGING | Facility: IMAGING CENTER | Age: 65
End: 2025-06-19

## 2025-07-01 NOTE — PATIENT PROFILE ADULT - IS PATIENT POST-MENOPAUSAL?
[Verbal] : Verbal [Demo] : Demo [Patient] : Patient [Good - alert, interested, motivated] : Good - alert, interested, motivated [Verbalizes knowledge/Understanding] : Verbalizes knowledge/understanding [Dressing changes] : dressing changes [Skin Care] : skin care [Signs and symptoms of infection] : sign and symptoms of infection [Nutrition] : nutrition [How and When to Call] : how and when to call [Pain Management] : pain management [Compression Therapy] : compression therapy [Patient responsibility to plan of care] : patient responsibility to plan of care [Stable] : stable [Home] : Home [Ambulatory] : Ambulatory [Not Applicable - Long Term Care/Home Health Agency] : Long Term Care/Home Health Agency: Not Applicable [] : No [FreeTextEntry2] : Infection prevention wound care Maintain optimal Skin Integrity to high pressure areas. Compression therapy Nutrition and Wound Healing Elevation and low sodium compliance Pressure relief/Pressure redistribution     [FreeTextEntry4] : - MD assessed and advised distal wound has all epithelial tissue and is healing well. No wound present. Proximal wound is only area measured within larger area which should be measured moving forward as all other areas are healed. MD advised to change treatment to Santyl on necrotic area and surrounding periwound (not epithelial tissue). - Patient comes to [facility] for dressing changes daily. Some supplies given to ensure continuity of care as patient will be away for 5 days. F/U daily for dressings changes, 1 week for an assessment. yes

## 2025-07-28 ENCOUNTER — APPOINTMENT (OUTPATIENT)
Dept: DERMATOLOGY | Facility: CLINIC | Age: 65
End: 2025-07-28
Payer: COMMERCIAL

## 2025-07-28 DIAGNOSIS — L30.9 DERMATITIS, UNSPECIFIED: ICD-10-CM

## 2025-07-28 PROCEDURE — G2211 COMPLEX E/M VISIT ADD ON: CPT | Mod: NC

## 2025-07-28 PROCEDURE — 99214 OFFICE O/P EST MOD 30 MIN: CPT

## 2025-07-28 RX ORDER — AMMONIUM LACTATE 12 %
12 CREAM (GRAM) TOPICAL
Qty: 1 | Refills: 2 | Status: ACTIVE | COMMUNITY
Start: 2025-07-28 | End: 1900-01-01

## 2025-08-26 ENCOUNTER — APPOINTMENT (OUTPATIENT)
Dept: DERMATOLOGY | Facility: CLINIC | Age: 65
End: 2025-08-26